# Patient Record
Sex: MALE | NOT HISPANIC OR LATINO | Employment: FULL TIME | URBAN - METROPOLITAN AREA
[De-identification: names, ages, dates, MRNs, and addresses within clinical notes are randomized per-mention and may not be internally consistent; named-entity substitution may affect disease eponyms.]

---

## 2020-08-07 ENCOUNTER — APPOINTMENT (OUTPATIENT)
Dept: RADIOLOGY | Facility: MEDICAL CENTER | Age: 25
End: 2020-08-07
Attending: EMERGENCY MEDICINE
Payer: COMMERCIAL

## 2020-08-07 ENCOUNTER — HOSPITAL ENCOUNTER (EMERGENCY)
Facility: MEDICAL CENTER | Age: 25
End: 2020-08-07
Attending: EMERGENCY MEDICINE
Payer: COMMERCIAL

## 2020-08-07 VITALS
HEART RATE: 67 BPM | WEIGHT: 148.37 LBS | RESPIRATION RATE: 18 BRPM | OXYGEN SATURATION: 98 % | HEIGHT: 66 IN | BODY MASS INDEX: 23.84 KG/M2 | SYSTOLIC BLOOD PRESSURE: 121 MMHG | DIASTOLIC BLOOD PRESSURE: 59 MMHG | TEMPERATURE: 98.2 F

## 2020-08-07 DIAGNOSIS — J02.9 PHARYNGITIS, UNSPECIFIED ETIOLOGY: ICD-10-CM

## 2020-08-07 DIAGNOSIS — Z20.822 SUSPECTED COVID-19 VIRUS INFECTION: ICD-10-CM

## 2020-08-07 LAB
COVID ORDER STATUS COVID19: NORMAL
EKG IMPRESSION: NORMAL
S PYO DNA SPEC NAA+PROBE: NOT DETECTED
SARS-COV-2 RNA RESP QL NAA+PROBE: NOTDETECTED
SPECIMEN SOURCE: NORMAL

## 2020-08-07 PROCEDURE — U0003 INFECTIOUS AGENT DETECTION BY NUCLEIC ACID (DNA OR RNA); SEVERE ACUTE RESPIRATORY SYNDROME CORONAVIRUS 2 (SARS-COV-2) (CORONAVIRUS DISEASE [COVID-19]), AMPLIFIED PROBE TECHNIQUE, MAKING USE OF HIGH THROUGHPUT TECHNOLOGIES AS DESCRIBED BY CMS-2020-01-R: HCPCS

## 2020-08-07 PROCEDURE — 93005 ELECTROCARDIOGRAM TRACING: CPT

## 2020-08-07 PROCEDURE — 93005 ELECTROCARDIOGRAM TRACING: CPT | Performed by: EMERGENCY MEDICINE

## 2020-08-07 PROCEDURE — C9803 HOPD COVID-19 SPEC COLLECT: HCPCS | Performed by: EMERGENCY MEDICINE

## 2020-08-07 PROCEDURE — 87651 STREP A DNA AMP PROBE: CPT

## 2020-08-07 PROCEDURE — 99283 EMERGENCY DEPT VISIT LOW MDM: CPT

## 2020-08-07 PROCEDURE — 71045 X-RAY EXAM CHEST 1 VIEW: CPT

## 2020-08-07 RX ORDER — PALIPERIDONE 6 MG/1
6 TABLET, EXTENDED RELEASE ORAL EVERY MORNING
COMMUNITY

## 2020-08-07 NOTE — ED NOTES
Discharge orders received from ERP. Provided education on discharge instructions and diagnosis. Pt demonstrated understanding of education. Pt verbalized understanding of need for follow up appointment.  Pt ambulatory off unit with all personal belongings.

## 2020-08-07 NOTE — DISCHARGE INSTRUCTIONS
You likely have a viral illness and may have COVID-19.     If you develop significant shortness of breath, meaning that it is difficult for you to walk even short distances without having to stop and catch your breath, or you become severely dizzy and this is persistent then please return to the emergency department.

## 2020-08-07 NOTE — ED TRIAGE NOTES
"Enrique Blackmon   24 y.o. male   Chief Complaint   Patient presents with   • Shortness of Breath     x 1 day, worse when lying on his back. pt also with chest wall pain worsening wiht inspiration    • Sore Throat        Symptoms got worse while he was working as a .   Pt amb to triage with steady gait for above complaint.  Pt is alert and oriented, speaking in full sentences, follows commands and responds appropriately to questions. NAD. Resp are even and unlabored.   Pt placed in lobby. Pt educated on triage process. Pt encouraged to alert staff for any changes.    /76   Pulse 90   Temp 36.7 °C (98.1 °F) (Temporal)   Resp 17   Ht 1.676 m (5' 6\")   Wt 67.3 kg (148 lb 5.9 oz)   SpO2 98%   BMI 23.95 kg/m²     "

## 2020-08-07 NOTE — LETTER
8/8/2020               Enrique Blackmon  [address]  [place] [address]        Dear Enrique (MR#[id number]),    This letter is to inform you that your COVID-19 test result is NEGATIVE.  This means that the virus that causes COVID-19 was not found in your sample.      A review of your test during your recent visit requires that we notify you of the following:    Your nasal swab was negative for the novel coronavirus (COVID-19).     You are encouraged to stay at home until you have had no fever for 72 hours without the use of fever reducing medications and your symptoms are improving. There is no need for further self-quarantine for 14 days for COVID-19 unless otherwise directed by the Health Department.     For any further questions regarding COVID-19, please contact the [facility] at [phone].  Thank you for your cooperation in the matter.      Sincerely,      [facility]

## 2020-08-07 NOTE — ED PROVIDER NOTES
"ER Provider Note     Scribed for Watson Arias M.D. by Omar Morocho. 8/7/2020, 2:26 AM.    Primary Care Provider: No primary care provider noted.  Means of Arrival: Walk-in   History obtained from: Patient  History limited by: None     CHIEF COMPLAINT  Chief Complaint   Patient presents with   • Shortness of Breath     x 1 day, worse when lying on his back. pt also with chest wall pain worsening wiht inspiration    • Sore Throat       HPI  Enrique Blackmon is a 24 y.o. male who presents to the Emergency Department for acute, intermittent chest pain and sore throat onset 1 day ago. Patient states that he recently came back from [place] and afterwards noticed some pain to his chest and sore throat. He notes that he feels pain to the center of his chest with breathing, but denies any difficulty breathing. There are no known alleviating or exacerbating factors. denies any associated fever or cough.     REVIEW OF SYSTEMS  See HPI for further details. All other systems are negative.     PAST MEDICAL HISTORY   has a past medical history of bipolar.    SURGICAL HISTORY  patient denies any surgical history    SOCIAL HISTORY  Social History     Tobacco Use   • Smoking status: Never Smoker   • Smokeless tobacco: Never Used   Substance Use Topics   • Alcohol use: Not Currently   • Drug use: Never      Social History     Substance and Sexual Activity   Drug Use Never       FAMILY HISTORY  History reviewed. No pertinent family history.    CURRENT MEDICATIONS  Home Medications     Reviewed by Hawa Shahid R.N. (Registered Nurse) on 08/07/20 at 0159  Med List Status: Complete   Medication Last Dose Status   paliperidone (INVEGA) 6 MG ER tablet 7/31/2020 Active                ALLERGIES  No Known Allergies    PHYSICAL EXAM  VITAL SIGNS: /76   Pulse 90   Temp 36.7 °C (98.1 °F) (Temporal)   Resp 17   Ht 1.676 m (5' 6\")   Wt 67.3 kg (148 lb 5.9 oz)   SpO2 98%   BMI 23.95 kg/m²    Constitutional: Alert in no " apparent distress.  HENT: Redness noted to the posterior oropharynx. No signs of trauma, Bilateral external ears normal, Nose normal.   Eyes: Pupils are equal and reactive, Conjunctiva normal, Non-icteric.   Neck: Normal range of motion, No tenderness, Supple, No stridor.   Lymphatic: No lymphadenopathy noted.   Cardiovascular: Regular rate and rhythm, no palpable thrill  Thorax & Lungs: No respiratory distress,  No chest tenderness.   Abdomen: Bowel sounds normal, Soft, No tenderness, No masses, No pulsatile masses. No peritoneal signs.  Skin: Warm, Dry, No erythema, No rash.   Back: No bony tenderness, No CVA tenderness.   Extremities: Intact distal pulses, No edema, No tenderness, No cyanosis.  Musculoskeletal: Good range of motion in all major joints. No tenderness to palpation or major deformities noted.   Neurologic: Alert , Normal motor function, Normal sensory function, No focal deficits noted.   Psychiatric: Affect normal, Judgment normal, Mood normal.     DIAGNOSTIC STUDIES / PROCEDURES    EKG Interpretation:  Interpreted by me    12 Lead EKG interpreted by me to show:  Normal sinus rhythm    Axis: Normal  Intervals: Normal  Normal T waves  Normal ST segments  My impression of this EKG: Does not indicate ischemia or arrhythmia at this time.     LABS  Labs Reviewed   GROUP A STREP BY PCR    Narrative:     Droplet, Contact, and Eye Protection   COVID/SARS COV-2   SARS-COV-2, PCR (IN-HOUSE)     All labs reviewed by me.    RADIOLOGY  DX-CHEST-PORTABLE (1 VIEW)   Final Result         1.  No acute cardiopulmonary disease.         The radiologist's interpretation of all radiological studies have been reviewed by me.    COURSE & MEDICAL DECISION MAKING  Pertinent Labs & Imaging studies reviewed. (See chart for details)    PPE Note: I verified that the patient was wearing a mask and I was wearing appropriate PPE every time I entered the room. The patient's mask was on the patient at all times during my encounter  except for a brief view of the oropharynx.     Scribe remained outside the patient's room and did not have any contact with the patient for the duration of patient encounter.     This is a 24 y.o. male that presents chest pain as well as mild shortness of breath and cough.  There is some viral symptoms that are concerning for COVID.  He recently traveled up to [facility].  I will get a chest x-ray to evaluate for pneumonia pneumothorax.  EKG as well as strep swab given the sore throat and then reassess..     2:26 AM - Patient seen and examined at bedside. Ordered DX-chest, Group A strep by PCR, COVID, and EKG.      Patient had a nonischemic EKG.  His strep is negative.  The COVID PCR was sent off.  I will instruct him to isolate at home.  His chest x-ray is negative.  We will discharge him home with strict return precautions and follow-up.            FINAL IMPRESSION  1. Suspected COVID-19 virus infection    2. Pharyngitis, unspecified etiology          Omar VILLA (Estrada), am scribing for, and in the presence of, Watson Arias M.D..    Electronically signed by: Omar Morocho (Estrada), 8/7/2020    IWatson M.D. personally performed the services described in this documentation, as scribed by Omar Morocho in my presence, and it is both accurate and complete. C.    The note accurately reflects work and decisions made by me.  Watson Arias M.D.  8/7/2020  6:43 AM

## 2020-08-08 NOTE — ED NOTES
COVID-19 Test Follow Up  08/08/20      Patient tested negative for COVID-19. Attempted to inform patient of result, but there was no answer. Left a message to call for results. If they return the call, I will discuss staying at home until no fever for 72 hours without the use of fever reducing medications and symptoms improving. Informed there is no need for further self-isolatation for 14 days for COVID-19 unless otherwise directed by the Health Dept.     They are advised to return to the ER for worsening symptoms including difficulty breathing, ongoing fever, weakness or chest pain.    Corazon Soto, PharmD

## 2020-11-30 ENCOUNTER — APPOINTMENT (OUTPATIENT)
Dept: RADIOLOGY | Facility: IMAGING CENTER | Age: 25
End: 2020-11-30
Attending: FAMILY MEDICINE
Payer: COMMERCIAL

## 2020-11-30 ENCOUNTER — OFFICE VISIT (OUTPATIENT)
Dept: URGENT CARE | Facility: CLINIC | Age: 25
End: 2020-11-30
Payer: COMMERCIAL

## 2020-11-30 VITALS
BODY MASS INDEX: 22.18 KG/M2 | TEMPERATURE: 97.1 F | OXYGEN SATURATION: 99 % | HEIGHT: 66 IN | HEART RATE: 50 BPM | SYSTOLIC BLOOD PRESSURE: 112 MMHG | DIASTOLIC BLOOD PRESSURE: 62 MMHG | RESPIRATION RATE: 16 BRPM | WEIGHT: 138 LBS

## 2020-11-30 DIAGNOSIS — S20.219A CONTUSION OF CHEST WALL, UNSPECIFIED LATERALITY, INITIAL ENCOUNTER: ICD-10-CM

## 2020-11-30 DIAGNOSIS — R07.9 CHEST PAIN, UNSPECIFIED TYPE: ICD-10-CM

## 2020-11-30 PROCEDURE — 99204 OFFICE O/P NEW MOD 45 MIN: CPT | Performed by: FAMILY MEDICINE

## 2020-11-30 PROCEDURE — 71111 X-RAY EXAM RIBS/CHEST4/> VWS: CPT | Mod: TC | Performed by: FAMILY MEDICINE

## 2020-11-30 NOTE — LETTER
November 30, 2020         Patient: Enrique Blackmon   YOB: 1995   Date of Visit: 11/30/2020           To Whom it May Concern:    Enrique Blackmon was seen in my clinic on 11/30/2020.       I recommend no lifting > 10 pounds for the next week.      If you have any questions or concerns, please don't hesitate to call.        Sincerely,           Dandy Vazquez M.D.  Electronically Signed

## 2020-12-01 NOTE — PROGRESS NOTES
"Subjective:      Chief Complaint   Patient presents with   • Injury     skiing injury x 1 day hurt his chest and having (R) lung pain                      Chest Pain   This is a new problem.   Pt fell while snowboarding yesterday.   Now c/o dull, constant chest pressure and soreness over upper sternum area and rt pectoral muscle.   Pain is worse with deep breathing.          Pertinent negatives include no sob, claudication, cough, exertional chest pressure, fever, nausea, near-syncope, numbness, syncope or vomiting. He has not taken any medication for this.           Past Medical History:   Diagnosis Date   • bipolar          Social History     Tobacco Use   • Smoking status: Never Smoker   • Smokeless tobacco: Never Used   Substance Use Topics   • Alcohol use: Not Currently   • Drug use: Never         Current Outpatient Medications on File Prior to Visit   Medication Sig Dispense Refill   • paliperidone (INVEGA) 6 MG ER tablet Take 6 mg by mouth every morning.       No current facility-administered medications on file prior to visit.      Family history was reviewed and not pertinent         Review of Systems   Constitutional: Negative for fever, chills and malaise/fatigue.   Eyes: Negative for vision changes, d/c.    Respiratory: Negative for cough and sputum production.    Cardiovascular: Negative for  palpitations.   Gastrointestinal: Negative for nausea, vomiting, abdominal pain, diarrhea and constipation.   Genitourinary: Negative for dysuria, urgency and frequency.   Skin: Negative for rash or  itching.   Neurological: Negative for dizziness and tingling.   Psychiatric/Behavioral: Negative for depression.   Hematologic/lymphatic - denies bruising or excessive bleeding  All other systems reviewed and are negative.         Objective:     /62 (BP Location: Left arm, Patient Position: Sitting, BP Cuff Size: Adult)   Pulse (!) 50   Temp 36.2 °C (97.1 °F) (Temporal)   Resp 16   Ht 1.676 m (5' 6\")   Wt " 62.6 kg (138 lb)   SpO2 99%       Physical Exam   Constitutional: pt is oriented to person, place, and time. Pt appears well-developed and well-nourished.   HENT:   Head: Normocephalic and atraumatic.   Mouth/Throat: Oropharynx is clear and moist.   Eyes: Conjunctivae and EOM are normal. Pupils are equal, round, and reactive to light. No scleral icterus.   Neck: Normal range of motion. Neck supple. No JVD present. No thyromegaly present.   Cardiovascular: Normal rate, regular rhythm, normal heart sounds and intact distal pulses.  Exam reveals no gallop and no friction rub.    No murmur heard.  Pulmonary/Chest: Effort normal and breath sounds normal. No respiratory distress. Pt has no wheezes. Pt has no rales. Pt exhibits  Tenderness over rt ribs,  rt pectoralis muscle and superior aspect of sternum.   No obvious bruising or swelling. .   Abdominal: Soft.   Musculoskeletal: Normal range of motion. Pt exhibits no edema.   Lymphadenopathy:     Pt has no cervical adenopathy.   Neurological: pt is alert and oriented to person, place, and time. No cranial nerve deficit.   Skin: Skin is warm and dry. No erythema.   Psychiatric: pt has a normal mood and affect. patient's behavior is normal.     Details    Reading Physician Reading Date Result Priority   Mercedes Boateng M.D.  [phone] 11/30/2020 Urgent Care      Narrative & Impression        11/30/2020 10:40 PM     HISTORY/REASON FOR EXAM:  Pain Following Trauma.     TECHNIQUE/EXAM DESCRIPTION AND NUMBER OF VIEWS:  9 images of the bilateral ribs and chest.     COMPARISON: NONE     FINDINGS:     No fractures or acute bony changes are noted.  There is no evidence of a hemo or pneumothorax.     IMPRESSION:        1.  Normal rib series.          Assessment/Plan:        1. Chest wall contusion  Chest x-ray was personally interpreted and reviewed. No acute cardiopulmonary findings. No pulmonary infiltrates or densities. Cardiac silhouette is normal. No hemidiaphragm  elevation. No bony abnormalities.       - Diclofenac Sodium (VOLTAREN) 1 % Gel; Place 4 g on the skin 3 times a day as needed.  Dispense: 100 g; Refill: 0    - [id number]-[id number] (WITH 1-VIEW CXR); Future

## 2021-03-28 ENCOUNTER — APPOINTMENT (OUTPATIENT)
Dept: RADIOLOGY | Facility: MEDICAL CENTER | Age: 26
DRG: 028 | End: 2021-03-28
Attending: SURGERY
Payer: COMMERCIAL

## 2021-03-28 ENCOUNTER — APPOINTMENT (OUTPATIENT)
Dept: RADIOLOGY | Facility: MEDICAL CENTER | Age: 26
DRG: 028 | End: 2021-03-28
Attending: EMERGENCY MEDICINE
Payer: COMMERCIAL

## 2021-03-28 ENCOUNTER — HOSPITAL ENCOUNTER (INPATIENT)
Facility: MEDICAL CENTER | Age: 26
LOS: 17 days | DRG: 028 | End: 2021-04-14
Attending: EMERGENCY MEDICINE | Admitting: SURGERY
Payer: COMMERCIAL

## 2021-03-28 DIAGNOSIS — S14.109A SPINAL CORD INJURY, C1-C7, INITIAL ENCOUNTER (HCC): ICD-10-CM

## 2021-03-28 PROBLEM — S12.9XXA: Status: ACTIVE | Noted: 2021-03-28

## 2021-03-28 PROBLEM — Z53.09 CONTRAINDICATION TO DEEP VEIN THROMBOSIS (DVT) PROPHYLAXIS: Status: ACTIVE | Noted: 2021-03-28

## 2021-03-28 PROBLEM — S12.500A C6 CERVICAL FRACTURE (HCC): Status: ACTIVE | Noted: 2021-03-28

## 2021-03-28 PROBLEM — T14.90XA TRAUMA: Status: ACTIVE | Noted: 2021-03-28

## 2021-03-28 PROBLEM — T68.XXXA HYPOTHERMIA: Status: ACTIVE | Noted: 2021-03-28

## 2021-03-28 PROBLEM — S01.01XA SCALP LACERATION, INITIAL ENCOUNTER: Status: ACTIVE | Noted: 2021-03-28

## 2021-03-28 PROBLEM — Z11.9 SCREENING EXAMINATION FOR INFECTIOUS DISEASE: Status: ACTIVE | Noted: 2021-03-28

## 2021-03-28 LAB
ABO + RH BLD: NORMAL
ABO GROUP BLD: NORMAL
ALBUMIN SERPL BCP-MCNC: 4.5 G/DL (ref 3.2–4.9)
ALBUMIN/GLOB SERPL: 1.8 G/DL
ALP SERPL-CCNC: 84 U/L (ref 30–99)
ALT SERPL-CCNC: 25 U/L (ref 2–50)
ANION GAP SERPL CALC-SCNC: 16 MMOL/L (ref 7–16)
APTT PPP: 24.8 SEC (ref 24.7–36)
AST SERPL-CCNC: 29 U/L (ref 12–45)
BILIRUB SERPL-MCNC: 0.7 MG/DL (ref 0.1–1.5)
BLD GP AB SCN SERPL QL: NORMAL
BUN SERPL-MCNC: 17 MG/DL (ref 8–22)
CALCIUM SERPL-MCNC: 9.4 MG/DL (ref 8.5–10.5)
CFT BLD TEG: 3.8 MIN (ref 5–10)
CHLORIDE SERPL-SCNC: 101 MMOL/L (ref 96–112)
CLOT ANGLE BLD TEG: 62.6 DEGREES (ref 53–72)
CLOT LYSIS 30M P MA LENFR BLD TEG: 0.2 % (ref 0–8)
CO2 SERPL-SCNC: 20 MMOL/L (ref 20–33)
CREAT SERPL-MCNC: 0.77 MG/DL (ref 0.5–1.4)
CT.EXTRINSIC BLD ROTEM: 2.2 MIN (ref 1–3)
ERYTHROCYTE [DISTWIDTH] IN BLOOD BY AUTOMATED COUNT: 42 FL (ref 35.9–50)
ETHANOL BLD-MCNC: 53.7 MG/DL (ref 0–10)
GLOBULIN SER CALC-MCNC: 2.5 G/DL (ref 1.9–3.5)
GLUCOSE BLD-MCNC: 110 MG/DL (ref 65–99)
GLUCOSE SERPL-MCNC: 94 MG/DL (ref 65–99)
HCT VFR BLD AUTO: 38.2 % (ref 42–52)
HGB BLD-MCNC: 13.3 G/DL (ref 14–18)
INR PPP: 1.06 (ref 0.87–1.13)
LACTATE BLD-SCNC: 2.3 MMOL/L (ref 0.5–2)
LACTATE BLD-SCNC: 2.7 MMOL/L (ref 0.5–2)
MCF BLD TEG: 60 MM (ref 50–70)
MCH RBC QN AUTO: 30.7 PG (ref 27–33)
MCHC RBC AUTO-ENTMCNC: 34.8 G/DL (ref 33.7–35.3)
MCV RBC AUTO: 88.2 FL (ref 81.4–97.8)
PA AA BLD-ACNC: 0 %
PA ADP BLD-ACNC: 0 %
PLATELET # BLD AUTO: 248 K/UL (ref 164–446)
PMV BLD AUTO: 9.5 FL (ref 9–12.9)
POTASSIUM SERPL-SCNC: 3.3 MMOL/L (ref 3.6–5.5)
PROT SERPL-MCNC: 7 G/DL (ref 6–8.2)
PROTHROMBIN TIME: 14.2 SEC (ref 12–14.6)
RBC # BLD AUTO: 4.33 M/UL (ref 4.7–6.1)
RH BLD: NORMAL
SARS-COV+SARS-COV-2 AG RESP QL IA.RAPID: NOTDETECTED
SODIUM SERPL-SCNC: 137 MMOL/L (ref 135–145)
SPECIMEN SOURCE: NORMAL
TEG ALGORITHM TGALG: ABNORMAL
WBC # BLD AUTO: 7.9 K/UL (ref 4.8–10.8)

## 2021-03-28 PROCEDURE — 36415 COLL VENOUS BLD VENIPUNCTURE: CPT

## 2021-03-28 PROCEDURE — 85347 COAGULATION TIME ACTIVATED: CPT

## 2021-03-28 PROCEDURE — 85576 BLOOD PLATELET AGGREGATION: CPT

## 2021-03-28 PROCEDURE — 71260 CT THORAX DX C+: CPT

## 2021-03-28 PROCEDURE — 85384 FIBRINOGEN ACTIVITY: CPT

## 2021-03-28 PROCEDURE — 85610 PROTHROMBIN TIME: CPT

## 2021-03-28 PROCEDURE — 72128 CT CHEST SPINE W/O DYE: CPT

## 2021-03-28 PROCEDURE — 83605 ASSAY OF LACTIC ACID: CPT

## 2021-03-28 PROCEDURE — 700111 HCHG RX REV CODE 636 W/ 250 OVERRIDE (IP): Performed by: SURGERY

## 2021-03-28 PROCEDURE — 82077 ASSAY SPEC XCP UR&BREATH IA: CPT

## 2021-03-28 PROCEDURE — 80053 COMPREHEN METABOLIC PANEL: CPT

## 2021-03-28 PROCEDURE — 770022 HCHG ROOM/CARE - ICU (200)

## 2021-03-28 PROCEDURE — C9803 HOPD COVID-19 SPEC COLLECT: HCPCS | Performed by: EMERGENCY MEDICINE

## 2021-03-28 PROCEDURE — 86901 BLOOD TYPING SEROLOGIC RH(D): CPT

## 2021-03-28 PROCEDURE — 83735 ASSAY OF MAGNESIUM: CPT

## 2021-03-28 PROCEDURE — 84100 ASSAY OF PHOSPHORUS: CPT

## 2021-03-28 PROCEDURE — 86850 RBC ANTIBODY SCREEN: CPT

## 2021-03-28 PROCEDURE — U0003 INFECTIOUS AGENT DETECTION BY NUCLEIC ACID (DNA OR RNA); SEVERE ACUTE RESPIRATORY SYNDROME CORONAVIRUS 2 (SARS-COV-2) (CORONAVIRUS DISEASE [COVID-19]), AMPLIFIED PROBE TECHNIQUE, MAKING USE OF HIGH THROUGHPUT TECHNOLOGIES AS DESCRIBED BY CMS-2020-01-R: HCPCS

## 2021-03-28 PROCEDURE — 700101 HCHG RX REV CODE 250: Performed by: SURGERY

## 2021-03-28 PROCEDURE — 70450 CT HEAD/BRAIN W/O DYE: CPT

## 2021-03-28 PROCEDURE — 85730 THROMBOPLASTIN TIME PARTIAL: CPT

## 2021-03-28 PROCEDURE — 82962 GLUCOSE BLOOD TEST: CPT

## 2021-03-28 PROCEDURE — U0005 INFEC AGEN DETEC AMPLI PROBE: HCPCS

## 2021-03-28 PROCEDURE — 99291 CRITICAL CARE FIRST HOUR: CPT

## 2021-03-28 PROCEDURE — 70498 CT ANGIOGRAPHY NECK: CPT

## 2021-03-28 PROCEDURE — 86900 BLOOD TYPING SEROLOGIC ABO: CPT

## 2021-03-28 PROCEDURE — 72131 CT LUMBAR SPINE W/O DYE: CPT

## 2021-03-28 PROCEDURE — 700105 HCHG RX REV CODE 258: Performed by: SURGERY

## 2021-03-28 PROCEDURE — 85027 COMPLETE CBC AUTOMATED: CPT

## 2021-03-28 PROCEDURE — 87426 SARSCOV CORONAVIRUS AG IA: CPT

## 2021-03-28 PROCEDURE — 700117 HCHG RX CONTRAST REV CODE 255: Performed by: EMERGENCY MEDICINE

## 2021-03-28 PROCEDURE — G0390 TRAUMA RESPONS W/HOSP CRITI: HCPCS

## 2021-03-28 PROCEDURE — 71045 X-RAY EXAM CHEST 1 VIEW: CPT

## 2021-03-28 RX ORDER — FAMOTIDINE 20 MG/1
20 TABLET, FILM COATED ORAL 2 TIMES DAILY
Status: DISCONTINUED | OUTPATIENT
Start: 2021-03-28 | End: 2021-04-01

## 2021-03-28 RX ORDER — AMOXICILLIN 250 MG
1 CAPSULE ORAL
Status: DISCONTINUED | OUTPATIENT
Start: 2021-03-28 | End: 2021-03-29

## 2021-03-28 RX ORDER — ONDANSETRON 2 MG/ML
4 INJECTION INTRAMUSCULAR; INTRAVENOUS EVERY 4 HOURS PRN
Status: DISCONTINUED | OUTPATIENT
Start: 2021-03-28 | End: 2021-03-29

## 2021-03-28 RX ORDER — DOCUSATE SODIUM 100 MG/1
100 CAPSULE, LIQUID FILLED ORAL 2 TIMES DAILY
Status: DISCONTINUED | OUTPATIENT
Start: 2021-03-28 | End: 2021-03-29

## 2021-03-28 RX ORDER — HYDROMORPHONE HYDROCHLORIDE 1 MG/ML
0.5 INJECTION, SOLUTION INTRAMUSCULAR; INTRAVENOUS; SUBCUTANEOUS
Status: DISCONTINUED | OUTPATIENT
Start: 2021-03-28 | End: 2021-03-29

## 2021-03-28 RX ORDER — OXYCODONE HYDROCHLORIDE 5 MG/1
10 TABLET ORAL
Status: DISCONTINUED | OUTPATIENT
Start: 2021-03-28 | End: 2021-03-28

## 2021-03-28 RX ORDER — NOREPINEPHRINE BITARTRATE 0.03 MG/ML
0-30 INJECTION, SOLUTION INTRAVENOUS CONTINUOUS
Status: DISCONTINUED | OUTPATIENT
Start: 2021-03-28 | End: 2021-04-05

## 2021-03-28 RX ORDER — SODIUM CHLORIDE 9 MG/ML
INJECTION, SOLUTION INTRAVENOUS CONTINUOUS
Status: DISCONTINUED | OUTPATIENT
Start: 2021-03-28 | End: 2021-03-29

## 2021-03-28 RX ORDER — DEXTROSE MONOHYDRATE 25 G/50ML
50 INJECTION, SOLUTION INTRAVENOUS
Status: DISCONTINUED | OUTPATIENT
Start: 2021-03-28 | End: 2021-03-30

## 2021-03-28 RX ORDER — POLYETHYLENE GLYCOL 3350 17 G/17G
1 POWDER, FOR SOLUTION ORAL 2 TIMES DAILY
Status: DISCONTINUED | OUTPATIENT
Start: 2021-03-28 | End: 2021-04-14 | Stop reason: HOSPADM

## 2021-03-28 RX ORDER — ENEMA 19; 7 G/133ML; G/133ML
1 ENEMA RECTAL
Status: DISCONTINUED | OUTPATIENT
Start: 2021-03-28 | End: 2021-03-29

## 2021-03-28 RX ORDER — AMOXICILLIN 250 MG
1 CAPSULE ORAL NIGHTLY
Status: DISCONTINUED | OUTPATIENT
Start: 2021-03-28 | End: 2021-03-29

## 2021-03-28 RX ORDER — BISACODYL 10 MG
10 SUPPOSITORY, RECTAL RECTAL
Status: DISCONTINUED | OUTPATIENT
Start: 2021-03-28 | End: 2021-03-29

## 2021-03-28 RX ORDER — OXYCODONE HYDROCHLORIDE 5 MG/1
5 TABLET ORAL
Status: DISCONTINUED | OUTPATIENT
Start: 2021-03-28 | End: 2021-03-28

## 2021-03-28 RX ADMIN — FAMOTIDINE 20 MG: 10 INJECTION INTRAVENOUS at 22:54

## 2021-03-28 RX ADMIN — SODIUM CHLORIDE: 9 INJECTION, SOLUTION INTRAVENOUS at 22:47

## 2021-03-28 RX ADMIN — NOREPINEPHRINE BITARTRATE 5 MCG/MIN: 1 INJECTION INTRAVENOUS at 22:47

## 2021-03-28 RX ADMIN — IOHEXOL 100 ML: 350 INJECTION, SOLUTION INTRAVENOUS at 22:46

## 2021-03-28 ASSESSMENT — FIBROSIS 4 INDEX: FIB4 SCORE: 0.54

## 2021-03-29 ENCOUNTER — APPOINTMENT (OUTPATIENT)
Dept: RADIOLOGY | Facility: MEDICAL CENTER | Age: 26
DRG: 028 | End: 2021-03-29
Attending: SURGERY
Payer: COMMERCIAL

## 2021-03-29 ENCOUNTER — ANESTHESIA EVENT (OUTPATIENT)
Dept: SURGERY | Facility: MEDICAL CENTER | Age: 26
DRG: 028 | End: 2021-03-29
Payer: COMMERCIAL

## 2021-03-29 ENCOUNTER — APPOINTMENT (OUTPATIENT)
Dept: RADIOLOGY | Facility: MEDICAL CENTER | Age: 26
DRG: 028 | End: 2021-03-29
Attending: NURSE PRACTITIONER
Payer: COMMERCIAL

## 2021-03-29 ENCOUNTER — APPOINTMENT (OUTPATIENT)
Dept: RADIOLOGY | Facility: MEDICAL CENTER | Age: 26
DRG: 028 | End: 2021-03-29
Attending: NEUROLOGICAL SURGERY
Payer: COMMERCIAL

## 2021-03-29 ENCOUNTER — ANESTHESIA (OUTPATIENT)
Dept: SURGERY | Facility: MEDICAL CENTER | Age: 26
DRG: 028 | End: 2021-03-29
Payer: COMMERCIAL

## 2021-03-29 PROBLEM — F31.9 BIPOLAR AFFECTIVE DISORDER (HCC): Status: ACTIVE | Noted: 2021-03-29

## 2021-03-29 LAB
ALBUMIN SERPL BCP-MCNC: 4.2 G/DL (ref 3.2–4.9)
ALBUMIN/GLOB SERPL: 1.6 G/DL
ALP SERPL-CCNC: 86 U/L (ref 30–99)
ALT SERPL-CCNC: 22 U/L (ref 2–50)
ANION GAP SERPL CALC-SCNC: 13 MMOL/L (ref 7–16)
AST SERPL-CCNC: 30 U/L (ref 12–45)
BASOPHILS # BLD AUTO: 0.3 % (ref 0–1.8)
BASOPHILS # BLD: 0.06 K/UL (ref 0–0.12)
BILIRUB SERPL-MCNC: 1.3 MG/DL (ref 0.1–1.5)
BUN SERPL-MCNC: 15 MG/DL (ref 8–22)
CALCIUM SERPL-MCNC: 9 MG/DL (ref 8.5–10.5)
CHLORIDE SERPL-SCNC: 104 MMOL/L (ref 96–112)
CO2 SERPL-SCNC: 20 MMOL/L (ref 20–33)
CREAT SERPL-MCNC: 0.66 MG/DL (ref 0.5–1.4)
EOSINOPHIL # BLD AUTO: 0 K/UL (ref 0–0.51)
EOSINOPHIL NFR BLD: 0 % (ref 0–6.9)
ERYTHROCYTE [DISTWIDTH] IN BLOOD BY AUTOMATED COUNT: 41.4 FL (ref 35.9–50)
GLOBULIN SER CALC-MCNC: 2.6 G/DL (ref 1.9–3.5)
GLUCOSE BLD-MCNC: 109 MG/DL (ref 65–99)
GLUCOSE BLD-MCNC: 119 MG/DL (ref 65–99)
GLUCOSE BLD-MCNC: 99 MG/DL (ref 65–99)
GLUCOSE SERPL-MCNC: 107 MG/DL (ref 65–99)
HCT VFR BLD AUTO: 39.8 % (ref 42–52)
HGB BLD-MCNC: 13.4 G/DL (ref 14–18)
HGB BLD-MCNC: 13.8 G/DL (ref 14–18)
IMM GRANULOCYTES # BLD AUTO: 0.14 K/UL (ref 0–0.11)
IMM GRANULOCYTES NFR BLD AUTO: 0.6 % (ref 0–0.9)
LYMPHOCYTES # BLD AUTO: 1.33 K/UL (ref 1–4.8)
LYMPHOCYTES NFR BLD: 5.6 % (ref 22–41)
MAGNESIUM SERPL-MCNC: 1.7 MG/DL (ref 1.5–2.5)
MAGNESIUM SERPL-MCNC: 1.7 MG/DL (ref 1.5–2.5)
MCH RBC QN AUTO: 30.3 PG (ref 27–33)
MCHC RBC AUTO-ENTMCNC: 34.7 G/DL (ref 33.7–35.3)
MCV RBC AUTO: 87.5 FL (ref 81.4–97.8)
MONOCYTES # BLD AUTO: 1.52 K/UL (ref 0–0.85)
MONOCYTES NFR BLD AUTO: 6.3 % (ref 0–13.4)
NEUTROPHILS # BLD AUTO: 20.91 K/UL (ref 1.82–7.42)
NEUTROPHILS NFR BLD: 87.2 % (ref 44–72)
NRBC # BLD AUTO: 0 K/UL
NRBC BLD-RTO: 0 /100 WBC
PHOSPHATE SERPL-MCNC: 3.2 MG/DL (ref 2.5–4.5)
PHOSPHATE SERPL-MCNC: 3.7 MG/DL (ref 2.5–4.5)
PLATELET # BLD AUTO: 323 K/UL (ref 164–446)
PMV BLD AUTO: 9.9 FL (ref 9–12.9)
POTASSIUM SERPL-SCNC: 3.9 MMOL/L (ref 3.6–5.5)
PROT SERPL-MCNC: 6.8 G/DL (ref 6–8.2)
RBC # BLD AUTO: 4.55 M/UL (ref 4.7–6.1)
SARS-COV-2 RNA RESP QL NAA+PROBE: NOTDETECTED
SODIUM SERPL-SCNC: 137 MMOL/L (ref 135–145)
SPECIMEN SOURCE: NORMAL
WBC # BLD AUTO: 24 K/UL (ref 4.8–10.8)

## 2021-03-29 PROCEDURE — A9270 NON-COVERED ITEM OR SERVICE: HCPCS | Performed by: PHYSICIAN ASSISTANT

## 2021-03-29 PROCEDURE — 501838 HCHG SUTURE GENERAL: Performed by: NEUROLOGICAL SURGERY

## 2021-03-29 PROCEDURE — 110454 HCHG SHELL REV 250: Performed by: NEUROLOGICAL SURGERY

## 2021-03-29 PROCEDURE — C1713 ANCHOR/SCREW BN/BN,TIS/BN: HCPCS | Performed by: NEUROLOGICAL SURGERY

## 2021-03-29 PROCEDURE — 99291 CRITICAL CARE FIRST HOUR: CPT | Performed by: SURGERY

## 2021-03-29 PROCEDURE — 93970 EXTREMITY STUDY: CPT | Mod: 26 | Performed by: INTERNAL MEDICINE

## 2021-03-29 PROCEDURE — 700102 HCHG RX REV CODE 250 W/ 637 OVERRIDE(OP): Performed by: SURGERY

## 2021-03-29 PROCEDURE — 501411 HCHG SPONGE, BABY LAP W/O RINGS: Performed by: NEUROLOGICAL SURGERY

## 2021-03-29 PROCEDURE — 700101 HCHG RX REV CODE 250: Performed by: SURGERY

## 2021-03-29 PROCEDURE — 80053 COMPREHEN METABOLIC PANEL: CPT

## 2021-03-29 PROCEDURE — 160041 HCHG SURGERY MINUTES - EA ADDL 1 MIN LEVEL 4: Performed by: NEUROLOGICAL SURGERY

## 2021-03-29 PROCEDURE — 160035 HCHG PACU - 1ST 60 MINS PHASE I: Performed by: NEUROLOGICAL SURGERY

## 2021-03-29 PROCEDURE — 03HY32Z INSERTION OF MONITORING DEVICE INTO UPPER ARTERY, PERCUTANEOUS APPROACH: ICD-10-PCS | Performed by: ANESTHESIOLOGY

## 2021-03-29 PROCEDURE — 160029 HCHG SURGERY MINUTES - 1ST 30 MINS LEVEL 4: Performed by: NEUROLOGICAL SURGERY

## 2021-03-29 PROCEDURE — 500331 HCHG COTTONOID, SURG PATTIE: Performed by: NEUROLOGICAL SURGERY

## 2021-03-29 PROCEDURE — A9270 NON-COVERED ITEM OR SERVICE: HCPCS | Performed by: NEUROLOGICAL SURGERY

## 2021-03-29 PROCEDURE — 83735 ASSAY OF MAGNESIUM: CPT

## 2021-03-29 PROCEDURE — 94669 MECHANICAL CHEST WALL OSCILL: CPT

## 2021-03-29 PROCEDURE — 160002 HCHG RECOVERY MINUTES (STAT): Performed by: NEUROLOGICAL SURGERY

## 2021-03-29 PROCEDURE — 700111 HCHG RX REV CODE 636 W/ 250 OVERRIDE (IP): Performed by: SURGERY

## 2021-03-29 PROCEDURE — 160009 HCHG ANES TIME/MIN: Performed by: NEUROLOGICAL SURGERY

## 2021-03-29 PROCEDURE — 502000 HCHG MISC OR IMPLANTS RC 0278: Performed by: NEUROLOGICAL SURGERY

## 2021-03-29 PROCEDURE — C1751 CATH, INF, PER/CENT/MIDLINE: HCPCS

## 2021-03-29 PROCEDURE — 85025 COMPLETE CBC W/AUTO DIFF WBC: CPT

## 2021-03-29 PROCEDURE — 700102 HCHG RX REV CODE 250 W/ 637 OVERRIDE(OP): Performed by: PHYSICIAN ASSISTANT

## 2021-03-29 PROCEDURE — 84100 ASSAY OF PHOSPHORUS: CPT

## 2021-03-29 PROCEDURE — 770022 HCHG ROOM/CARE - ICU (200)

## 2021-03-29 PROCEDURE — A9270 NON-COVERED ITEM OR SERVICE: HCPCS | Performed by: SURGERY

## 2021-03-29 PROCEDURE — 02HV33Z INSERTION OF INFUSION DEVICE INTO SUPERIOR VENA CAVA, PERCUTANEOUS APPROACH: ICD-10-PCS | Performed by: SURGERY

## 2021-03-29 PROCEDURE — 700111 HCHG RX REV CODE 636 W/ 250 OVERRIDE (IP): Performed by: NEUROLOGICAL SURGERY

## 2021-03-29 PROCEDURE — 700101 HCHG RX REV CODE 250: Performed by: NEUROLOGICAL SURGERY

## 2021-03-29 PROCEDURE — 700102 HCHG RX REV CODE 250 W/ 637 OVERRIDE(OP): Performed by: NEUROLOGICAL SURGERY

## 2021-03-29 PROCEDURE — 00CU0ZZ EXTIRPATION OF MATTER FROM SPINAL CANAL, OPEN APPROACH: ICD-10-PCS | Performed by: NEUROLOGICAL SURGERY

## 2021-03-29 PROCEDURE — 700101 HCHG RX REV CODE 250: Performed by: ANESTHESIOLOGY

## 2021-03-29 PROCEDURE — 93970 EXTREMITY STUDY: CPT

## 2021-03-29 PROCEDURE — 36556 INSERT NON-TUNNEL CV CATH: CPT

## 2021-03-29 PROCEDURE — 700105 HCHG RX REV CODE 258: Performed by: ANESTHESIOLOGY

## 2021-03-29 PROCEDURE — 700111 HCHG RX REV CODE 636 W/ 250 OVERRIDE (IP): Performed by: ANESTHESIOLOGY

## 2021-03-29 PROCEDURE — 160048 HCHG OR STATISTICAL LEVEL 1-5: Performed by: NEUROLOGICAL SURGERY

## 2021-03-29 PROCEDURE — 72141 MRI NECK SPINE W/O DYE: CPT

## 2021-03-29 PROCEDURE — 82962 GLUCOSE BLOOD TEST: CPT

## 2021-03-29 PROCEDURE — 85018 HEMOGLOBIN: CPT

## 2021-03-29 PROCEDURE — 72040 X-RAY EXAM NECK SPINE 2-3 VW: CPT

## 2021-03-29 PROCEDURE — 700111 HCHG RX REV CODE 636 W/ 250 OVERRIDE (IP): Performed by: PHYSICIAN ASSISTANT

## 2021-03-29 PROCEDURE — 700105 HCHG RX REV CODE 258: Performed by: SURGERY

## 2021-03-29 PROCEDURE — 160036 HCHG PACU - EA ADDL 30 MINS PHASE I: Performed by: NEUROLOGICAL SURGERY

## 2021-03-29 PROCEDURE — 0RG2071 FUSION OF 2 OR MORE CERVICAL VERTEBRAL JOINTS WITH AUTOLOGOUS TISSUE SUBSTITUTE, POSTERIOR APPROACH, POSTERIOR COLUMN, OPEN APPROACH: ICD-10-PCS | Performed by: NEUROLOGICAL SURGERY

## 2021-03-29 DEVICE — IMPLANTABLE DEVICE: Type: IMPLANTABLE DEVICE | Site: SPINE CERVICAL | Status: FUNCTIONAL

## 2021-03-29 DEVICE — SCREW SET SPINE STREAMLINE: Type: IMPLANTABLE DEVICE | Site: SPINE CERVICAL | Status: FUNCTIONAL

## 2021-03-29 RX ORDER — ONDANSETRON 4 MG/1
4 TABLET, ORALLY DISINTEGRATING ORAL EVERY 4 HOURS PRN
Status: DISCONTINUED | OUTPATIENT
Start: 2021-03-29 | End: 2021-04-14 | Stop reason: HOSPADM

## 2021-03-29 RX ORDER — OXYCODONE HYDROCHLORIDE 5 MG/1
5 TABLET ORAL
Status: DISCONTINUED | OUTPATIENT
Start: 2021-03-29 | End: 2021-03-29

## 2021-03-29 RX ORDER — HYDROMORPHONE HYDROCHLORIDE 1 MG/ML
0.2 INJECTION, SOLUTION INTRAMUSCULAR; INTRAVENOUS; SUBCUTANEOUS
Status: DISCONTINUED | OUTPATIENT
Start: 2021-03-29 | End: 2021-03-29 | Stop reason: HOSPADM

## 2021-03-29 RX ORDER — DIPHENHYDRAMINE HYDROCHLORIDE 50 MG/ML
25 INJECTION INTRAMUSCULAR; INTRAVENOUS EVERY 6 HOURS PRN
Status: DISCONTINUED | OUTPATIENT
Start: 2021-03-29 | End: 2021-03-29

## 2021-03-29 RX ORDER — ONDANSETRON 2 MG/ML
4 INJECTION INTRAMUSCULAR; INTRAVENOUS
Status: DISCONTINUED | OUTPATIENT
Start: 2021-03-29 | End: 2021-03-29 | Stop reason: HOSPADM

## 2021-03-29 RX ORDER — HYDROMORPHONE HYDROCHLORIDE 1 MG/ML
0.5 INJECTION, SOLUTION INTRAMUSCULAR; INTRAVENOUS; SUBCUTANEOUS
Status: DISCONTINUED | OUTPATIENT
Start: 2021-03-29 | End: 2021-03-29

## 2021-03-29 RX ORDER — DIPHENHYDRAMINE HYDROCHLORIDE 50 MG/ML
12.5 INJECTION INTRAMUSCULAR; INTRAVENOUS
Status: DISCONTINUED | OUTPATIENT
Start: 2021-03-29 | End: 2021-03-29 | Stop reason: HOSPADM

## 2021-03-29 RX ORDER — POLYETHYLENE GLYCOL 3350 17 G/17G
1 POWDER, FOR SOLUTION ORAL 2 TIMES DAILY PRN
Status: DISCONTINUED | OUTPATIENT
Start: 2021-03-29 | End: 2021-04-14 | Stop reason: HOSPADM

## 2021-03-29 RX ORDER — DOCUSATE SODIUM 100 MG/1
100 CAPSULE, LIQUID FILLED ORAL 2 TIMES DAILY
Status: DISCONTINUED | OUTPATIENT
Start: 2021-03-29 | End: 2021-04-14 | Stop reason: HOSPADM

## 2021-03-29 RX ORDER — LORAZEPAM 1 MG/1
0.5 TABLET ORAL EVERY 6 HOURS PRN
Status: DISCONTINUED | OUTPATIENT
Start: 2021-03-29 | End: 2021-03-29

## 2021-03-29 RX ORDER — HYDROMORPHONE HYDROCHLORIDE 1 MG/ML
0.1 INJECTION, SOLUTION INTRAMUSCULAR; INTRAVENOUS; SUBCUTANEOUS
Status: DISCONTINUED | OUTPATIENT
Start: 2021-03-29 | End: 2021-03-29 | Stop reason: HOSPADM

## 2021-03-29 RX ORDER — SODIUM CHLORIDE 9 MG/ML
1000 INJECTION, SOLUTION INTRAVENOUS ONCE
Status: COMPLETED | OUTPATIENT
Start: 2021-03-29 | End: 2021-03-29

## 2021-03-29 RX ORDER — SODIUM CHLORIDE 9 MG/ML
INJECTION, SOLUTION INTRAVENOUS CONTINUOUS
Status: DISCONTINUED | OUTPATIENT
Start: 2021-03-29 | End: 2021-04-05

## 2021-03-29 RX ORDER — BISACODYL 10 MG
10 SUPPOSITORY, RECTAL RECTAL
Status: DISCONTINUED | OUTPATIENT
Start: 2021-03-29 | End: 2021-04-03

## 2021-03-29 RX ORDER — LORAZEPAM 2 MG/ML
0.5 INJECTION INTRAMUSCULAR EVERY 6 HOURS PRN
Status: DISCONTINUED | OUTPATIENT
Start: 2021-03-29 | End: 2021-03-29

## 2021-03-29 RX ORDER — AMOXICILLIN 250 MG
1 CAPSULE ORAL NIGHTLY
Status: DISCONTINUED | OUTPATIENT
Start: 2021-03-29 | End: 2021-04-14 | Stop reason: HOSPADM

## 2021-03-29 RX ORDER — HYDROMORPHONE HYDROCHLORIDE 2 MG/ML
INJECTION, SOLUTION INTRAMUSCULAR; INTRAVENOUS; SUBCUTANEOUS PRN
Status: DISCONTINUED | OUTPATIENT
Start: 2021-03-29 | End: 2021-03-29

## 2021-03-29 RX ORDER — OXYCODONE HYDROCHLORIDE 10 MG/1
10 TABLET ORAL
Status: DISCONTINUED | OUTPATIENT
Start: 2021-03-29 | End: 2021-04-14 | Stop reason: HOSPADM

## 2021-03-29 RX ORDER — VANCOMYCIN HYDROCHLORIDE 1 G/20ML
INJECTION, POWDER, LYOPHILIZED, FOR SOLUTION INTRAVENOUS
Status: COMPLETED | OUTPATIENT
Start: 2021-03-29 | End: 2021-03-29

## 2021-03-29 RX ORDER — LABETALOL HYDROCHLORIDE 5 MG/ML
10 INJECTION, SOLUTION INTRAVENOUS
Status: DISCONTINUED | OUTPATIENT
Start: 2021-03-29 | End: 2021-04-05

## 2021-03-29 RX ORDER — MIDAZOLAM HYDROCHLORIDE 1 MG/ML
1 INJECTION INTRAMUSCULAR; INTRAVENOUS
Status: DISCONTINUED | OUTPATIENT
Start: 2021-03-29 | End: 2021-03-29 | Stop reason: HOSPADM

## 2021-03-29 RX ORDER — DEXAMETHASONE SODIUM PHOSPHATE 4 MG/ML
INJECTION, SOLUTION INTRA-ARTICULAR; INTRALESIONAL; INTRAMUSCULAR; INTRAVENOUS; SOFT TISSUE PRN
Status: DISCONTINUED | OUTPATIENT
Start: 2021-03-29 | End: 2021-03-30 | Stop reason: SURG

## 2021-03-29 RX ORDER — MEPERIDINE HYDROCHLORIDE 25 MG/ML
12.5 INJECTION INTRAMUSCULAR; INTRAVENOUS; SUBCUTANEOUS
Status: DISCONTINUED | OUTPATIENT
Start: 2021-03-29 | End: 2021-03-29 | Stop reason: HOSPADM

## 2021-03-29 RX ORDER — HYDROMORPHONE HYDROCHLORIDE 1 MG/ML
.5-1 INJECTION, SOLUTION INTRAMUSCULAR; INTRAVENOUS; SUBCUTANEOUS
Status: DISCONTINUED | OUTPATIENT
Start: 2021-03-29 | End: 2021-04-05

## 2021-03-29 RX ORDER — CEFAZOLIN SODIUM 1 G/3ML
INJECTION, POWDER, FOR SOLUTION INTRAMUSCULAR; INTRAVENOUS PRN
Status: DISCONTINUED | OUTPATIENT
Start: 2021-03-29 | End: 2021-03-30 | Stop reason: SURG

## 2021-03-29 RX ORDER — BACITRACIN ZINC 500 [USP'U]/G
OINTMENT TOPICAL
Status: DISCONTINUED | OUTPATIENT
Start: 2021-03-29 | End: 2021-03-29 | Stop reason: HOSPADM

## 2021-03-29 RX ORDER — SODIUM CHLORIDE, SODIUM LACTATE, POTASSIUM CHLORIDE, CALCIUM CHLORIDE 600; 310; 30; 20 MG/100ML; MG/100ML; MG/100ML; MG/100ML
INJECTION, SOLUTION INTRAVENOUS CONTINUOUS
Status: DISCONTINUED | OUTPATIENT
Start: 2021-03-29 | End: 2021-03-29 | Stop reason: HOSPADM

## 2021-03-29 RX ORDER — HYDRALAZINE HYDROCHLORIDE 20 MG/ML
10 INJECTION INTRAMUSCULAR; INTRAVENOUS
Status: DISCONTINUED | OUTPATIENT
Start: 2021-03-29 | End: 2021-04-05

## 2021-03-29 RX ORDER — HALOPERIDOL 5 MG/ML
1 INJECTION INTRAMUSCULAR
Status: DISCONTINUED | OUTPATIENT
Start: 2021-03-29 | End: 2021-03-29 | Stop reason: HOSPADM

## 2021-03-29 RX ORDER — OXYCODONE HYDROCHLORIDE 5 MG/1
5 TABLET ORAL
Status: DISCONTINUED | OUTPATIENT
Start: 2021-03-29 | End: 2021-04-14 | Stop reason: HOSPADM

## 2021-03-29 RX ORDER — ACETAMINOPHEN 500 MG
1000 TABLET ORAL EVERY 6 HOURS
Status: COMPLETED | OUTPATIENT
Start: 2021-03-29 | End: 2021-04-03

## 2021-03-29 RX ORDER — CALCIUM CARBONATE 500 MG/1
500 TABLET, CHEWABLE ORAL 2 TIMES DAILY
Status: DISCONTINUED | OUTPATIENT
Start: 2021-03-29 | End: 2021-04-14 | Stop reason: HOSPADM

## 2021-03-29 RX ORDER — MIDAZOLAM HYDROCHLORIDE 1 MG/ML
2 INJECTION INTRAMUSCULAR; INTRAVENOUS ONCE
Status: COMPLETED | OUTPATIENT
Start: 2021-03-29 | End: 2021-03-29

## 2021-03-29 RX ORDER — LIDOCAINE HYDROCHLORIDE 20 MG/ML
INJECTION, SOLUTION EPIDURAL; INFILTRATION; INTRACAUDAL; PERINEURAL PRN
Status: DISCONTINUED | OUTPATIENT
Start: 2021-03-29 | End: 2021-03-30 | Stop reason: SURG

## 2021-03-29 RX ORDER — CEFAZOLIN SODIUM 1 G/3ML
INJECTION, POWDER, FOR SOLUTION INTRAMUSCULAR; INTRAVENOUS
Status: DISCONTINUED | OUTPATIENT
Start: 2021-03-29 | End: 2021-03-29 | Stop reason: HOSPADM

## 2021-03-29 RX ORDER — MAGNESIUM SULFATE HEPTAHYDRATE 500 MG/ML
INJECTION, SOLUTION INTRAMUSCULAR; INTRAVENOUS PRN
Status: DISCONTINUED | OUTPATIENT
Start: 2021-03-29 | End: 2021-03-30 | Stop reason: SURG

## 2021-03-29 RX ORDER — OXYCODONE HCL 5 MG/5 ML
5 SOLUTION, ORAL ORAL
Status: DISCONTINUED | OUTPATIENT
Start: 2021-03-29 | End: 2021-03-29 | Stop reason: HOSPADM

## 2021-03-29 RX ORDER — LABETALOL HYDROCHLORIDE 5 MG/ML
5 INJECTION, SOLUTION INTRAVENOUS
Status: DISCONTINUED | OUTPATIENT
Start: 2021-03-29 | End: 2021-03-29 | Stop reason: HOSPADM

## 2021-03-29 RX ORDER — METHOCARBAMOL 750 MG/1
750 TABLET, FILM COATED ORAL EVERY 8 HOURS PRN
Status: DISCONTINUED | OUTPATIENT
Start: 2021-03-29 | End: 2021-04-14 | Stop reason: HOSPADM

## 2021-03-29 RX ORDER — OXYCODONE HYDROCHLORIDE 10 MG/1
10 TABLET ORAL
Status: DISCONTINUED | OUTPATIENT
Start: 2021-03-29 | End: 2021-03-29

## 2021-03-29 RX ORDER — HYDROMORPHONE HYDROCHLORIDE 1 MG/ML
0.4 INJECTION, SOLUTION INTRAMUSCULAR; INTRAVENOUS; SUBCUTANEOUS
Status: DISCONTINUED | OUTPATIENT
Start: 2021-03-29 | End: 2021-03-29 | Stop reason: HOSPADM

## 2021-03-29 RX ORDER — SODIUM CHLORIDE, SODIUM GLUCONATE, SODIUM ACETATE, POTASSIUM CHLORIDE AND MAGNESIUM CHLORIDE 526; 502; 368; 37; 30 MG/100ML; MG/100ML; MG/100ML; MG/100ML; MG/100ML
500 INJECTION, SOLUTION INTRAVENOUS CONTINUOUS
Status: DISCONTINUED | OUTPATIENT
Start: 2021-03-29 | End: 2021-03-29 | Stop reason: HOSPADM

## 2021-03-29 RX ORDER — ACETAMINOPHEN 325 MG/1
650 TABLET ORAL EVERY 6 HOURS
Status: DISCONTINUED | OUTPATIENT
Start: 2021-03-29 | End: 2021-03-29

## 2021-03-29 RX ORDER — CEFAZOLIN SODIUM 2 G/100ML
2 INJECTION, SOLUTION INTRAVENOUS EVERY 8 HOURS
Status: COMPLETED | OUTPATIENT
Start: 2021-03-29 | End: 2021-03-30

## 2021-03-29 RX ORDER — SODIUM CHLORIDE, SODIUM LACTATE, POTASSIUM CHLORIDE, CALCIUM CHLORIDE 600; 310; 30; 20 MG/100ML; MG/100ML; MG/100ML; MG/100ML
INJECTION, SOLUTION INTRAVENOUS
Status: DISCONTINUED | OUTPATIENT
Start: 2021-03-29 | End: 2021-03-30 | Stop reason: SURG

## 2021-03-29 RX ORDER — PROMETHAZINE HYDROCHLORIDE 25 MG/1
12.5-25 TABLET ORAL EVERY 4 HOURS PRN
Status: DISCONTINUED | OUTPATIENT
Start: 2021-03-29 | End: 2021-04-05

## 2021-03-29 RX ORDER — DIPHENHYDRAMINE HCL 25 MG
25 TABLET ORAL EVERY 6 HOURS PRN
Status: DISCONTINUED | OUTPATIENT
Start: 2021-03-29 | End: 2021-03-29

## 2021-03-29 RX ORDER — ENEMA 19; 7 G/133ML; G/133ML
1 ENEMA RECTAL
Status: DISCONTINUED | OUTPATIENT
Start: 2021-03-29 | End: 2021-04-14 | Stop reason: HOSPADM

## 2021-03-29 RX ORDER — GABAPENTIN 100 MG/1
200 CAPSULE ORAL 3 TIMES DAILY
Status: DISCONTINUED | OUTPATIENT
Start: 2021-03-29 | End: 2021-03-30

## 2021-03-29 RX ORDER — MIDAZOLAM HYDROCHLORIDE 1 MG/ML
INJECTION INTRAMUSCULAR; INTRAVENOUS PRN
Status: DISCONTINUED | OUTPATIENT
Start: 2021-03-29 | End: 2021-03-30 | Stop reason: SURG

## 2021-03-29 RX ORDER — ONDANSETRON 2 MG/ML
4 INJECTION INTRAMUSCULAR; INTRAVENOUS EVERY 4 HOURS PRN
Status: DISCONTINUED | OUTPATIENT
Start: 2021-03-29 | End: 2021-04-05

## 2021-03-29 RX ORDER — ROCURONIUM BROMIDE 10 MG/ML
INJECTION, SOLUTION INTRAVENOUS PRN
Status: DISCONTINUED | OUTPATIENT
Start: 2021-03-29 | End: 2021-03-30 | Stop reason: SURG

## 2021-03-29 RX ORDER — VITAMIN B COMPLEX
5000 TABLET ORAL DAILY
Status: DISCONTINUED | OUTPATIENT
Start: 2021-03-30 | End: 2021-04-14 | Stop reason: HOSPADM

## 2021-03-29 RX ORDER — HYDROMORPHONE HYDROCHLORIDE 1 MG/ML
0.25 INJECTION, SOLUTION INTRAMUSCULAR; INTRAVENOUS; SUBCUTANEOUS
Status: DISCONTINUED | OUTPATIENT
Start: 2021-03-29 | End: 2021-03-29

## 2021-03-29 RX ORDER — PROMETHAZINE HYDROCHLORIDE 25 MG/1
12.5-25 SUPPOSITORY RECTAL EVERY 4 HOURS PRN
Status: DISCONTINUED | OUTPATIENT
Start: 2021-03-29 | End: 2021-04-05

## 2021-03-29 RX ORDER — OXYCODONE HCL 5 MG/5 ML
10 SOLUTION, ORAL ORAL
Status: DISCONTINUED | OUTPATIENT
Start: 2021-03-29 | End: 2021-03-29 | Stop reason: HOSPADM

## 2021-03-29 RX ORDER — ACETAMINOPHEN 500 MG
1000 TABLET ORAL EVERY 6 HOURS PRN
Status: DISCONTINUED | OUTPATIENT
Start: 2021-04-03 | End: 2021-04-14 | Stop reason: HOSPADM

## 2021-03-29 RX ORDER — OXYCODONE HYDROCHLORIDE 5 MG/1
2.5 TABLET ORAL
Status: DISCONTINUED | OUTPATIENT
Start: 2021-03-29 | End: 2021-03-29

## 2021-03-29 RX ORDER — MIDODRINE HYDROCHLORIDE 5 MG/1
10 TABLET ORAL
Status: DISCONTINUED | OUTPATIENT
Start: 2021-03-29 | End: 2021-03-30

## 2021-03-29 RX ORDER — ONDANSETRON 2 MG/ML
INJECTION INTRAMUSCULAR; INTRAVENOUS PRN
Status: DISCONTINUED | OUTPATIENT
Start: 2021-03-29 | End: 2021-03-30 | Stop reason: SURG

## 2021-03-29 RX ORDER — AMOXICILLIN 250 MG
1 CAPSULE ORAL
Status: DISCONTINUED | OUTPATIENT
Start: 2021-03-29 | End: 2021-04-14 | Stop reason: HOSPADM

## 2021-03-29 RX ORDER — PROCHLORPERAZINE EDISYLATE 5 MG/ML
5-10 INJECTION INTRAMUSCULAR; INTRAVENOUS EVERY 4 HOURS PRN
Status: DISCONTINUED | OUTPATIENT
Start: 2021-03-29 | End: 2021-04-05

## 2021-03-29 RX ADMIN — OXYCODONE 5 MG: 5 TABLET ORAL at 23:25

## 2021-03-29 RX ADMIN — GABAPENTIN 200 MG: 100 CAPSULE ORAL at 11:41

## 2021-03-29 RX ADMIN — MAGNESIUM SULFATE HEPTAHYDRATE 2 G: 500 INJECTION, SOLUTION INTRAMUSCULAR; INTRAVENOUS at 13:46

## 2021-03-29 RX ADMIN — DOCUSATE SODIUM 50 MG AND SENNOSIDES 8.6 MG 1 TABLET: 8.6; 5 TABLET, FILM COATED ORAL at 20:16

## 2021-03-29 RX ADMIN — SODIUM CHLORIDE: 9 INJECTION, SOLUTION INTRAVENOUS at 11:41

## 2021-03-29 RX ADMIN — ROCURONIUM BROMIDE 10 MG: 10 INJECTION, SOLUTION INTRAVENOUS at 16:04

## 2021-03-29 RX ADMIN — LIDOCAINE HYDROCHLORIDE 100 MG: 20 INJECTION, SOLUTION EPIDURAL; INFILTRATION; INTRACAUDAL at 13:46

## 2021-03-29 RX ADMIN — PROPOFOL 150 MG: 10 INJECTION, EMULSION INTRAVENOUS at 13:46

## 2021-03-29 RX ADMIN — HYDROMORPHONE HYDROCHLORIDE 0.4 MG: 2 INJECTION, SOLUTION INTRAMUSCULAR; INTRAVENOUS; SUBCUTANEOUS at 13:59

## 2021-03-29 RX ADMIN — FAMOTIDINE 20 MG: 20 TABLET ORAL at 18:40

## 2021-03-29 RX ADMIN — DEXAMETHASONE SODIUM PHOSPHATE 4 MG: 4 INJECTION, SOLUTION INTRA-ARTICULAR; INTRALESIONAL; INTRAMUSCULAR; INTRAVENOUS; SOFT TISSUE at 13:46

## 2021-03-29 RX ADMIN — ONDANSETRON 4 MG: 2 INJECTION INTRAMUSCULAR; INTRAVENOUS at 13:46

## 2021-03-29 RX ADMIN — MIDODRINE HYDROCHLORIDE 10 MG: 5 TABLET ORAL at 11:41

## 2021-03-29 RX ADMIN — DOCUSATE SODIUM 100 MG: 100 CAPSULE, LIQUID FILLED ORAL at 20:16

## 2021-03-29 RX ADMIN — ACETAMINOPHEN 1000 MG: 500 TABLET, FILM COATED ORAL at 23:24

## 2021-03-29 RX ADMIN — CALCIUM CARBONATE 500 MG: 500 TABLET, CHEWABLE ORAL at 20:22

## 2021-03-29 RX ADMIN — GABAPENTIN 200 MG: 100 CAPSULE ORAL at 18:40

## 2021-03-29 RX ADMIN — MIDAZOLAM HYDROCHLORIDE 2 MG: 1 INJECTION, SOLUTION INTRAMUSCULAR; INTRAVENOUS at 13:43

## 2021-03-29 RX ADMIN — SODIUM CHLORIDE, POTASSIUM CHLORIDE, SODIUM LACTATE AND CALCIUM CHLORIDE: 600; 310; 30; 20 INJECTION, SOLUTION INTRAVENOUS at 13:43

## 2021-03-29 RX ADMIN — NOREPINEPHRINE BITARTRATE 17 MCG/MIN: 1 INJECTION INTRAVENOUS at 05:01

## 2021-03-29 RX ADMIN — NOREPINEPHRINE BITARTRATE 20 MCG/MIN: 1 INJECTION INTRAVENOUS at 18:39

## 2021-03-29 RX ADMIN — SODIUM CHLORIDE 1000 ML: 9 INJECTION, SOLUTION INTRAVENOUS at 22:01

## 2021-03-29 RX ADMIN — MIDAZOLAM HYDROCHLORIDE 2 MG: 1 INJECTION, SOLUTION INTRAMUSCULAR; INTRAVENOUS at 01:59

## 2021-03-29 RX ADMIN — ACETAMINOPHEN 650 MG: 325 TABLET ORAL at 11:42

## 2021-03-29 RX ADMIN — NOREPINEPHRINE BITARTRATE 15 MCG/MIN: 1 INJECTION, SOLUTION, CONCENTRATE INTRAVENOUS at 13:43

## 2021-03-29 RX ADMIN — CEFAZOLIN 2 G: 330 INJECTION, POWDER, FOR SOLUTION INTRAMUSCULAR; INTRAVENOUS at 13:55

## 2021-03-29 RX ADMIN — METHOCARBAMOL 750 MG: 750 TABLET ORAL at 22:02

## 2021-03-29 RX ADMIN — HYDROMORPHONE HYDROCHLORIDE 0.5 MG: 1 INJECTION, SOLUTION INTRAMUSCULAR; INTRAVENOUS; SUBCUTANEOUS at 18:40

## 2021-03-29 RX ADMIN — MIDODRINE HYDROCHLORIDE 10 MG: 5 TABLET ORAL at 18:40

## 2021-03-29 RX ADMIN — CEFAZOLIN SODIUM 2 G: 2 INJECTION, SOLUTION INTRAVENOUS at 22:00

## 2021-03-29 RX ADMIN — HYDROMORPHONE HYDROCHLORIDE 0.5 MG: 1 INJECTION, SOLUTION INTRAMUSCULAR; INTRAVENOUS; SUBCUTANEOUS at 01:20

## 2021-03-29 RX ADMIN — SODIUM CHLORIDE: 9 INJECTION, SOLUTION INTRAVENOUS at 23:53

## 2021-03-29 RX ADMIN — SODIUM CHLORIDE: 9 INJECTION, SOLUTION INTRAVENOUS at 05:17

## 2021-03-29 RX ADMIN — ACETAMINOPHEN 1000 MG: 500 TABLET, FILM COATED ORAL at 20:16

## 2021-03-29 RX ADMIN — FAMOTIDINE 20 MG: 10 INJECTION INTRAVENOUS at 05:16

## 2021-03-29 RX ADMIN — NOREPINEPHRINE BITARTRATE 15 MCG/MIN: 1 INJECTION INTRAVENOUS at 11:41

## 2021-03-29 RX ADMIN — ROCURONIUM BROMIDE 50 MG: 10 INJECTION, SOLUTION INTRAVENOUS at 13:46

## 2021-03-29 ASSESSMENT — ENCOUNTER SYMPTOMS
SHORTNESS OF BREATH: 0
SENSORY CHANGE: 1
CHEST TIGHTNESS: 0
HEADACHES: 1
ARTHRALGIAS: 0
NUMBNESS: 1
NECK PAIN: 1
FOCAL WEAKNESS: 1
CONSTITUTIONAL NEGATIVE: 1
WEAKNESS: 1
MYALGIAS: 1
RESPIRATORY NEGATIVE: 1
WHEEZING: 0
VOMITING: 0
CARDIOVASCULAR NEGATIVE: 1
DIZZINESS: 1
BACK PAIN: 0
ABDOMINAL PAIN: 0
GASTROINTESTINAL NEGATIVE: 1
NAUSEA: 0
STRIDOR: 0
EYES NEGATIVE: 1
PSYCHIATRIC NEGATIVE: 1
ABDOMINAL DISTENTION: 0

## 2021-03-29 ASSESSMENT — COPD QUESTIONNAIRES
HAVE YOU SMOKED AT LEAST 100 CIGARETTES IN YOUR ENTIRE LIFE: NO/DON'T KNOW
DURING THE PAST 4 WEEKS HOW MUCH DID YOU FEEL SHORT OF BREATH: NONE/LITTLE OF THE TIME
DO YOU EVER COUGH UP ANY MUCUS OR PHLEGM?: NO/ONLY WITH OCCASIONAL COLDS OR INFECTIONS
COPD SCREENING SCORE: 0

## 2021-03-29 ASSESSMENT — PATIENT HEALTH QUESTIONNAIRE - PHQ9
2. FEELING DOWN, DEPRESSED, IRRITABLE, OR HOPELESS: NOT AT ALL
1. LITTLE INTEREST OR PLEASURE IN DOING THINGS: NOT AT ALL
SUM OF ALL RESPONSES TO PHQ9 QUESTIONS 1 AND 2: 0

## 2021-03-29 ASSESSMENT — FIBROSIS 4 INDEX: FIB4 SCORE: 0.46

## 2021-03-29 ASSESSMENT — COGNITIVE AND FUNCTIONAL STATUS - GENERAL
MOVING FROM LYING ON BACK TO SITTING ON SIDE OF FLAT BED: A LOT
DRESSING REGULAR LOWER BODY CLOTHING: A LOT
WALKING IN HOSPITAL ROOM: A LOT
TOILETING: A LOT
STANDING UP FROM CHAIR USING ARMS: A LOT
EATING MEALS: A LOT
SUGGESTED CMS G CODE MODIFIER MOBILITY: CL
CLIMB 3 TO 5 STEPS WITH RAILING: A LOT
HELP NEEDED FOR BATHING: A LOT
DAILY ACTIVITIY SCORE: 12
SUGGESTED CMS G CODE MODIFIER DAILY ACTIVITY: CL
DRESSING REGULAR UPPER BODY CLOTHING: A LOT
TURNING FROM BACK TO SIDE WHILE IN FLAT BAD: A LOT
MOVING TO AND FROM BED TO CHAIR: A LOT
MOBILITY SCORE: 12
PERSONAL GROOMING: A LOT

## 2021-03-29 ASSESSMENT — PAIN DESCRIPTION - PAIN TYPE
TYPE: ACUTE PAIN
TYPE: ACUTE PAIN;NEUROPATHIC PAIN
TYPE: ACUTE PAIN
TYPE: SURGICAL PAIN
TYPE: SURGICAL PAIN
TYPE: ACUTE PAIN
TYPE: ACUTE PAIN
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
## 2021-03-29 NOTE — PROGRESS NOTES
Pt arrival to S115      Vitals:   · BP: 97/59  · Temp 88.3 f   · HR: 66  · RR: 19  · SaO2: 99  · Wt: 60.6 kg     _____________________________________________________________     2 RN skin check completed with MARCOS Hammer     Areas of concern/Skin observations:  · Laceration to head 2cm.   · Priapism present     Devices in use, assessed under and interventions (as appropriate) for skin protection:  · SCDs; [place] hugger; Hard C collar; temp lewis    Interventions in place such as:   · q2 hour turns  · Keeping skin clean and dry  · Waffle cushion while OOB: N/A  · Bed Type: Redistribution  · Mobility: Bedrest  · Rotating ET tube q2h (in coordination with RTs): N/A     ______________________________________________________________     Personal belongings:   · No personal belongings with sent home with family

## 2021-03-29 NOTE — PROCEDURES
"DATE OF OPERATION: 3/29/2021    PREOPERATIVE DIAGNOSIS: Need for central venous access, vasopressors    POSTOPERATIVE DIAGNOSIS: Same    PROCEDURE PERFORMED: Right subclavian central venous catheter placement 7.0 fr.     SURGEON: Jaylen Allan MD     ESTIMATED BLOOD LOSS: < 10 mL    PROCEDURE: Consent was obtained from family, the patient was properly identified and optimally positioned in bed. Maximal barrier precautions were employed. A\" time out\" was initiated. The correct patient, procedure, and operative site was verified. The patient's allergy status was assessed. Procedural modifications were made as required.   The right chest was prepped with chlorhexidine. The patient was placed in a shallow Trendelenburg position. The subclavian vein was accessed percutaneously and a wire advanced without resistance. A  triple lumen catheter was passed using sterile Selldinger technique and secured to the skin with silk sutures. All of the ports flushed and aspirated freely. The site was cleaned. An antibacterial disk was placed about the catheter exit site and dressed with a transparent sterile occlusive dressing.     The patient tolerated the procedure well. There were no apparent immediate complications. A stat portable chest radiograph was ordered.       __________________________   Jaylen Allan MD        DT: 3/29/2021  2:23 AM     "

## 2021-03-29 NOTE — PROGRESS NOTES
Page placed and returned by [facility]. Verified patient's logroll precautions. Per [place] Herring, keep patient logroll at this time. OK to get HOB to 30 degrees for sips with medications. Keep MAP >80 mmHg. Telephone orders implemented

## 2021-03-29 NOTE — ANESTHESIA PROCEDURE NOTES
Arterial Line  Performed by: Calixto Doherty M.D.  Authorized by: Calixto Doherty M.D.     Start Time:  3/29/2021 1:54 PM  End Time:  3/29/2021 1:52 PM  Localization: ultrasound guidance  Image captured, interpreted and electronically stored.  Patient Location:  OR  Indication: continuous blood pressure monitoring        Catheter Size:  20 G  Seldinger Technique?: Yes    Laterality:  Left  Site:  Radial artery  Line Secured:  Tape, transparent dressing and antimicrobial disc  Events: patient tolerated procedure well with no complications

## 2021-03-29 NOTE — OP REPORT
Immediate Post OP Note    PreOp Diagnosis: LINA B Spinal cord injury, Cervical vertebral fracture 5th and 6th vertebrae, cervical myelopathy    PostOp Diagnosis: LINA B Spinal cord injury, Cervical vertebral fracture 5th and 6th vertebrae, cervical myelopathy    Procedure(s):  FUSION SPINE CERVICAL C4-C7 POSTERIOR APPROACH WITH EXTENSION TO THORACIC SPINE - Wound Class: Clean  LAMINECTOMY SPINE CERVICAL POSTERIOR APPROACH C4-C7 WITH EXTENSION TO THORACIC SPINE - Wound Class: Clean    Surgeon(s):  Maxx Aguilar M.D.    Anesthesiologist/Type of Anesthesia:  Anesthesiologist: Calixto Doherty M.D./General    Surgical Staff:  Assistant: Osmel Lyons P.A.-C.  Circulator: Rashawn Chang R.N.  Relief Circulator: Barbara Mejia R.N.  Relief Scrub: Bonita Cheek  Scrub Person: Lindsay Easley  Radiology Technologist: Sariah Kearney    Specimens removed if any:  * No specimens in log *    Estimated Blood Loss: 50ml    Findings: Severe stenosis of central canal, multiple fractures    Complications: None, surgery went well         3/29/2021 4:50 PM Osmel Lyons P.A.-C.

## 2021-03-29 NOTE — PROGRESS NOTES
Pt was sized and fit w/ [place] collar, we're currently out of [place] J Collars. C-Spine Was supported and brace was fit snuggly and comfortably to Pt.

## 2021-03-29 NOTE — ASSESSMENT & PLAN NOTE
Prophylactic anticoagulation for thrombotic prevention initially contraindicated secondary to elevated bleeding risk.  3/29 Trauma surveillance venous duplex scanning - no superficial or deep venous thrombosis.   3/29 Lovenox approved by spine surgeon and initiated

## 2021-03-29 NOTE — PROGRESS NOTES
0030: Pt transported to MRI with CCT and ACLS RN on transport monitor and with MRI pump, see MAR. O2 tank verified to be full prior to transport. VSS    0110: Pt arrived back to the ICU and placed back on ICU monitors. VSS, no significant events.

## 2021-03-29 NOTE — CARE PLAN
Problem: Knowledge Deficit  Goal: Knowledge of disease process/condition, treatment plan, diagnostic tests, and medications will improve  Outcome: PROGRESSING AS EXPECTED  Note: Updated on plan of care and goals for the shift     Problem: Pain Management  Goal: Pain level will decrease to patient's comfort goal  Outcome: PROGRESSING AS EXPECTED  Note: Pharmacologic and non pharmacologic interventions in place.

## 2021-03-29 NOTE — ASSESSMENT & PLAN NOTE
Flexion of upper extremities. No   No movement of lower extremities.   No rectal tone on arrival to ED/ Priapism.   Spinous process fracture at C4  Vertebral body fractures at C5 and C6, and bilateral inferior facet fractures at C5.  Right C5 laminar fracture.  Cervical collar at all times  Spinal perfusion with MAP 85 mmHg  Definitive plan pending.  Maxx Caceres MD. Neurosurgeon. [facility].
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
## 2021-03-29 NOTE — ANESTHESIA PROCEDURE NOTES
Airway    Date/Time: 3/29/2021 1:47 PM  Performed by: Calixto Doherty M.D.  Authorized by: Calixto Doherty M.D.     Location:  OR  Urgency:  Elective  Indications for Airway Management:  Anesthesia      Spontaneous Ventilation: absent    Sedation Level:  Deep  Preoxygenated: Yes    MILS Maintained Throughout: Yes    Mask Difficulty Assessment:  1 - vent by mask  Final Airway Type:  Endotracheal airway  Final Endotracheal Airway:  ETT  Cuffed: Yes    Technique Used for Successful ETT Placement:  Video laryngoscopy  Devices/Methods Used in Placement:  Intubating stylet    Insertion Site:  Oral  Blade Type:  Glide  Laryngoscope Blade/Videolaryngoscope Blade Size:  3  ETT Size (mm):  7.5  Measured from:  Lips  ETT to Lips (cm):  21  Placement Verified by: capnometry    Cormack-Lehane Classification:  Grade I - full view of glottis  Number of Attempts at Approach:  1   Elective glidescope intubation with C-Collar in place- Gr I ETT 7.5

## 2021-03-29 NOTE — PROGRESS NOTES
"TRAUMA TERTIARY SURVEY     Mental status adequate for full examination?: Yes    Spine cleared (radiologically and/or clinically): No    PHYSICAL EXAMINATION:  Vitals: /63   Pulse 66   Temp (!) 30.4 °C (86.7 °F) (Bladder)   Resp 17   Ht 1.676 m (5' 5.98\")   Wt 60.6 kg (133 lb 9.6 oz)   SpO2 96%   BMI 21.57 kg/m²   Constitutional:     General Appearance: appears stated age, is in no apparent distress.  HEENT:    avulsuion on scalp- dermabond . The pupils are equal, round, and reactive to light bilaterally. The extraocular muscles are intact bilaterally.. The nares and oropharynx are clear. The midface and jaw are stable. No malocclusion is evident.  Neck:    The cervical spine is immobilized with a hard collar.  Respiratory:   Inspection: Unlabored respirations, no intercostal retractions, paradoxical motion, or accessory muscle use.   Palpation:  The chest is nontender. The clavicles are non deformed bilaterally..   Auscultation: normal, clear to auscultation.  Cardiovascular:   Auscultation: normal and regular rate and rhythm.   Peripheral Pulses: Normal.   Abdomen:   Abdomen is soft, nontender, without organomegaly or masses.  Genitourinary:   (MALE): normal male external genitalia.  Musculoskeletal:   The pelvis is stable. minimal sensation below C6 No lower leg movement  Back:   The the spine was examined. Examination is remarkable for tenderness in the cervical region.   Skin:   The skin is warm and dry.  Neurologic:    [place] Coma Scale (GCS) 15 [id number]. Neurologic examination revealed oriented for age x3.  Psychiatric:   The patient does not appear depressed or anxious.    IMAGING:  DX-CHEST-FOR LINE PLACEMENT Perform procedure in: Patient's Room   Final Result         1.  Pulmonary edema and/or infiltrates are identified, which are stable since the prior exam.      MR-CERVICAL SPINE-W/O   Final Result      1.  C5 and C6 diffuse marrow edema signal associated with burst fractures best seen on CT. "   2.  Prominent acute cord contusion with cord edema signal and cord swelling at C4-C6.   3.  No underlying degenerative changes.   4.  The case was discussed (preliminary call report) by Dr. Long with GISEL GRIER at 1:40 AM 3/29/2021.      CT-TSPINE W/O PLUS RECONS   Final Result         1.  No acute traumatic bony injury of the thoracic spine.   2.  C6 fracture      CT-LSPINE W/O PLUS RECONS   Final Result         1.  No acute traumatic bony injury of the lumbar spine.      CT-CHEST,ABDOMEN,PELVIS WITH   Final Result         1.  No acute abnormality.   2.  Hepatomegaly and mild hepatic steatosis      CT-HEAD W/O   Final Result         1.  No acute intracranial abnormality. Streak artifacts somewhat limits evaluation for subtle subarachnoid hemorrhages. Repeat CT of the head would be required for definitive exclusion of subtle intracranial injury.      CT-CTA NECK WITH & W/O-POST PROCESSING   Final Result         1.  CT angiogram of the neck within normal limits.   2.  Motion artifacts limit evaluation of the cervical spine, spinous process fracture at C4, vertebral body fractures at C5 and C6, and possible bilateral inferior facet fractures at C5 are seen. Right C5 laminar fracture. Repeat CT of the cervical spine    when stable for more definitive characterization recommended.         DX-CHEST-LIMITED (1 VIEW)   Final Result         1.  No acute cardiopulmonary disease.      US-ABORTED US PROCEDURE    (Results Pending)   US-TRAUMA VEIN SCREEN LOWER BILAT EXTREMITY    (Results Pending)     All current laboratory studies/radiology exams reviewed: Yes    Completed Consultations:  Neurosurgical     Pending Consultations:  NA    Newly Identified Diagnoses and Injuries:  NA    TOTAL RAP SCORE:  RAP Score Total: 6      ETOH Screening     Assessment complete date: 3/29/2021  Intervention: yes. Patient response to intervention: social drinker.    Patient does not agree to follow-up.   has not been  contacted.
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
## 2021-03-29 NOTE — CARE PLAN
Problem: Infection  Goal: Will remain free from infection  Intervention: Assess signs and symptoms of infection  Note: Standard precautions in place with proper hand hygiene.   Infection control discussed with patient  Monitoring trends in MEWS, labs, VS.   Monitoring for s/s infection       Problem: Respiratory:  Goal: Respiratory status will improve  Intervention: Assess and monitor pulmonary status  Note: Monitoring SpO2 continuously via pulse ox probe.   Encouraging and assisting patient to turn, perform strong cough and deep breathe.  Educating and encouraging on incentive spirometry usage.

## 2021-03-29 NOTE — ASSESSMENT & PLAN NOTE
Flexion of upper extremities. No   No movement of lower extremities.   No rectal tone on arrival to ED/ Priapism.   Vertebral body fractures at C5 and C6, and bilateral inferior facet fractures at C5.  Right C5 laminar fracture.  Cervical collar at all times  Spinal perfusion with MAP 85 mmHg x 7 days  C5 LINA B quadriplegia  3/29 OR for posterior laminectomy and fusion  Mobilize as tolerated in c-collar  Spinal cord rehab referrals in process - likely [facility]  Maxx Caceres MD. Neurosurgeon. [facility].
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
## 2021-03-29 NOTE — RESPIRATORY CARE
Respiratory Trauma Red Note      Pt arrived on NRB 15L/min, Spo2-94-97%, no other respiratory interventions at this time.

## 2021-03-29 NOTE — PROGRESS NOTES
Patient transported to preop accompanied by ACLS RN and CNA. Patient placed on transport monitor, portable oxygen on 1L nc and ambu bag available.     Attempted to call patient's parents, no answer at this time.     Patient placed on preop monitors.     Report given to Anesthesiologist at 1310.

## 2021-03-29 NOTE — PROGRESS NOTES
Central line placed by Dr. Allan. Verbal consent given by pt prior to procedure. Confirmed no known allergies. Time out called at 0200. Medications given, see MAR. Chest x ray ordered to verify placement.
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
## 2021-03-30 ENCOUNTER — APPOINTMENT (OUTPATIENT)
Dept: RADIOLOGY | Facility: MEDICAL CENTER | Age: 26
DRG: 028 | End: 2021-03-30
Attending: PHYSICIAN ASSISTANT
Payer: COMMERCIAL

## 2021-03-30 ENCOUNTER — APPOINTMENT (OUTPATIENT)
Dept: RADIOLOGY | Facility: MEDICAL CENTER | Age: 26
DRG: 028 | End: 2021-03-30
Attending: SURGERY
Payer: COMMERCIAL

## 2021-03-30 ENCOUNTER — HOSPITAL ENCOUNTER (INPATIENT)
Facility: REHABILITATION | Age: 26
End: 2021-03-30
Attending: PHYSICAL MEDICINE & REHABILITATION | Admitting: PHYSICAL MEDICINE & REHABILITATION
Payer: COMMERCIAL

## 2021-03-30 LAB
ALBUMIN SERPL BCP-MCNC: 3.5 G/DL (ref 3.2–4.9)
ALBUMIN/GLOB SERPL: 1.5 G/DL
ALP SERPL-CCNC: 67 U/L (ref 30–99)
ALT SERPL-CCNC: 16 U/L (ref 2–50)
ANION GAP SERPL CALC-SCNC: 9 MMOL/L (ref 7–16)
AST SERPL-CCNC: 32 U/L (ref 12–45)
BASOPHILS # BLD AUTO: 0.3 % (ref 0–1.8)
BASOPHILS # BLD: 0.04 K/UL (ref 0–0.12)
BILIRUB SERPL-MCNC: 0.9 MG/DL (ref 0.1–1.5)
BUN SERPL-MCNC: 10 MG/DL (ref 8–22)
CALCIUM SERPL-MCNC: 8 MG/DL (ref 8.5–10.5)
CHLORIDE SERPL-SCNC: 106 MMOL/L (ref 96–112)
CO2 SERPL-SCNC: 23 MMOL/L (ref 20–33)
CREAT SERPL-MCNC: 0.52 MG/DL (ref 0.5–1.4)
EOSINOPHIL # BLD AUTO: 0.01 K/UL (ref 0–0.51)
EOSINOPHIL NFR BLD: 0.1 % (ref 0–6.9)
ERYTHROCYTE [DISTWIDTH] IN BLOOD BY AUTOMATED COUNT: 41.1 FL (ref 35.9–50)
GLOBULIN SER CALC-MCNC: 2.3 G/DL (ref 1.9–3.5)
GLUCOSE BLD-MCNC: 102 MG/DL (ref 65–99)
GLUCOSE BLD-MCNC: 104 MG/DL (ref 65–99)
GLUCOSE SERPL-MCNC: 107 MG/DL (ref 65–99)
HCT VFR BLD AUTO: 33.8 % (ref 42–52)
HGB BLD-MCNC: 11.8 G/DL (ref 14–18)
IMM GRANULOCYTES # BLD AUTO: 0.07 K/UL (ref 0–0.11)
IMM GRANULOCYTES NFR BLD AUTO: 0.5 % (ref 0–0.9)
LYMPHOCYTES # BLD AUTO: 2.58 K/UL (ref 1–4.8)
LYMPHOCYTES NFR BLD: 17.2 % (ref 22–41)
MAGNESIUM SERPL-MCNC: 1.7 MG/DL (ref 1.5–2.5)
MCH RBC QN AUTO: 30.3 PG (ref 27–33)
MCHC RBC AUTO-ENTMCNC: 34.9 G/DL (ref 33.7–35.3)
MCV RBC AUTO: 86.7 FL (ref 81.4–97.8)
MONOCYTES # BLD AUTO: 1.09 K/UL (ref 0–0.85)
MONOCYTES NFR BLD AUTO: 7.3 % (ref 0–13.4)
NEUTROPHILS # BLD AUTO: 11.22 K/UL (ref 1.82–7.42)
NEUTROPHILS NFR BLD: 74.6 % (ref 44–72)
NRBC # BLD AUTO: 0 K/UL
NRBC BLD-RTO: 0 /100 WBC
PHOSPHATE SERPL-MCNC: 2.2 MG/DL (ref 2.5–4.5)
PLATELET # BLD AUTO: 245 K/UL (ref 164–446)
PMV BLD AUTO: 9.3 FL (ref 9–12.9)
POTASSIUM SERPL-SCNC: 4.1 MMOL/L (ref 3.6–5.5)
PROT SERPL-MCNC: 5.8 G/DL (ref 6–8.2)
RBC # BLD AUTO: 3.9 M/UL (ref 4.7–6.1)
SODIUM SERPL-SCNC: 138 MMOL/L (ref 135–145)
WBC # BLD AUTO: 15 K/UL (ref 4.8–10.8)

## 2021-03-30 PROCEDURE — A9270 NON-COVERED ITEM OR SERVICE: HCPCS | Performed by: PHYSICAL MEDICINE & REHABILITATION

## 2021-03-30 PROCEDURE — A9270 NON-COVERED ITEM OR SERVICE: HCPCS | Performed by: PHYSICIAN ASSISTANT

## 2021-03-30 PROCEDURE — 700101 HCHG RX REV CODE 250: Performed by: SURGERY

## 2021-03-30 PROCEDURE — 99291 CRITICAL CARE FIRST HOUR: CPT | Performed by: SURGERY

## 2021-03-30 PROCEDURE — 700102 HCHG RX REV CODE 250 W/ 637 OVERRIDE(OP): Performed by: PHYSICIAN ASSISTANT

## 2021-03-30 PROCEDURE — 700111 HCHG RX REV CODE 636 W/ 250 OVERRIDE (IP): Performed by: PHYSICIAN ASSISTANT

## 2021-03-30 PROCEDURE — 700101 HCHG RX REV CODE 250: Performed by: NEUROLOGICAL SURGERY

## 2021-03-30 PROCEDURE — 700105 HCHG RX REV CODE 258: Performed by: NEUROLOGICAL SURGERY

## 2021-03-30 PROCEDURE — A9270 NON-COVERED ITEM OR SERVICE: HCPCS | Performed by: SURGERY

## 2021-03-30 PROCEDURE — 700111 HCHG RX REV CODE 636 W/ 250 OVERRIDE (IP): Performed by: SURGERY

## 2021-03-30 PROCEDURE — 85025 COMPLETE CBC W/AUTO DIFF WBC: CPT

## 2021-03-30 PROCEDURE — 83735 ASSAY OF MAGNESIUM: CPT

## 2021-03-30 PROCEDURE — 99223 1ST HOSP IP/OBS HIGH 75: CPT | Performed by: PHYSICAL MEDICINE & REHABILITATION

## 2021-03-30 PROCEDURE — 82962 GLUCOSE BLOOD TEST: CPT

## 2021-03-30 PROCEDURE — 97167 OT EVAL HIGH COMPLEX 60 MIN: CPT

## 2021-03-30 PROCEDURE — 700102 HCHG RX REV CODE 250 W/ 637 OVERRIDE(OP): Performed by: SURGERY

## 2021-03-30 PROCEDURE — 84100 ASSAY OF PHOSPHORUS: CPT

## 2021-03-30 PROCEDURE — 80053 COMPREHEN METABOLIC PANEL: CPT

## 2021-03-30 PROCEDURE — 71045 X-RAY EXAM CHEST 1 VIEW: CPT

## 2021-03-30 PROCEDURE — 700105 HCHG RX REV CODE 258: Performed by: SURGERY

## 2021-03-30 PROCEDURE — 97162 PT EVAL MOD COMPLEX 30 MIN: CPT

## 2021-03-30 PROCEDURE — 770022 HCHG ROOM/CARE - ICU (200)

## 2021-03-30 PROCEDURE — 700102 HCHG RX REV CODE 250 W/ 637 OVERRIDE(OP): Performed by: PHYSICAL MEDICINE & REHABILITATION

## 2021-03-30 PROCEDURE — 72125 CT NECK SPINE W/O DYE: CPT

## 2021-03-30 PROCEDURE — 94669 MECHANICAL CHEST WALL OSCILL: CPT

## 2021-03-30 RX ORDER — BACLOFEN 10 MG/1
10 TABLET ORAL 3 TIMES DAILY
Status: DISCONTINUED | OUTPATIENT
Start: 2021-03-30 | End: 2021-04-14 | Stop reason: HOSPADM

## 2021-03-30 RX ORDER — GUAIFENESIN 600 MG/1
600 TABLET, EXTENDED RELEASE ORAL EVERY 12 HOURS
Status: DISCONTINUED | OUTPATIENT
Start: 2021-03-30 | End: 2021-04-14 | Stop reason: HOSPADM

## 2021-03-30 RX ORDER — MIDODRINE HYDROCHLORIDE 5 MG/1
10 TABLET ORAL EVERY 8 HOURS
Status: DISCONTINUED | OUTPATIENT
Start: 2021-03-30 | End: 2021-04-14 | Stop reason: HOSPADM

## 2021-03-30 RX ORDER — GABAPENTIN 400 MG/1
400 CAPSULE ORAL 3 TIMES DAILY
Status: DISCONTINUED | OUTPATIENT
Start: 2021-03-30 | End: 2021-03-31

## 2021-03-30 RX ADMIN — GABAPENTIN 400 MG: 400 CAPSULE ORAL at 17:20

## 2021-03-30 RX ADMIN — ACETAMINOPHEN 1000 MG: 500 TABLET, FILM COATED ORAL at 13:48

## 2021-03-30 RX ADMIN — HYDROMORPHONE HYDROCHLORIDE 0.5 MG: 1 INJECTION, SOLUTION INTRAMUSCULAR; INTRAVENOUS; SUBCUTANEOUS at 05:44

## 2021-03-30 RX ADMIN — GABAPENTIN 200 MG: 100 CAPSULE ORAL at 13:48

## 2021-03-30 RX ADMIN — SODIUM PHOSPHATE, MONOBASIC, MONOHYDRATE AND SODIUM PHOSPHATE, DIBASIC, ANHYDROUS 30 MMOL: 276; 142 INJECTION, SOLUTION INTRAVENOUS at 09:27

## 2021-03-30 RX ADMIN — FAMOTIDINE 20 MG: 20 TABLET ORAL at 17:20

## 2021-03-30 RX ADMIN — MIDODRINE HYDROCHLORIDE 10 MG: 5 TABLET ORAL at 22:05

## 2021-03-30 RX ADMIN — BACLOFEN 10 MG: 10 TABLET ORAL at 17:19

## 2021-03-30 RX ADMIN — MIDODRINE HYDROCHLORIDE 10 MG: 5 TABLET ORAL at 08:16

## 2021-03-30 RX ADMIN — METHOCARBAMOL 750 MG: 750 TABLET ORAL at 22:05

## 2021-03-30 RX ADMIN — DOCUSATE SODIUM 100 MG: 100 CAPSULE, LIQUID FILLED ORAL at 17:20

## 2021-03-30 RX ADMIN — POLYETHYLENE GLYCOL 3350 1 PACKET: 17 POWDER, FOR SOLUTION ORAL at 17:20

## 2021-03-30 RX ADMIN — MIDODRINE HYDROCHLORIDE 10 MG: 5 TABLET ORAL at 13:48

## 2021-03-30 RX ADMIN — GUAIFENESIN 600 MG: 600 TABLET, EXTENDED RELEASE ORAL at 17:20

## 2021-03-30 RX ADMIN — MAGNESIUM HYDROXIDE 30 ML: 400 SUSPENSION ORAL at 05:11

## 2021-03-30 RX ADMIN — OXYCODONE 5 MG: 5 TABLET ORAL at 20:28

## 2021-03-30 RX ADMIN — CALCIUM CARBONATE 500 MG: 500 TABLET, CHEWABLE ORAL at 17:20

## 2021-03-30 RX ADMIN — OXYCODONE HYDROCHLORIDE 10 MG: 10 TABLET ORAL at 13:48

## 2021-03-30 RX ADMIN — NOREPINEPHRINE BITARTRATE 10 MCG/MIN: 1 INJECTION INTRAVENOUS at 17:08

## 2021-03-30 RX ADMIN — ACETAMINOPHEN 1000 MG: 500 TABLET, FILM COATED ORAL at 17:19

## 2021-03-30 RX ADMIN — NOREPINEPHRINE BITARTRATE 13 MCG/MIN: 1 INJECTION INTRAVENOUS at 03:30

## 2021-03-30 RX ADMIN — OXYCODONE HYDROCHLORIDE 10 MG: 10 TABLET ORAL at 23:42

## 2021-03-30 RX ADMIN — POLYETHYLENE GLYCOL 3350 1 PACKET: 17 POWDER, FOR SOLUTION ORAL at 05:11

## 2021-03-30 RX ADMIN — DOCUSATE SODIUM 50 MG AND SENNOSIDES 8.6 MG 1 TABLET: 8.6; 5 TABLET, FILM COATED ORAL at 20:27

## 2021-03-30 RX ADMIN — SODIUM CHLORIDE: 9 INJECTION, SOLUTION INTRAVENOUS at 22:00

## 2021-03-30 RX ADMIN — FAMOTIDINE 20 MG: 20 TABLET ORAL at 05:10

## 2021-03-30 RX ADMIN — OXYCODONE 5 MG: 5 TABLET ORAL at 08:16

## 2021-03-30 RX ADMIN — ACETAMINOPHEN 1000 MG: 500 TABLET, FILM COATED ORAL at 23:38

## 2021-03-30 RX ADMIN — ACETAMINOPHEN 1000 MG: 500 TABLET, FILM COATED ORAL at 05:10

## 2021-03-30 RX ADMIN — Medication 5000 UNITS: at 05:11

## 2021-03-30 RX ADMIN — CEFAZOLIN SODIUM 2 G: 2 INJECTION, SOLUTION INTRAVENOUS at 05:11

## 2021-03-30 RX ADMIN — DOCUSATE SODIUM 100 MG: 100 CAPSULE, LIQUID FILLED ORAL at 05:11

## 2021-03-30 RX ADMIN — GABAPENTIN 200 MG: 100 CAPSULE ORAL at 05:10

## 2021-03-30 RX ADMIN — ENOXAPARIN SODIUM 40 MG: 40 INJECTION SUBCUTANEOUS at 17:20

## 2021-03-30 RX ADMIN — CALCIUM CARBONATE 500 MG: 500 TABLET, CHEWABLE ORAL at 05:10

## 2021-03-30 ASSESSMENT — COGNITIVE AND FUNCTIONAL STATUS - GENERAL
DRESSING REGULAR UPPER BODY CLOTHING: TOTAL
CLIMB 3 TO 5 STEPS WITH RAILING: TOTAL
SUGGESTED CMS G CODE MODIFIER DAILY ACTIVITY: CN
HELP NEEDED FOR BATHING: TOTAL
DAILY ACTIVITIY SCORE: 6
DRESSING REGULAR LOWER BODY CLOTHING: TOTAL
MOVING FROM LYING ON BACK TO SITTING ON SIDE OF FLAT BED: UNABLE
WALKING IN HOSPITAL ROOM: TOTAL
TURNING FROM BACK TO SIDE WHILE IN FLAT BAD: UNABLE
MOBILITY SCORE: 6
EATING MEALS: TOTAL
STANDING UP FROM CHAIR USING ARMS: TOTAL
MOVING TO AND FROM BED TO CHAIR: UNABLE
PERSONAL GROOMING: TOTAL
SUGGESTED CMS G CODE MODIFIER MOBILITY: CN
TOILETING: TOTAL

## 2021-03-30 ASSESSMENT — PAIN DESCRIPTION - PAIN TYPE
TYPE: ACUTE PAIN

## 2021-03-30 ASSESSMENT — FIBROSIS 4 INDEX: FIB4 SCORE: 0.75

## 2021-03-30 ASSESSMENT — LIFESTYLE VARIABLES
ON A TYPICAL DAY WHEN YOU DRINK ALCOHOL HOW MANY DRINKS DO YOU HAVE: 0
TOTAL SCORE: 0
TOTAL SCORE: 0
HOW MANY TIMES IN THE PAST YEAR HAVE YOU HAD 5 OR MORE DRINKS IN A DAY: 0
HAVE PEOPLE ANNOYED YOU BY CRITICIZING YOUR DRINKING: NO
ALCOHOL_USE: NO
CONSUMPTION TOTAL: NEGATIVE
DOES PATIENT WANT TO STOP DRINKING: NO
EVER FELT BAD OR GUILTY ABOUT YOUR DRINKING: NO
AVERAGE NUMBER OF DAYS PER WEEK YOU HAVE A DRINK CONTAINING ALCOHOL: 0
TOTAL SCORE: 0
HAVE YOU EVER FELT YOU SHOULD CUT DOWN ON YOUR DRINKING: NO
EVER HAD A DRINK FIRST THING IN THE MORNING TO STEADY YOUR NERVES TO GET RID OF A HANGOVER: NO

## 2021-03-30 ASSESSMENT — PAIN SCALES - GENERAL: PAIN_LEVEL: 2

## 2021-03-30 ASSESSMENT — GAIT ASSESSMENTS: GAIT LEVEL OF ASSIST: UNABLE TO PARTICIPATE

## 2021-03-30 ASSESSMENT — ACTIVITIES OF DAILY LIVING (ADL): TOILETING: INDEPENDENT

## 2021-03-30 NOTE — PROGRESS NOTES
Patient arrived to S115 at 1800.   Placed on ICU monitor     Patient A&Ox4, c/o burning/pins/needles sensation from posterior neck down. Medications given, see MAR

## 2021-03-30 NOTE — THERAPY
Occupational Therapy   Initial Evaluation     Patient Name: Enrique Blackmon  Age:  25 y.o., Sex:  male  Medical Record #: [id number]  Today's Date: 3/30/2021     Precautions  Precautions: (P) Fall Risk, Spinal / Back Precautions , Cervical Collar    Comments: (P) C collar on at all times    Assessment  Patient is 25 y.o. male with a diagnosis of SCI.  Pt currently limited by decreased functional mobility, activity tolerance, sensation, strength, AROM, coordination, balance, and pain which are affecting pt's ability to complete ADLs/IADLs at baseline. Pt would benefit from OT services in the acute care setting to maximize functional recovery.       Plan    Recommend Occupational Therapy 4 times per week until therapy goals are met for the following treatments:  Adaptive Equipment, Self Care/Activities of Daily Living and Therapeutic Activities.       Discharge Recommendations: (P) Recommend post-acute placement for additional occupational therapy services prior to discharge home        03/30/21 5832   Prior Living Situation   Prior Services None   Housing / Facility 1 Story House   Equipment Owned None   Lives with - Patient's Self Care Capacity Unrelated Adult  (plan is to rehab in [facility] near parents)   Prior Level of ADL Function   Self Feeding Independent   Grooming / Hygiene Independent   Bathing Independent   Dressing Independent   Toileting Independent   ADL Assessment   Grooming Total Assist   Upper Body Dressing Total Assist   Lower Body Dressing Total Assist   Toileting Total Assist   Functional Mobility   Sit to Stand Unable to Participate   Bed, Chair, Wheelchair Transfer Unable to Participate   Short Term Goals   Short Term Goal # 1 min A to self feed with Ucuff    Short Term Goal # 2 min A with UB dressing
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
## 2021-03-30 NOTE — PROGRESS NOTES
Spoke with INDIANA Johnson regarding patient's PRN pain medication orders. Orders received and implemented. Per PA okay to resume patient's opioid pain medication from prior to surgery. Per PA also okay to discontinue log roll order and mobility restriction.

## 2021-03-30 NOTE — OR NURSING
1718 Dr. Aguilar at bedside checking and testing Pt. Pt was only able to hold his Left forearm up for 2 secs. Left upper arm resting on bed. Pt unable to move his wrists. Will continue to monitor Pt closely.

## 2021-03-30 NOTE — DISCHARGE PLANNING
[facility] Transitional Care Coordination    Referral from:  KEDAR Johnson    Insurance Provider on Facesheet:ANT    Potential Rehab Diagnosis: TSCI    Chart review indicates patient may have on going medical management and may have therapy needs to possibly meet inpatient rehab facility criteria with the goal of returning to community.    D/C support: TBD     Physiatry consultation forwarded per protocol.     TSCI.  Would appreciate [place] dunia once appropriate.  Physiatry to consult.  Waiting on additional information to determine appropriateness for acute inpatient rehabilitation. Will continue to follow.      Thank you for the referral.

## 2021-03-30 NOTE — PROGRESS NOTES
Neurosurgery Progress Note    Subjective:  POD#1 C4 - C7 laminectomy with fusion for C5 LINA B SCI.   C/O pain at op site.  Feels as though he has a little better motion of the upper ext. Sensation is unchanged.    Exam:  2/5 bilateral deltoid  2+3/5 bilateral biceps and triceps  Minimal   0/5 to lowers  Light sensation intact to lowers. Cannot localize well.    BP  Min: 125/58  Max: 153/70  Pulse  Av.2  Min: 51  Max: 79  Resp  Av.3  Min: 11  Max: 21  Temp  Av.5 °C (97.7 °F)  Min: 36.2 °C (97.1 °F)  Max: 36.8 °C (98.2 °F)  Monitored Temp 2  Av.2 °C (98.9 °F)  Min: 36.7 °C (98 °F)  Max: 37.8 °C (100 °F)  SpO2  Av.3 %  Min: 96 %  Max: 100 %    No data recorded    Recent Labs     21  0530   WBC 7.9  --  24.0*  --  15.0*   RBC 4.33*  --  4.55*  --  3.90*   HEMOGLOBIN 13.3*   < > 13.8* 13.4* 11.8*   HEMATOCRIT 38.2*  --  39.8*  --  33.8*   MCV 88.2  --  87.5  --  86.7   MCH 30.7  --  30.3  --  30.3   MCHC 34.8  --  34.7  --  34.9   RDW 42.0  --  41.4  --  41.1   PLATELETCT 248  --  323  --  245   MPV 9.5  --  9.9  --  9.3    < > = values in this interval not displayed.     Recent Labs     21  0530   SODIUM 137 137 138   POTASSIUM 3.3* 3.9 4.1   CHLORIDE 101 104 106   CO2 20 20 23   GLUCOSE 94 107* 107*   BUN 17 15 10   CREATININE 0.77 0.66 0.52   CALCIUM 9.4 9.0 8.0*     Recent Labs     21   APTT 24.8   INR 1.06     Recent Labs     21   REACTMIN 3.8*   CLOTKINET 2.2   CLOTANGL 62.6   MAXCLOTS 60.0   GKO00SEQ 0.2   PRCINADP 0.0   PRCINAA 0.0       Intake/Output       21 0700 - 21 0659 21 - 2159       Total [id number] Total       Intake    P.O.  60  500 560  --  -- --    P.O. 60 500 560 -- -- --    I.V.  2275.1  2294 4569.1  --  -- --    Norepinephrine Volume 475.1 347.3 822.4 -- -- --    Volume (mL) (NS  infusion) 1800 412.5 2212.5 -- -- --    Volume (mL) (NS infusion) -- 1534.2 1534.2 -- -- --    IV Piggyback  --  1200 1200  --  -- --    Volume (mL) (NS (BOLUS) infusion 1,000 mL) -- 1000 1000 -- -- --    Volume (mL) (ceFAZolin in dextrose (ANCEF) IVPB premix 2 g) -- 200 200 -- -- --    Total Intake 2335.1 3994 6329.1 -- -- --       Output    Urine  2100  3125 5225  --  -- --    Output (mL) (Urethral Catheter Temperature probe) 2100 3125 5225 -- -- --    Drains  0  65 65  --  -- --    Output (mL) (Closed/Suction Drain 1 Posterior Neck Hemovac ) 0 65 65 -- -- --    Stool  --  -- --  --  -- --    Number of Times Stooled -- 0 x 0 x -- -- --    Total Output 2100 3190 5290 -- -- --       Net I/O     235.1 804 1039.1 -- -- --            Intake/Output Summary (Last 24 hours) at 3/30/2021 0828  Last data filed at 3/30/2021 0600  Gross per 24 hour   Intake 5965.27 ml   Output 4915 ml   Net 1050.27 ml            • gabapentin  200 mg TID   • midodrine  10 mg TID WITH MEALS   • Pharmacy Consult Request  1 Each PHARMACY TO DOSE   • MD ALERT...DO NOT ADMINISTER NSAIDS or ASPIRIN unless ORDERED By Neurosurgery  1 Each PRN   • docusate sodium  100 mg BID   • senna-docusate  1 tablet Nightly   • senna-docusate  1 tablet Q24HRS PRN   • polyethylene glycol/lytes  1 Packet BID PRN   • magnesium hydroxide  30 mL QDAY PRN   • bisacodyl  10 mg Q24HRS PRN   • fleet  1 Each Once PRN   • NS   Continuous   • enoxaparin  40 mg DAILY AT 1800   • acetaminophen  1,000 mg Q6HRS    Followed by   • [START ON 4/3/2021] acetaminophen  1,000 mg Q6HRS PRN   • ondansetron  4 mg Q4HRS PRN   • ondansetron  4 mg Q4HRS PRN   • promethazine  12.5-25 mg Q4HRS PRN   • promethazine  12.5-25 mg Q4HRS PRN   • prochlorperazine  5-10 mg Q4HRS PRN   • methocarbamol  750 mg Q8HRS PRN   • labetalol  10 mg Q HOUR PRN   • hydrALAZINE  10 mg Q HOUR PRN   • benzocaine-menthol  1 Lozenge Q2HRS PRN   • calcium carbonate  500 mg BID   • vitamin D  5,000 Units DAILY   •  HYDROmorphone  0.5-1 mg Q HOUR PRN    Or   • oxyCODONE immediate-release  5 mg Q3HRS PRN    Or   • oxyCODONE immediate-release  10 mg Q3HRS PRN   • Respiratory Therapy Consult   Continuous RT   • Pharmacy Consult Request  1 Each PHARMACY TO DOSE   • polyethylene glycol/lytes  1 Packet BID   • magnesium hydroxide  30 mL DAILY   • famotidine  20 mg BID    Or   • famotidine  20 mg BID   • insulin regular  1-6 Units Q6HRS    And   • glucose  16 g Q15 MIN PRN    And   • dextrose 50%  50 mL Q15 MIN PRN   • norepinephrine (Levophed) infusion  0-30 mcg/min Continuous       Assessment and Plan:  Hospital day #2  POD #1 C4 - C7 laminectomy and fusion  OK to mobilize as able with collar  MAPs >80 for 6 days more  Will need rehab.   Prophylactic anticoagulation: yes         Start date/time: now

## 2021-03-30 NOTE — ANESTHESIA POSTPROCEDURE EVALUATION
Patient: Enrique Blackmon    Procedure Summary     Date: 03/29/21 Room / Location: [facility] 04 / SURGERY [facility]    Anesthesia Start: 1343 Anesthesia Stop: 1700    Procedures:       FUSION SPINE CERVICAL C4-C7 POSTERIOR APPROACH WITH EXTENSION TO THORACIC SPINE (N/A Spine Cervical)      LAMINECTOMY SPINE CERVICAL POSTERIOR APPROACH C4-C7 WITH EXTENSION TO THORACIC SPINE (N/A Spine Cervical) Diagnosis: (CERVICAL STENOSIS, C6 VERTEBRAL FRACTURE WITH MYELOPATHY, LINA B SPINAL CORD INJURY)    Surgeons: Maxx Aguilar M.D. Responsible Provider: Calixto Doherty M.D.    Anesthesia Type: general ASA Status: 3          Final Anesthesia Type: general  Last vitals  BP   Blood Pressure: 135/60, Arterial BP: 119/69    Temp   36.8 °C (98.2 °F)    Pulse   75   Resp   20    SpO2   94 %      Anesthesia Post Evaluation    Patient location during evaluation: PACU  Patient participation: complete - patient participated  Level of consciousness: awake and alert  Pain score: 2    Airway patency: patent  Anesthetic complications: no  Cardiovascular status: hemodynamically stable  Respiratory status: acceptable  Hydration status: acceptable    PONV: none          No complications documented.     Nurse Pain Score: 2 (NPRS)

## 2021-03-30 NOTE — PROGRESS NOTES
Spoke with Dr. Ross regarding patient updates. Orders received and implemented. Placed order for 1L bolus of NS and increased continuous fluids to  mL/hr per telephone with read back.

## 2021-03-30 NOTE — DISCHARGE PLANNING
Received Choice form at 1400  Agency/Facility Name: [facility]  Referral sent per Choice form @ 7049      RN CM informed
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
## 2021-03-30 NOTE — CARE PLAN
Problem: Communication  Goal: The ability to communicate needs accurately and effectively will improve  Outcome: PROGRESSING AS EXPECTED     Problem: Safety  Goal: Will remain free from injury  Outcome: PROGRESSING AS EXPECTED  Goal: Will remain free from falls  Outcome: PROGRESSING AS EXPECTED     Problem: Infection  Goal: Will remain free from infection  Outcome: PROGRESSING AS EXPECTED     Problem: Venous Thromboembolism (VTW)/Deep Vein Thrombosis (DVT) Prevention:  Goal: Patient will participate in Venous Thrombosis (VTE)/Deep Vein Thrombosis (DVT)Prevention Measures  Outcome: PROGRESSING AS EXPECTED     Problem: Bowel/Gastric:  Goal: Normal bowel function is maintained or improved  Outcome: PROGRESSING AS EXPECTED  Goal: Will not experience complications related to bowel motility  Outcome: PROGRESSING AS EXPECTED     Problem: Knowledge Deficit  Goal: Knowledge of disease process/condition, treatment plan, diagnostic tests, and medications will improve  Outcome: PROGRESSING AS EXPECTED  Goal: Knowledge of the prescribed therapeutic regimen will improve  Outcome: PROGRESSING AS EXPECTED     Problem: Discharge Barriers/Planning  Goal: Patient's continuum of care needs will be met  Outcome: PROGRESSING AS EXPECTED     Problem: Respiratory:  Goal: Respiratory status will improve  Outcome: PROGRESSING AS EXPECTED     Problem: Pain Management  Goal: Pain level will decrease to patient's comfort goal  Outcome: PROGRESSING AS EXPECTED     Problem: Skin Integrity  Goal: Risk for impaired skin integrity will decrease  Outcome: PROGRESSING AS EXPECTED     Problem: Fluid Volume:  Goal: Will maintain balanced intake and output  Outcome: PROGRESSING AS EXPECTED     Problem: Mobility  Goal: Risk for activity intolerance will decrease  Outcome: PROGRESSING AS EXPECTED

## 2021-03-30 NOTE — ANESTHESIA TIME REPORT
Anesthesia Start and Stop Event Times     Date Time Event    3/29/2021 1311 Ready for Procedure     1343 Anesthesia Start     1700 Anesthesia Stop        Responsible Staff  03/29/21    Name Role Begin End    Calixto Doherty M.D. Anesth 1343 1700        Preop Diagnosis (Free Text):  Pre-op Diagnosis     CERVICAL STENOSIS, C6 VERTEBRAL FRACTURE WITH MYELOPATHY, LINA B SPINAL CORD INJURY        Preop Diagnosis (Codes):    Post op Diagnosis  Cervical stenosis of spine      Premium Reason  A. 3PM - 7AM    Comments:

## 2021-03-30 NOTE — OR NURSING
Pt AA/Ox4. VSS. Dressing to posterior neck, CDI. Pt denies pain at this time. Pt was only able to move his left and right arm. No movements with both lower extremities. Pt reports tingling to his bilateral arms. Pt reports numbness to his bilateral lower extremities.     Intact aspen collar to be worn at all times. Intact hemovac to posterior neck with no drainage in collection disc. Intact temp lewis catheter. SCDs in place.    Report given to Wanda Hernandez RN at SICU.    Pt's mother, Sariah, updated regarding plan of care.    Pt via bed, accompanied by ACLS RN and CNA, was transferred to [facility] at 1756.    Pt transferred with cardiac monitor. Oxygen tank was more than 3/4 full when Pt left PACU.

## 2021-03-30 NOTE — RESPIRATORY CARE
Respiratory Update     Treatment modality: PEP  Frequency: Q4     IS:[id number]     Pt on 2L NC will continue to titrate as tolerated.     Pt tolerating current treatments well with no adverse reactions.

## 2021-03-30 NOTE — DISCHARGE PLANNING
Care Transition Team Assessment    Spoke with patient's mothers Sariah and Josephine. They reported that the patient was completely independent with all ADLs and IADLs prior to this admission. Sariah and Josephine would like for the patient to go to Rehab in [facility] and then come live with them in [place]. Sariah is a teacher and Josephine does not work and will be able to provide care for the patient.    Choice for [facility] obtained and faxed to Siomara DEL RIO at [phone].    Information Source  Orientation : Oriented x 4         Elopement Risk  Legal Hold: No  Ambulatory or Self Mobile in Wheelchair: No-Not an Elopement Risk  Elopement Risk: Not at Risk for Elopement    Interdisciplinary Discharge Planning  Patient or legal guardian wants to designate a caregiver: No  Support Systems: Parent  Prior Services: None    Discharge Preparedness  What is your plan after discharge?: Other (comment)(Acute rehab)  What are your discharge supports?: Parent         Finances  Financial Barriers to Discharge: No  Prescription Coverage: Yes    Vision / Hearing Impairment  Vision Impairment : Yes  Hearing Impairment : No         Advance Directive  Advance Directive?: None    Domestic Abuse  Have you ever been the victim of abuse or violence?: No  Physical Abuse or Sexual Abuse: No  Verbal Abuse or Emotional Abuse: No  Possible Abuse/Neglect Reported to:: Not Applicable    Psychological Assessment  History of Substance Abuse: None         Anticipated Discharge Information  Discharge Disposition: Still a Patient (30)
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
## 2021-03-31 ENCOUNTER — APPOINTMENT (OUTPATIENT)
Dept: RADIOLOGY | Facility: MEDICAL CENTER | Age: 26
DRG: 028 | End: 2021-03-31
Attending: SURGERY
Payer: COMMERCIAL

## 2021-03-31 LAB
ALBUMIN SERPL BCP-MCNC: 3.5 G/DL (ref 3.2–4.9)
ALBUMIN/GLOB SERPL: 1.3 G/DL
ALP SERPL-CCNC: 62 U/L (ref 30–99)
ALT SERPL-CCNC: 13 U/L (ref 2–50)
ANION GAP SERPL CALC-SCNC: 9 MMOL/L (ref 7–16)
AST SERPL-CCNC: 20 U/L (ref 12–45)
BASOPHILS # BLD AUTO: 0.3 % (ref 0–1.8)
BASOPHILS # BLD: 0.04 K/UL (ref 0–0.12)
BILIRUB SERPL-MCNC: 0.6 MG/DL (ref 0.1–1.5)
BUN SERPL-MCNC: 8 MG/DL (ref 8–22)
CALCIUM SERPL-MCNC: 8.3 MG/DL (ref 8.5–10.5)
CHLORIDE SERPL-SCNC: 104 MMOL/L (ref 96–112)
CO2 SERPL-SCNC: 26 MMOL/L (ref 20–33)
CREAT SERPL-MCNC: 0.51 MG/DL (ref 0.5–1.4)
EOSINOPHIL # BLD AUTO: 0.03 K/UL (ref 0–0.51)
EOSINOPHIL NFR BLD: 0.2 % (ref 0–6.9)
ERYTHROCYTE [DISTWIDTH] IN BLOOD BY AUTOMATED COUNT: 42.5 FL (ref 35.9–50)
GLOBULIN SER CALC-MCNC: 2.6 G/DL (ref 1.9–3.5)
GLUCOSE SERPL-MCNC: 110 MG/DL (ref 65–99)
HCT VFR BLD AUTO: 33.1 % (ref 42–52)
HGB BLD-MCNC: 11.3 G/DL (ref 14–18)
IMM GRANULOCYTES # BLD AUTO: 0.04 K/UL (ref 0–0.11)
IMM GRANULOCYTES NFR BLD AUTO: 0.3 % (ref 0–0.9)
LYMPHOCYTES # BLD AUTO: 2.18 K/UL (ref 1–4.8)
LYMPHOCYTES NFR BLD: 16.5 % (ref 22–41)
MAGNESIUM SERPL-MCNC: 1.8 MG/DL (ref 1.5–2.5)
MCH RBC QN AUTO: 30.6 PG (ref 27–33)
MCHC RBC AUTO-ENTMCNC: 34.1 G/DL (ref 33.7–35.3)
MCV RBC AUTO: 89.7 FL (ref 81.4–97.8)
MONOCYTES # BLD AUTO: 1.38 K/UL (ref 0–0.85)
MONOCYTES NFR BLD AUTO: 10.5 % (ref 0–13.4)
NEUTROPHILS # BLD AUTO: 9.53 K/UL (ref 1.82–7.42)
NEUTROPHILS NFR BLD: 72.2 % (ref 44–72)
NRBC # BLD AUTO: 0 K/UL
NRBC BLD-RTO: 0 /100 WBC
PHOSPHATE SERPL-MCNC: 1.8 MG/DL (ref 2.5–4.5)
PLATELET # BLD AUTO: 219 K/UL (ref 164–446)
PMV BLD AUTO: 9.6 FL (ref 9–12.9)
POTASSIUM SERPL-SCNC: 4.1 MMOL/L (ref 3.6–5.5)
PROT SERPL-MCNC: 6.1 G/DL (ref 6–8.2)
RBC # BLD AUTO: 3.69 M/UL (ref 4.7–6.1)
SODIUM SERPL-SCNC: 139 MMOL/L (ref 135–145)
WBC # BLD AUTO: 13.2 K/UL (ref 4.8–10.8)

## 2021-03-31 PROCEDURE — 302132 K THERMIA MOTOR: Performed by: SURGERY

## 2021-03-31 PROCEDURE — 770022 HCHG ROOM/CARE - ICU (200)

## 2021-03-31 PROCEDURE — 80053 COMPREHEN METABOLIC PANEL: CPT

## 2021-03-31 PROCEDURE — 700102 HCHG RX REV CODE 250 W/ 637 OVERRIDE(OP): Performed by: PHYSICAL MEDICINE & REHABILITATION

## 2021-03-31 PROCEDURE — 700105 HCHG RX REV CODE 258: Performed by: SURGERY

## 2021-03-31 PROCEDURE — 700102 HCHG RX REV CODE 250 W/ 637 OVERRIDE(OP): Performed by: SURGERY

## 2021-03-31 PROCEDURE — 83735 ASSAY OF MAGNESIUM: CPT

## 2021-03-31 PROCEDURE — A9270 NON-COVERED ITEM OR SERVICE: HCPCS | Performed by: SURGERY

## 2021-03-31 PROCEDURE — 99291 CRITICAL CARE FIRST HOUR: CPT | Performed by: SURGERY

## 2021-03-31 PROCEDURE — A9270 NON-COVERED ITEM OR SERVICE: HCPCS | Performed by: PHYSICIAN ASSISTANT

## 2021-03-31 PROCEDURE — 700111 HCHG RX REV CODE 636 W/ 250 OVERRIDE (IP): Performed by: SURGERY

## 2021-03-31 PROCEDURE — 700102 HCHG RX REV CODE 250 W/ 637 OVERRIDE(OP): Performed by: PHYSICIAN ASSISTANT

## 2021-03-31 PROCEDURE — 85025 COMPLETE CBC W/AUTO DIFF WBC: CPT

## 2021-03-31 PROCEDURE — 99233 SBSQ HOSP IP/OBS HIGH 50: CPT | Performed by: PHYSICAL MEDICINE & REHABILITATION

## 2021-03-31 PROCEDURE — 700111 HCHG RX REV CODE 636 W/ 250 OVERRIDE (IP): Performed by: PHYSICIAN ASSISTANT

## 2021-03-31 PROCEDURE — 71045 X-RAY EXAM CHEST 1 VIEW: CPT

## 2021-03-31 PROCEDURE — 94669 MECHANICAL CHEST WALL OSCILL: CPT

## 2021-03-31 PROCEDURE — 700101 HCHG RX REV CODE 250: Performed by: NEUROLOGICAL SURGERY

## 2021-03-31 PROCEDURE — A9270 NON-COVERED ITEM OR SERVICE: HCPCS | Performed by: PHYSICAL MEDICINE & REHABILITATION

## 2021-03-31 PROCEDURE — 84100 ASSAY OF PHOSPHORUS: CPT

## 2021-03-31 PROCEDURE — 700101 HCHG RX REV CODE 250: Performed by: SURGERY

## 2021-03-31 RX ORDER — GABAPENTIN 300 MG/1
600 CAPSULE ORAL 3 TIMES DAILY
Status: DISCONTINUED | OUTPATIENT
Start: 2021-03-31 | End: 2021-04-01

## 2021-03-31 RX ORDER — PHENYLEPHRINE HCL 10 MG/1
10 TABLET, FILM COATED ORAL 3 TIMES DAILY
Status: DISCONTINUED | OUTPATIENT
Start: 2021-03-31 | End: 2021-04-07

## 2021-03-31 RX ADMIN — OXYCODONE 5 MG: 5 TABLET ORAL at 10:01

## 2021-03-31 RX ADMIN — BACLOFEN 10 MG: 10 TABLET ORAL at 12:55

## 2021-03-31 RX ADMIN — GUAIFENESIN 600 MG: 600 TABLET, EXTENDED RELEASE ORAL at 05:14

## 2021-03-31 RX ADMIN — BACLOFEN 10 MG: 10 TABLET ORAL at 17:07

## 2021-03-31 RX ADMIN — FAMOTIDINE 20 MG: 20 TABLET ORAL at 05:14

## 2021-03-31 RX ADMIN — PHENYLEPHRINE HCL 10 MG: 10 TABLET, FILM COATED ORAL at 12:55

## 2021-03-31 RX ADMIN — SODIUM CHLORIDE: 9 INJECTION, SOLUTION INTRAVENOUS at 19:41

## 2021-03-31 RX ADMIN — FAMOTIDINE 20 MG: 20 TABLET ORAL at 17:07

## 2021-03-31 RX ADMIN — DOCUSATE SODIUM 50 MG AND SENNOSIDES 8.6 MG 1 TABLET: 8.6; 5 TABLET, FILM COATED ORAL at 20:08

## 2021-03-31 RX ADMIN — CALCIUM CARBONATE 500 MG: 500 TABLET, CHEWABLE ORAL at 05:14

## 2021-03-31 RX ADMIN — DOCUSATE SODIUM 100 MG: 100 CAPSULE, LIQUID FILLED ORAL at 17:11

## 2021-03-31 RX ADMIN — NOREPINEPHRINE BITARTRATE 6 MCG/MIN: 1 INJECTION INTRAVENOUS at 10:00

## 2021-03-31 RX ADMIN — SODIUM PHOSPHATE, MONOBASIC, MONOHYDRATE AND SODIUM PHOSPHATE, DIBASIC, ANHYDROUS 30 MMOL: 276; 142 INJECTION, SOLUTION INTRAVENOUS at 10:00

## 2021-03-31 RX ADMIN — SODIUM CHLORIDE: 9 INJECTION, SOLUTION INTRAVENOUS at 03:30

## 2021-03-31 RX ADMIN — GABAPENTIN 600 MG: 300 CAPSULE ORAL at 17:07

## 2021-03-31 RX ADMIN — DOCUSATE SODIUM 100 MG: 100 CAPSULE, LIQUID FILLED ORAL at 05:14

## 2021-03-31 RX ADMIN — OXYCODONE HYDROCHLORIDE 10 MG: 10 TABLET ORAL at 20:08

## 2021-03-31 RX ADMIN — MAGNESIUM HYDROXIDE 30 ML: 400 SUSPENSION ORAL at 05:14

## 2021-03-31 RX ADMIN — MIDODRINE HYDROCHLORIDE 10 MG: 5 TABLET ORAL at 13:50

## 2021-03-31 RX ADMIN — POLYETHYLENE GLYCOL 3350 1 PACKET: 17 POWDER, FOR SOLUTION ORAL at 05:15

## 2021-03-31 RX ADMIN — ACETAMINOPHEN 1000 MG: 500 TABLET, FILM COATED ORAL at 17:07

## 2021-03-31 RX ADMIN — ACETAMINOPHEN 1000 MG: 500 TABLET, FILM COATED ORAL at 23:35

## 2021-03-31 RX ADMIN — MIDODRINE HYDROCHLORIDE 10 MG: 5 TABLET ORAL at 05:14

## 2021-03-31 RX ADMIN — POLYETHYLENE GLYCOL 3350 1 PACKET: 17 POWDER, FOR SOLUTION ORAL at 17:07

## 2021-03-31 RX ADMIN — ACETAMINOPHEN 1000 MG: 500 TABLET, FILM COATED ORAL at 05:14

## 2021-03-31 RX ADMIN — GABAPENTIN 600 MG: 300 CAPSULE ORAL at 12:54

## 2021-03-31 RX ADMIN — Medication 5000 UNITS: at 05:14

## 2021-03-31 RX ADMIN — BACLOFEN 10 MG: 10 TABLET ORAL at 05:14

## 2021-03-31 RX ADMIN — CALCIUM CARBONATE 500 MG: 500 TABLET, CHEWABLE ORAL at 17:07

## 2021-03-31 RX ADMIN — MIDODRINE HYDROCHLORIDE 10 MG: 5 TABLET ORAL at 22:38

## 2021-03-31 RX ADMIN — GABAPENTIN 400 MG: 400 CAPSULE ORAL at 05:14

## 2021-03-31 RX ADMIN — OXYCODONE HYDROCHLORIDE 10 MG: 10 TABLET ORAL at 13:50

## 2021-03-31 RX ADMIN — OXYCODONE HYDROCHLORIDE 10 MG: 10 TABLET ORAL at 03:35

## 2021-03-31 RX ADMIN — ENOXAPARIN SODIUM 40 MG: 40 INJECTION SUBCUTANEOUS at 17:07

## 2021-03-31 RX ADMIN — GUAIFENESIN 600 MG: 600 TABLET, EXTENDED RELEASE ORAL at 17:11

## 2021-03-31 RX ADMIN — HYDROMORPHONE HYDROCHLORIDE 0.5 MG: 1 INJECTION, SOLUTION INTRAMUSCULAR; INTRAVENOUS; SUBCUTANEOUS at 21:04

## 2021-03-31 RX ADMIN — METHOCARBAMOL 750 MG: 750 TABLET ORAL at 23:48

## 2021-03-31 RX ADMIN — OXYCODONE 5 MG: 5 TABLET ORAL at 17:08

## 2021-03-31 RX ADMIN — ACETAMINOPHEN 1000 MG: 500 TABLET, FILM COATED ORAL at 12:54

## 2021-03-31 RX ADMIN — PHENYLEPHRINE HCL 10 MG: 10 TABLET, FILM COATED ORAL at 17:07

## 2021-03-31 ASSESSMENT — PAIN DESCRIPTION - PAIN TYPE
TYPE: ACUTE PAIN

## 2021-03-31 ASSESSMENT — FIBROSIS 4 INDEX: FIB4 SCORE: 0.58

## 2021-03-31 NOTE — CARE PLAN
Problem: Safety  Goal: Will remain free from injury  Outcome: PROGRESSING AS EXPECTED     Problem: Infection  Goal: Will remain free from infection  Outcome: PROGRESSING AS EXPECTED     Problem: Respiratory:  Goal: Respiratory status will improve  Outcome: PROGRESSING AS EXPECTED     Problem: Pain Management  Goal: Pain level will decrease to patient's comfort goal  Outcome: PROGRESSING AS EXPECTED     Problem: Mobility  Goal: Risk for activity intolerance will decrease  Outcome: PROGRESSING AS EXPECTED

## 2021-03-31 NOTE — RESPIRATORY CARE
Respiratory Update     Treatment modality: PEP  Frequency: Q4     IS:[id number]     Pt on Room Air    Pt tolerating current treatments well with no adverse reactions.
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
## 2021-03-31 NOTE — PROGRESS NOTES
Neurosurgery Progress Note    Subjective:  POD#2 C4 - C7 laminectomy with fusion for C5 LINA B SCI.   C/O pain at op site.  Feels as though he has a little better motion of the upper ext. Sensation is unchanged.  Drain was discontinued yesterday.   Looking in to getting rehab acceptance.     Exam:  2/5 bilateral deltoid  2+3/5 bilateral biceps and triceps  Minimal , right greater than left.   0/5 to lowers  Light sensation intact to lowers. Cannot localize well.    BP  Min: 111/56  Max: 168/70  Pulse  Av.3  Min: 54  Max: 99  Resp  Av.5  Min: 6  Max: 67  Monitored Temp 2  Av.7 °C (99.8 °F)  Min: 37.1 °C (98.8 °F)  Max: 38.7 °C (101.7 °F)  SpO2  Av.6 %  Min: 91 %  Max: 99 %    No data recorded    Recent Labs     21  0400 21  0400 21  1852 21  0521  0502   WBC 24.0*  --   --  15.0* 13.2*   RBC 4.55*  --   --  3.90* 3.69*   HEMOGLOBIN 13.8*   < > 13.4* 11.8* 11.3*   HEMATOCRIT 39.8*  --   --  33.8* 33.1*   MCV 87.5  --   --  86.7 89.7   MCH 30.3  --   --  30.3 30.6   MCHC 34.7  --   --  34.9 34.1   RDW 41.4  --   --  41.1 42.5   PLATELETCT 323  --   --  245 219   MPV 9.9  --   --  9.3 9.6    < > = values in this interval not displayed.     Recent Labs     21  0400 21  0521  0502   SODIUM 137 138 139   POTASSIUM 3.9 4.1 4.1   CHLORIDE 104 106 104   CO2 20 23 26   GLUCOSE 107* 107* 110*   BUN 15 10 8   CREATININE 0.66 0.52 0.51   CALCIUM 9.0 8.0* 8.3*     Recent Labs     21   APTT 24.8   INR 1.06     Recent Labs     21  2158   REACTMIN 3.8*   CLOTKINET 2.2   CLOTANGL 62.6   MAXCLOTS 60.0   OTB73VMR 0.2   PRCINADP 0.0   PRCINAA 0.0       Intake/Output       21 0700 - 21 0659 21 0700 - 21 0659      4903-1616 5993-4218 Total 8549-1854 4934-7742 Total       Intake    P.O.  --  950 950  --  -- --    P.O. -- 950 950 -- -- --    I.V.  --  2026.6 2026.6  --  -- --    Norepinephrine Volume -- 226.6 226.6 -- -- --     Volume (mL) (NS infusion) -- 1800 1800 -- -- --    Total Intake -- 2976.6 2976.6 -- -- --       Output    Urine  2525  3250 5775  300  -- 300    Output (mL) (Urethral Catheter Temperature probe) 2525 3250 5775 300 -- 300    Drains  --  55 55  --  -- --    Output (mL) ([REMOVED] Closed/Suction Drain 1 Posterior Neck Hemovac ) -- 55 55 -- -- --    Stool  --  -- --  --  -- --    Number of Times Stooled -- 0 x 0 x -- -- --    Total Output 2525 3305 5830 300 -- 300       Net I/O     -2525 -328.5 -2853.5 -300 -- -300            Intake/Output Summary (Last 24 hours) at 3/31/2021 0805  Last data filed at 3/31/2021 0700  Gross per 24 hour   Intake 2976.55 ml   Output 6130 ml   Net -3153.45 ml            • midodrine  10 mg Q8HRS   • gabapentin  400 mg TID   • guaiFENesin ER  600 mg Q12HRS   • baclofen  10 mg TID   • Pharmacy Consult Request  1 Each PHARMACY TO DOSE   • MD ALERT...DO NOT ADMINISTER NSAIDS or ASPIRIN unless ORDERED By Neurosurgery  1 Each PRN   • docusate sodium  100 mg BID   • senna-docusate  1 tablet Nightly   • senna-docusate  1 tablet Q24HRS PRN   • polyethylene glycol/lytes  1 Packet BID PRN   • magnesium hydroxide  30 mL QDAY PRN   • bisacodyl  10 mg Q24HRS PRN   • fleet  1 Each Once PRN   • NS   Continuous   • enoxaparin  40 mg DAILY AT 1800   • acetaminophen  1,000 mg Q6HRS    Followed by   • [START ON 4/3/2021] acetaminophen  1,000 mg Q6HRS PRN   • ondansetron  4 mg Q4HRS PRN   • ondansetron  4 mg Q4HRS PRN   • promethazine  12.5-25 mg Q4HRS PRN   • promethazine  12.5-25 mg Q4HRS PRN   • prochlorperazine  5-10 mg Q4HRS PRN   • methocarbamol  750 mg Q8HRS PRN   • labetalol  10 mg Q HOUR PRN   • hydrALAZINE  10 mg Q HOUR PRN   • benzocaine-menthol  1 Lozenge Q2HRS PRN   • calcium carbonate  500 mg BID   • vitamin D  5,000 Units DAILY   • HYDROmorphone  0.5-1 mg Q HOUR PRN    Or   • oxyCODONE immediate-release  5 mg Q3HRS PRN    Or   • oxyCODONE immediate-release  10 mg Q3HRS PRN   • Respiratory  Therapy Consult   Continuous RT   • polyethylene glycol/lytes  1 Packet BID   • magnesium hydroxide  30 mL DAILY   • famotidine  20 mg BID    Or   • famotidine  20 mg BID   • norepinephrine (Levophed) infusion  0-30 mcg/min Continuous       Assessment and Plan:  Hospital day #3  POD #2 C4 - C7 laminectomy and fusion  OK to mobilize as able with collar  MAPs >80 for 5 days more.  Ok for q2 neuro checks.   Will need rehab.   Prophylactic anticoagulation: yes         Start date/time: now

## 2021-03-31 NOTE — DISCHARGE PLANNING
Spoke with Sariah, mother regarding [facility] and D/C resources/support.  We discussed that there is a possibility of insurance authorizing transport to an acute rehab in [place] closer to home as well as having the wealth from continuity by having acute rehab @ [facility].  She is aware that I have submitted to insurance.  Enrique's goal is to return to his home prior situation with his roommate after rehab.  Sariah states that he will be staying with her and her spouse and twins that 21yo in [place].  Sariah works full time as a Teacher.  Twins work full time as well.  Josephine, spouse does not work and will be his primary caregiver.  Plan is for Josephine to stay in [place] and be @ [facility] daily for caregiver training-pending finances hold. Their home is a 2LV home with a ramp and a step to enter.  Josephine is looking into removing the old ramp and having a larger ramp installed.  Home is w/c accessible otherwise.      1429-Per JUDIE Alcaraz Enrique does not have any coverage outside of [place] with the exception of emergency services.  Therefore, Enrique will need post acute services in [place].  Sariah, Mother is aware and is disappointed as she was hoping he would stay with [facility].  She understands.  Msg left for Cee BAILEY.  TCC will no longer follow.  Please reach out to myself @ [phone] with any questions.
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
## 2021-03-31 NOTE — CARE PLAN
Problem: Safety  Goal: Will remain free from injury  Outcome: PROGRESSING AS EXPECTED   Patient continues to remain free from injury.  Problem: Fluid Volume:  Goal: Will maintain balanced intake and output  Outcome: PROGRESSING AS EXPECTED   Patient continues to maintain adequate intake and output.

## 2021-03-31 NOTE — PROGRESS NOTES
2 RN skin check completed with Leticia.   Devices in place nasal cannula, C-collar, ECG leads, lewis, statlock, SCD sleeves, heel float boots, pulse ox, R radial art line, R subclavian central line, 1 PIV BP cuff, hemovac cervical posterior  Skin assessed under devices yes.  Confirmed pressure ulcers found on none.  New potential pressure ulcers noted on none. Wound consult placed no.  The following interventions in place q2h turns, repositioning extremities q2h, heel float boots, mepilex to sacrum.    Surgical incision to posterior cervical has dressing in place that is clean, dry, and intact.  Avulsion wound to top of head has dermabond. Scabbed and open to air  Sacrum is pink and blanching

## 2021-03-31 NOTE — PROGRESS NOTES
2 RN skin check completed with Leticia.   Devices in place nasal cannula, C-collar, ECG leads, lewis, statlock, SCD sleeves, heel float boots, pulse ox, R radial art line, R subclavian central line, 1 PIV BP cuff, hemovac cervical posterior  Skin assessed under devices yes.  Confirmed pressure ulcers found on none.  New potential pressure ulcers noted on none. Wound consult placed no.  The following interventions in place q2h turns, repositioning extremities q2h, heel float boots, mepilex to sacrum, TAPS in place.     Surgical incision to posterior cervical has dressing in place that is clean, dry, and intact.  Avulsion wound to top of head has dermabond. Scabbed and open to air  Sacrum is pink and slow to teresa.

## 2021-03-31 NOTE — CARE PLAN
Problem: Hyperinflation:  Goal: Prevent or improve atelectasis  Outcome: PROGRESSING SLOWER THAN EXPECTED   Pep q4, IS [id number], good effort, fair cough, quad cough education given, flutter given as well.  Clear cxr at this time

## 2021-03-31 NOTE — DISCHARGE PLANNING
Patient's insurance only covers services in [place] with the exception of emergency services. Mark Anthony with [facility] informed Sariah. LSW met with Sariah and discussed this. Sariah is sadden by the news but is eager to look into other options. LSW provided Sariah with [facility]'s brochure packet plus additional [place] rehab programs. All questions answered.     Plan:   - Family to research [place] rehabs and inform LSW of choice.

## 2021-03-31 NOTE — DISCHARGE PLANNING
"LSW met with patient and mom, Sariah Blackmon [phone], at bedside with Peg on speaker phone. We discussed dc options, goals, and current & future planning.     Sariah mentioned that they sent a referral to [facility] after \"hearing good things about their program\" however she later stated, \"someone from PT or maybe it was from physiatry mentioned [facility].\" LSW educated her and pt on [facility] and even provided their brochure packet for further education on their program. Sariah reports that they felt obligated to choose [facility] and that no one talked to them about additional SCI rehab options or informed them that they had a choice. Sincere apologizes were made. LSW then spent time discussing the transferring/dc process and encouraged them to research SCI rehabs that overall would work for pt and their family. Encouraged them to contact their insurance company to determine in network facilities and to also determine possible transportation coverage/benefits if they are wanting to consider a further SCI rehab. Brief education on transportation given and answered all questions.     Discussed advance directives/guardianship. Sariah is aware that the AD IP is only for medical decisions. LSW educated them on temp guardianship to help with pt's finances: car, rent, taxes, etc. Resources for temp guardianship given. Patient is agreeable with completing an AD for medical decisions right now while his mom's pursue temp guardianship. AD packet given with detailed education. RUPALI signed by Dr. Mccabe. Pending AD completion to schedule notary. Patient is aware he will need to sign AD using his mouth. Sariah states she will practice with him.     Sariah and Peg report they want to apply patient for social security disablity. LSW educated them on that process and that they'll need a financial POA or guardianship to work on pt's behalf for those benefits. Sariah reports they're looking into \"at " "home care\" and a \"disabled van\" as well. They're aware that for most programs/services patient will have to be deemed disabled by SS first. They're aware to start that process is obtaining guardianship or financial POA.     Sariah and Peg are working with pt's apartment complex to terminate his lease agreement within the next 10 days. They do not want to seek legal action on the complex, and they shared that the  has been working with them on terminating that lease with no charge. No concerns for LSW to assist with at this time. Plan to move pt's belongings out of apt into a cousins home, who lives in [place], until they're able to transfer belongings back home.     After rehab pt's plan is to return home with family in [place]. Sariah reports their home is not \"fully\" ADA compliant for a disabled individual \"who are mostly independent.\" Sariah explained that if patient needed to be pushed in a WC, their home is appropriate but it lacks the space needed if patient was independent and needed to have room to move around in his WC. Family is actively thinking of additional options for patient.     [facility] mentor arrived to bedside and spent time talking with patient and Sariah.     Sariah then asked LSW about mental health resources. LSW provided resources and education on the 5 stages of grief. Patient is currently in denial about his injury. Emotional support given to patient and family.     LSW also provided Sariah with a LEONELA per her request. LSW explained [facility]'s medical records policy and process to releasing information. Patient to sign LEONELA using his mouth. He is agreeable with obtaining his records and letting his mom's have access.     Sariah is the breadwinner of the household as a teacher. She's currently on spring break and is planning on returning. Discussed FMLA options. Sariah to contact her HR department.     Overall, patient has great family support. Family is very involved " with care & are actively working on current & future planning. Patient and family are asking appropriate questions regarding his dc plan and have been provided resources to assist them during this process.     Plan:   - Pt to complete AD. RUPALI signed   - Confirm rehab placement. Referral sent to [facility] however pt/family are thinking of other options as they were not given proper choice. Once choice has been obtain; coordinate that transfer.    - Assist with temp guardianship if needs arise  - Continue to provide SCI resources & support
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
## 2021-04-01 ENCOUNTER — APPOINTMENT (OUTPATIENT)
Dept: RADIOLOGY | Facility: MEDICAL CENTER | Age: 26
DRG: 028 | End: 2021-04-01
Attending: SURGERY
Payer: COMMERCIAL

## 2021-04-01 LAB
ALBUMIN SERPL BCP-MCNC: 3.5 G/DL (ref 3.2–4.9)
ALBUMIN/GLOB SERPL: 1.3 G/DL
ALP SERPL-CCNC: 63 U/L (ref 30–99)
ALT SERPL-CCNC: 13 U/L (ref 2–50)
ANION GAP SERPL CALC-SCNC: 8 MMOL/L (ref 7–16)
AST SERPL-CCNC: 17 U/L (ref 12–45)
BASOPHILS # BLD AUTO: 0.3 % (ref 0–1.8)
BASOPHILS # BLD: 0.03 K/UL (ref 0–0.12)
BILIRUB SERPL-MCNC: 0.3 MG/DL (ref 0.1–1.5)
BUN SERPL-MCNC: 17 MG/DL (ref 8–22)
CALCIUM SERPL-MCNC: 8.8 MG/DL (ref 8.5–10.5)
CHLORIDE SERPL-SCNC: 101 MMOL/L (ref 96–112)
CO2 SERPL-SCNC: 27 MMOL/L (ref 20–33)
CREAT SERPL-MCNC: 0.63 MG/DL (ref 0.5–1.4)
EOSINOPHIL # BLD AUTO: 0.08 K/UL (ref 0–0.51)
EOSINOPHIL NFR BLD: 0.8 % (ref 0–6.9)
ERYTHROCYTE [DISTWIDTH] IN BLOOD BY AUTOMATED COUNT: 42.1 FL (ref 35.9–50)
GLOBULIN SER CALC-MCNC: 2.8 G/DL (ref 1.9–3.5)
GLUCOSE SERPL-MCNC: 112 MG/DL (ref 65–99)
HCT VFR BLD AUTO: 34.3 % (ref 42–52)
HGB BLD-MCNC: 11.4 G/DL (ref 14–18)
IMM GRANULOCYTES # BLD AUTO: 0.03 K/UL (ref 0–0.11)
IMM GRANULOCYTES NFR BLD AUTO: 0.3 % (ref 0–0.9)
LYMPHOCYTES # BLD AUTO: 2.61 K/UL (ref 1–4.8)
LYMPHOCYTES NFR BLD: 25.2 % (ref 22–41)
MAGNESIUM SERPL-MCNC: 1.9 MG/DL (ref 1.5–2.5)
MCH RBC QN AUTO: 30 PG (ref 27–33)
MCHC RBC AUTO-ENTMCNC: 33.2 G/DL (ref 33.7–35.3)
MCV RBC AUTO: 90.3 FL (ref 81.4–97.8)
MONOCYTES # BLD AUTO: 1.27 K/UL (ref 0–0.85)
MONOCYTES NFR BLD AUTO: 12.3 % (ref 0–13.4)
NEUTROPHILS # BLD AUTO: 6.32 K/UL (ref 1.82–7.42)
NEUTROPHILS NFR BLD: 61.1 % (ref 44–72)
NRBC # BLD AUTO: 0 K/UL
NRBC BLD-RTO: 0 /100 WBC
PHOSPHATE SERPL-MCNC: 3.9 MG/DL (ref 2.5–4.5)
PLATELET # BLD AUTO: 227 K/UL (ref 164–446)
PMV BLD AUTO: 9.5 FL (ref 9–12.9)
POTASSIUM SERPL-SCNC: 4 MMOL/L (ref 3.6–5.5)
PROT SERPL-MCNC: 6.3 G/DL (ref 6–8.2)
RBC # BLD AUTO: 3.8 M/UL (ref 4.7–6.1)
SODIUM SERPL-SCNC: 136 MMOL/L (ref 135–145)
WBC # BLD AUTO: 10.3 K/UL (ref 4.8–10.8)

## 2021-04-01 PROCEDURE — 71045 X-RAY EXAM CHEST 1 VIEW: CPT

## 2021-04-01 PROCEDURE — L4398 FOOT DROP SPLINT PRE OTS: HCPCS

## 2021-04-01 PROCEDURE — A9270 NON-COVERED ITEM OR SERVICE: HCPCS | Performed by: SURGERY

## 2021-04-01 PROCEDURE — 94669 MECHANICAL CHEST WALL OSCILL: CPT

## 2021-04-01 PROCEDURE — 700101 HCHG RX REV CODE 250: Performed by: NEUROLOGICAL SURGERY

## 2021-04-01 PROCEDURE — A9270 NON-COVERED ITEM OR SERVICE: HCPCS | Performed by: PHYSICAL MEDICINE & REHABILITATION

## 2021-04-01 PROCEDURE — 80053 COMPREHEN METABOLIC PANEL: CPT

## 2021-04-01 PROCEDURE — 700102 HCHG RX REV CODE 250 W/ 637 OVERRIDE(OP): Performed by: PHYSICAL MEDICINE & REHABILITATION

## 2021-04-01 PROCEDURE — 700102 HCHG RX REV CODE 250 W/ 637 OVERRIDE(OP): Performed by: SURGERY

## 2021-04-01 PROCEDURE — A9270 NON-COVERED ITEM OR SERVICE: HCPCS | Performed by: PHYSICIAN ASSISTANT

## 2021-04-01 PROCEDURE — 99291 CRITICAL CARE FIRST HOUR: CPT | Performed by: SURGERY

## 2021-04-01 PROCEDURE — 84100 ASSAY OF PHOSPHORUS: CPT

## 2021-04-01 PROCEDURE — 99233 SBSQ HOSP IP/OBS HIGH 50: CPT | Performed by: PHYSICAL MEDICINE & REHABILITATION

## 2021-04-01 PROCEDURE — 83735 ASSAY OF MAGNESIUM: CPT

## 2021-04-01 PROCEDURE — 700111 HCHG RX REV CODE 636 W/ 250 OVERRIDE (IP): Performed by: SURGERY

## 2021-04-01 PROCEDURE — 700102 HCHG RX REV CODE 250 W/ 637 OVERRIDE(OP): Performed by: PHYSICIAN ASSISTANT

## 2021-04-01 PROCEDURE — 770022 HCHG ROOM/CARE - ICU (200)

## 2021-04-01 PROCEDURE — 85025 COMPLETE CBC W/AUTO DIFF WBC: CPT

## 2021-04-01 RX ORDER — GABAPENTIN 300 MG/1
600 CAPSULE ORAL 4 TIMES DAILY
Status: DISCONTINUED | OUTPATIENT
Start: 2021-04-01 | End: 2021-04-13

## 2021-04-01 RX ADMIN — PHENYLEPHRINE HCL 10 MG: 10 TABLET, FILM COATED ORAL at 11:23

## 2021-04-01 RX ADMIN — ACETAMINOPHEN 1000 MG: 500 TABLET, FILM COATED ORAL at 18:10

## 2021-04-01 RX ADMIN — FAMOTIDINE 20 MG: 20 TABLET ORAL at 05:19

## 2021-04-01 RX ADMIN — MINERAL OIL, PETROLATUM 1 APPLICATION: 425; 573 OINTMENT OPHTHALMIC at 16:41

## 2021-04-01 RX ADMIN — GABAPENTIN 600 MG: 300 CAPSULE ORAL at 22:26

## 2021-04-01 RX ADMIN — MAGNESIUM HYDROXIDE 30 ML: 400 SUSPENSION ORAL at 05:19

## 2021-04-01 RX ADMIN — BACLOFEN 10 MG: 10 TABLET ORAL at 05:19

## 2021-04-01 RX ADMIN — NOREPINEPHRINE BITARTRATE 6 MCG/MIN: 1 INJECTION INTRAVENOUS at 05:00

## 2021-04-01 RX ADMIN — ACETAMINOPHEN 1000 MG: 500 TABLET, FILM COATED ORAL at 05:19

## 2021-04-01 RX ADMIN — Medication 5000 UNITS: at 05:19

## 2021-04-01 RX ADMIN — GABAPENTIN 600 MG: 300 CAPSULE ORAL at 05:19

## 2021-04-01 RX ADMIN — GUAIFENESIN 600 MG: 600 TABLET, EXTENDED RELEASE ORAL at 18:09

## 2021-04-01 RX ADMIN — CALCIUM CARBONATE 500 MG: 500 TABLET, CHEWABLE ORAL at 18:10

## 2021-04-01 RX ADMIN — MIDODRINE HYDROCHLORIDE 10 MG: 5 TABLET ORAL at 14:58

## 2021-04-01 RX ADMIN — PHENYLEPHRINE HCL 10 MG: 10 TABLET, FILM COATED ORAL at 18:10

## 2021-04-01 RX ADMIN — OXYCODONE 5 MG: 5 TABLET ORAL at 11:22

## 2021-04-01 RX ADMIN — OXYCODONE 5 MG: 5 TABLET ORAL at 00:53

## 2021-04-01 RX ADMIN — DOCUSATE SODIUM 100 MG: 100 CAPSULE, LIQUID FILLED ORAL at 05:18

## 2021-04-01 RX ADMIN — CALCIUM CARBONATE 500 MG: 500 TABLET, CHEWABLE ORAL at 05:19

## 2021-04-01 RX ADMIN — ENOXAPARIN SODIUM 30 MG: 30 INJECTION SUBCUTANEOUS at 18:10

## 2021-04-01 RX ADMIN — PHENYLEPHRINE HCL 10 MG: 10 TABLET, FILM COATED ORAL at 05:18

## 2021-04-01 RX ADMIN — ACETAMINOPHEN 1000 MG: 500 TABLET, FILM COATED ORAL at 11:23

## 2021-04-01 RX ADMIN — BACLOFEN 10 MG: 10 TABLET ORAL at 11:22

## 2021-04-01 RX ADMIN — GABAPENTIN 600 MG: 300 CAPSULE ORAL at 18:14

## 2021-04-01 RX ADMIN — OXYCODONE HYDROCHLORIDE 10 MG: 10 TABLET ORAL at 14:58

## 2021-04-01 RX ADMIN — MIDODRINE HYDROCHLORIDE 10 MG: 5 TABLET ORAL at 05:19

## 2021-04-01 RX ADMIN — BACLOFEN 10 MG: 10 TABLET ORAL at 18:10

## 2021-04-01 RX ADMIN — OXYCODONE HYDROCHLORIDE 10 MG: 10 TABLET ORAL at 22:26

## 2021-04-01 RX ADMIN — GABAPENTIN 600 MG: 300 CAPSULE ORAL at 11:23

## 2021-04-01 RX ADMIN — MIDODRINE HYDROCHLORIDE 10 MG: 5 TABLET ORAL at 22:26

## 2021-04-01 RX ADMIN — GUAIFENESIN 600 MG: 600 TABLET, EXTENDED RELEASE ORAL at 05:19

## 2021-04-01 RX ADMIN — POLYETHYLENE GLYCOL 3350 1 PACKET: 17 POWDER, FOR SOLUTION ORAL at 05:18

## 2021-04-01 ASSESSMENT — PAIN DESCRIPTION - PAIN TYPE
TYPE: ACUTE PAIN

## 2021-04-01 ASSESSMENT — FIBROSIS 4 INDEX: FIB4 SCORE: 0.48

## 2021-04-01 NOTE — PROGRESS NOTES
Neurosurgery Progress Note    Subjective:  POD#3 C4 - C7 laminectomy with fusion for C5 LINA B SCI.   C/O pain at op site.  No neuro changes  Looking in to getting rehab acceptance.     Exam:  2/5 bilateral deltoid  2+3/5 bilateral biceps. 1/5 triceps bilaterally.  Minimal , right greater than left.   0/5 to lowers  Pressure  sensation intact to lowers. Cannot localize well.    BP  Min: 103/54  Max: 156/65  Pulse  Av.9  Min: 47  Max: 177  Resp  Av.3  Min: 6  Max: 38  Monitored Temp 2  Av.6 °C (101.5 °F)  Min: 37.9 °C (100.2 °F)  Max: 39.2 °C (102.6 °F)  SpO2  Av.5 %  Min: 90 %  Max: 100 %    No data recorded    Recent Labs     21  0530 21  0502 21  0505   WBC 15.0* 13.2* 10.3   RBC 3.90* 3.69* 3.80*   HEMOGLOBIN 11.8* 11.3* 11.4*   HEMATOCRIT 33.8* 33.1* 34.3*   MCV 86.7 89.7 90.3   MCH 30.3 30.6 30.0   MCHC 34.9 34.1 33.2*   RDW 41.1 42.5 42.1   PLATELETCT 245 219 227   MPV 9.3 9.6 9.5     Recent Labs     21  0530 21  0502 21  0505   SODIUM 138 139 136   POTASSIUM 4.1 4.1 4.0   CHLORIDE 106 104 101   CO2 23 26 27   GLUCOSE 107* 110* 112*   BUN 10 8 17   CREATININE 0.52 0.51 0.63   CALCIUM 8.0* 8.3* 8.8               Intake/Output       21 - 21 - 21 Total  Total       Intake    P.O.  300  700 1000  --  -- --    P.O.  -- -- --    I.V.  --  501.3 501.3  --  -- --    Norepinephrine Volume -- 141.3 141.3 -- -- --    Volume (mL) (NS infusion) -- 360 360 -- -- --    Total Intake 300 1201.3 1501.3 -- -- --       Output    Urine  3500  1675 5175  --  -- --    Output (mL) (Urethral Catheter Temperature probe) 3500 1675 5175 -- -- --    Stool  --  -- --  --  -- --    Number of Times Stooled -- 1 x 1 x -- -- --    Total Output 3500 1675 5175 -- -- --       Net I/O     -3200 -473.8 -3673.8 -- -- --            Intake/Output Summary (Last 24 hours) at 2021  0744  Last data filed at 4/1/2021 0600  Gross per 24 hour   Intake 1501.25 ml   Output 4875 ml   Net -3373.75 ml            • gabapentin  600 mg TID   • phenylephrine  10 mg TID   • artificial tears  1 Application PRN   • midodrine  10 mg Q8HRS   • guaiFENesin ER  600 mg Q12HRS   • baclofen  10 mg TID   • Pharmacy Consult Request  1 Each PHARMACY TO DOSE   • MD ALERT...DO NOT ADMINISTER NSAIDS or ASPIRIN unless ORDERED By Neurosurgery  1 Each PRN   • docusate sodium  100 mg BID   • senna-docusate  1 tablet Nightly   • senna-docusate  1 tablet Q24HRS PRN   • polyethylene glycol/lytes  1 Packet BID PRN   • magnesium hydroxide  30 mL QDAY PRN   • bisacodyl  10 mg Q24HRS PRN   • fleet  1 Each Once PRN   • NS   Continuous   • enoxaparin  40 mg DAILY AT 1800   • acetaminophen  1,000 mg Q6HRS    Followed by   • [START ON 4/3/2021] acetaminophen  1,000 mg Q6HRS PRN   • ondansetron  4 mg Q4HRS PRN   • ondansetron  4 mg Q4HRS PRN   • promethazine  12.5-25 mg Q4HRS PRN   • promethazine  12.5-25 mg Q4HRS PRN   • prochlorperazine  5-10 mg Q4HRS PRN   • methocarbamol  750 mg Q8HRS PRN   • labetalol  10 mg Q HOUR PRN   • hydrALAZINE  10 mg Q HOUR PRN   • benzocaine-menthol  1 Lozenge Q2HRS PRN   • calcium carbonate  500 mg BID   • vitamin D  5,000 Units DAILY   • HYDROmorphone  0.5-1 mg Q HOUR PRN    Or   • oxyCODONE immediate-release  5 mg Q3HRS PRN    Or   • oxyCODONE immediate-release  10 mg Q3HRS PRN   • Respiratory Therapy Consult   Continuous RT   • polyethylene glycol/lytes  1 Packet BID   • magnesium hydroxide  30 mL DAILY   • famotidine  20 mg BID    Or   • famotidine  20 mg BID   • norepinephrine (Levophed) infusion  0-30 mcg/min Continuous       Assessment and Plan:  Hospital day #4  POD #3 C4 - C7 laminectomy and fusion  OK to mobilize as able with collar  MAPs >80 for 4 days more. (through sunday)   Ok for q4 neuro checks.   Will need rehab.   Prophylactic anticoagulation: yes         Start date/time: now

## 2021-04-01 NOTE — CARE PLAN
Problem: Infection  Goal: Will remain free from infection  Outcome: PROGRESSING AS EXPECTED     Problem: Bowel/Gastric:  Goal: Normal bowel function is maintained or improved  Outcome: PROGRESSING AS EXPECTED     Problem: Pain Management  Goal: Pain level will decrease to patient's comfort goal  Outcome: PROGRESSING AS EXPECTED     Problem: Skin Integrity  Goal: Risk for impaired skin integrity will decrease  Outcome: PROGRESSING AS EXPECTED     Problem: Respiratory:  Goal: Respiratory status will improve  Outcome: PROGRESSING AS EXPECTED     Problem: Mobility  Goal: Risk for activity intolerance will decrease  Outcome: PROGRESSING AS EXPECTED

## 2021-04-01 NOTE — DISCHARGE PLANNING
"Received phone call from Parul RODRÍGUEZ CM with [facility] [phone]. Update given per request. Parul reports that pt's IP stay will be covered however for dc pt will need to transfer back to [place] to an in-network facility. Parul reports that she would be the  when it comes to authorizing transportation. LSW inquired about in network acute SCI rehabs & Parul referred LSW to [facility] out of area provider through [facility].     LSW called [facility] [phone] option 1 and was informed that Parul with [facility] needs to \"activiate this patient's case\" with them prior to LSW discussing placement options with them.     LSW then met with mom, Sariah, in case management office. LEONELA signed which got faxed to medical records. Fax confirmation stated complete. Original given back to Sariah. Patient completed his advance directive. LSW sent notary request via e-mail.     Sariah then shared some frustrations with her insurance. LSW educated her on conversations with individuals above which relieved her stress. Answered all of Sariah questions and informed her of LSW's plan with insurance.     Plan: [facility]  to contact LSW to discuss in network SCI rehabs   "
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
## 2021-04-01 NOTE — PROGRESS NOTES
2 RN skin check completed with Leticia.   Devices in place nasal cannula, C-collar, ECG leads, lewis, statlock, SCD sleeves, heel float boots, pulse ox, R radial art line, R subclavian central line, 1 PIV, BP cuff  Skin assessed under devices yes.  Confirmed pressure ulcers found on none.  New potential pressure ulcers noted on none. Wound consult placed no.  The following interventions in place q2h turns, repositioning extremities q2h, heel float boots, mepilex to sacrum     Surgical incision to posterior cervical has dressing in place that is clean, dry, and intact.  Avulsion wound to top of head has dermabond. Scabbed and open to air  Sacrum is pink and slow to teresa.

## 2021-04-02 ENCOUNTER — APPOINTMENT (OUTPATIENT)
Dept: RADIOLOGY | Facility: MEDICAL CENTER | Age: 26
DRG: 028 | End: 2021-04-02
Attending: SURGERY
Payer: COMMERCIAL

## 2021-04-02 LAB
ALBUMIN SERPL BCP-MCNC: 3.7 G/DL (ref 3.2–4.9)
ALBUMIN/GLOB SERPL: 1.1 G/DL
ALP SERPL-CCNC: 60 U/L (ref 30–99)
ALT SERPL-CCNC: 26 U/L (ref 2–50)
ANION GAP SERPL CALC-SCNC: 9 MMOL/L (ref 7–16)
AST SERPL-CCNC: 27 U/L (ref 12–45)
BASOPHILS # BLD AUTO: 0.4 % (ref 0–1.8)
BASOPHILS # BLD: 0.05 K/UL (ref 0–0.12)
BILIRUB SERPL-MCNC: 0.4 MG/DL (ref 0.1–1.5)
BUN SERPL-MCNC: 14 MG/DL (ref 8–22)
CALCIUM SERPL-MCNC: 9.3 MG/DL (ref 8.5–10.5)
CHLORIDE SERPL-SCNC: 102 MMOL/L (ref 96–112)
CO2 SERPL-SCNC: 25 MMOL/L (ref 20–33)
CREAT SERPL-MCNC: 0.73 MG/DL (ref 0.5–1.4)
EOSINOPHIL # BLD AUTO: 0.12 K/UL (ref 0–0.51)
EOSINOPHIL NFR BLD: 1 % (ref 0–6.9)
ERYTHROCYTE [DISTWIDTH] IN BLOOD BY AUTOMATED COUNT: 39.8 FL (ref 35.9–50)
GLOBULIN SER CALC-MCNC: 3.3 G/DL (ref 1.9–3.5)
GLUCOSE SERPL-MCNC: 128 MG/DL (ref 65–99)
HCT VFR BLD AUTO: 34.4 % (ref 42–52)
HGB BLD-MCNC: 12.1 G/DL (ref 14–18)
IMM GRANULOCYTES # BLD AUTO: 0.05 K/UL (ref 0–0.11)
IMM GRANULOCYTES NFR BLD AUTO: 0.4 % (ref 0–0.9)
LYMPHOCYTES # BLD AUTO: 2.04 K/UL (ref 1–4.8)
LYMPHOCYTES NFR BLD: 17.5 % (ref 22–41)
MCH RBC QN AUTO: 30.5 PG (ref 27–33)
MCHC RBC AUTO-ENTMCNC: 35.2 G/DL (ref 33.7–35.3)
MCV RBC AUTO: 86.6 FL (ref 81.4–97.8)
MONOCYTES # BLD AUTO: 0.94 K/UL (ref 0–0.85)
MONOCYTES NFR BLD AUTO: 8.1 % (ref 0–13.4)
NEUTROPHILS # BLD AUTO: 8.43 K/UL (ref 1.82–7.42)
NEUTROPHILS NFR BLD: 72.6 % (ref 44–72)
NRBC # BLD AUTO: 0 K/UL
NRBC BLD-RTO: 0 /100 WBC
PLATELET # BLD AUTO: 272 K/UL (ref 164–446)
PMV BLD AUTO: 9.6 FL (ref 9–12.9)
POTASSIUM SERPL-SCNC: 4.7 MMOL/L (ref 3.6–5.5)
PROT SERPL-MCNC: 7 G/DL (ref 6–8.2)
RBC # BLD AUTO: 3.97 M/UL (ref 4.7–6.1)
SODIUM SERPL-SCNC: 136 MMOL/L (ref 135–145)
WBC # BLD AUTO: 11.6 K/UL (ref 4.8–10.8)

## 2021-04-02 PROCEDURE — 700111 HCHG RX REV CODE 636 W/ 250 OVERRIDE (IP): Performed by: SURGERY

## 2021-04-02 PROCEDURE — 700102 HCHG RX REV CODE 250 W/ 637 OVERRIDE(OP): Performed by: PHYSICAL MEDICINE & REHABILITATION

## 2021-04-02 PROCEDURE — 700101 HCHG RX REV CODE 250: Performed by: NEUROLOGICAL SURGERY

## 2021-04-02 PROCEDURE — 700102 HCHG RX REV CODE 250 W/ 637 OVERRIDE(OP): Performed by: SURGERY

## 2021-04-02 PROCEDURE — 99291 CRITICAL CARE FIRST HOUR: CPT | Performed by: SURGERY

## 2021-04-02 PROCEDURE — 700102 HCHG RX REV CODE 250 W/ 637 OVERRIDE(OP): Performed by: PHYSICIAN ASSISTANT

## 2021-04-02 PROCEDURE — 80053 COMPREHEN METABOLIC PANEL: CPT

## 2021-04-02 PROCEDURE — 770022 HCHG ROOM/CARE - ICU (200)

## 2021-04-02 PROCEDURE — 71045 X-RAY EXAM CHEST 1 VIEW: CPT

## 2021-04-02 PROCEDURE — 85025 COMPLETE CBC W/AUTO DIFF WBC: CPT

## 2021-04-02 PROCEDURE — 94669 MECHANICAL CHEST WALL OSCILL: CPT

## 2021-04-02 PROCEDURE — 97530 THERAPEUTIC ACTIVITIES: CPT

## 2021-04-02 PROCEDURE — A9270 NON-COVERED ITEM OR SERVICE: HCPCS | Performed by: SURGERY

## 2021-04-02 PROCEDURE — A9270 NON-COVERED ITEM OR SERVICE: HCPCS | Performed by: PHYSICIAN ASSISTANT

## 2021-04-02 PROCEDURE — 99233 SBSQ HOSP IP/OBS HIGH 50: CPT | Performed by: PHYSICAL MEDICINE & REHABILITATION

## 2021-04-02 PROCEDURE — A9270 NON-COVERED ITEM OR SERVICE: HCPCS | Performed by: PHYSICAL MEDICINE & REHABILITATION

## 2021-04-02 RX ADMIN — DOCUSATE SODIUM 50 MG AND SENNOSIDES 8.6 MG 1 TABLET: 8.6; 5 TABLET, FILM COATED ORAL at 20:40

## 2021-04-02 RX ADMIN — CALCIUM CARBONATE 500 MG: 500 TABLET, CHEWABLE ORAL at 17:32

## 2021-04-02 RX ADMIN — ACETAMINOPHEN 1000 MG: 500 TABLET, FILM COATED ORAL at 05:54

## 2021-04-02 RX ADMIN — PHENYLEPHRINE HCL 10 MG: 10 TABLET, FILM COATED ORAL at 06:21

## 2021-04-02 RX ADMIN — BACLOFEN 10 MG: 10 TABLET ORAL at 05:54

## 2021-04-02 RX ADMIN — GABAPENTIN 600 MG: 300 CAPSULE ORAL at 13:11

## 2021-04-02 RX ADMIN — DOCUSATE SODIUM 100 MG: 100 CAPSULE, LIQUID FILLED ORAL at 17:32

## 2021-04-02 RX ADMIN — ACETAMINOPHEN 1000 MG: 500 TABLET, FILM COATED ORAL at 11:39

## 2021-04-02 RX ADMIN — OXYCODONE HYDROCHLORIDE 10 MG: 10 TABLET ORAL at 11:26

## 2021-04-02 RX ADMIN — PHENYLEPHRINE HCL 10 MG: 10 TABLET, FILM COATED ORAL at 17:32

## 2021-04-02 RX ADMIN — GUAIFENESIN 600 MG: 600 TABLET, EXTENDED RELEASE ORAL at 05:54

## 2021-04-02 RX ADMIN — CALCIUM CARBONATE 500 MG: 500 TABLET, CHEWABLE ORAL at 05:54

## 2021-04-02 RX ADMIN — MIDODRINE HYDROCHLORIDE 10 MG: 5 TABLET ORAL at 05:54

## 2021-04-02 RX ADMIN — PHENYLEPHRINE HCL 10 MG: 10 TABLET, FILM COATED ORAL at 11:39

## 2021-04-02 RX ADMIN — GABAPENTIN 600 MG: 300 CAPSULE ORAL at 09:06

## 2021-04-02 RX ADMIN — GABAPENTIN 600 MG: 300 CAPSULE ORAL at 17:32

## 2021-04-02 RX ADMIN — MIDODRINE HYDROCHLORIDE 10 MG: 5 TABLET ORAL at 13:11

## 2021-04-02 RX ADMIN — GABAPENTIN 600 MG: 300 CAPSULE ORAL at 20:40

## 2021-04-02 RX ADMIN — BACLOFEN 10 MG: 10 TABLET ORAL at 17:33

## 2021-04-02 RX ADMIN — ACETAMINOPHEN 1000 MG: 500 TABLET, FILM COATED ORAL at 17:32

## 2021-04-02 RX ADMIN — ACETAMINOPHEN 1000 MG: 500 TABLET, FILM COATED ORAL at 01:20

## 2021-04-02 RX ADMIN — ENOXAPARIN SODIUM 30 MG: 30 INJECTION SUBCUTANEOUS at 05:54

## 2021-04-02 RX ADMIN — BACLOFEN 10 MG: 10 TABLET ORAL at 11:38

## 2021-04-02 RX ADMIN — MIDODRINE HYDROCHLORIDE 10 MG: 5 TABLET ORAL at 21:37

## 2021-04-02 RX ADMIN — NOREPINEPHRINE BITARTRATE 6 MCG/MIN: 1 INJECTION INTRAVENOUS at 05:28

## 2021-04-02 RX ADMIN — GUAIFENESIN 600 MG: 600 TABLET, EXTENDED RELEASE ORAL at 17:32

## 2021-04-02 RX ADMIN — ENOXAPARIN SODIUM 30 MG: 30 INJECTION SUBCUTANEOUS at 17:33

## 2021-04-02 RX ADMIN — OXYCODONE HYDROCHLORIDE 10 MG: 10 TABLET ORAL at 20:40

## 2021-04-02 RX ADMIN — Medication 5000 UNITS: at 05:53

## 2021-04-02 ASSESSMENT — COGNITIVE AND FUNCTIONAL STATUS - GENERAL
WALKING IN HOSPITAL ROOM: TOTAL
MOVING TO AND FROM BED TO CHAIR: UNABLE
EATING MEALS: TOTAL
PERSONAL GROOMING: TOTAL
MOBILITY SCORE: 6
DRESSING REGULAR UPPER BODY CLOTHING: TOTAL
SUGGESTED CMS G CODE MODIFIER DAILY ACTIVITY: CN
DAILY ACTIVITIY SCORE: 6
TOILETING: TOTAL
DRESSING REGULAR LOWER BODY CLOTHING: TOTAL
MOVING FROM LYING ON BACK TO SITTING ON SIDE OF FLAT BED: UNABLE
STANDING UP FROM CHAIR USING ARMS: TOTAL
TURNING FROM BACK TO SIDE WHILE IN FLAT BAD: UNABLE
HELP NEEDED FOR BATHING: TOTAL
CLIMB 3 TO 5 STEPS WITH RAILING: TOTAL
SUGGESTED CMS G CODE MODIFIER MOBILITY: CN

## 2021-04-02 ASSESSMENT — PAIN DESCRIPTION - PAIN TYPE
TYPE: ACUTE PAIN

## 2021-04-02 ASSESSMENT — FIBROSIS 4 INDEX: FIB4 SCORE: 0.48

## 2021-04-02 ASSESSMENT — GAIT ASSESSMENTS: GAIT LEVEL OF ASSIST: UNABLE TO PARTICIPATE

## 2021-04-02 NOTE — THERAPY
Physical Therapy   Daily Treatment     Patient Name: Enrique Blackmon  Age:  25 y.o., Sex:  male  Medical Record #: [id number]  Today's Date: 4/2/2021     Precautions: Fall Risk, Full Weight Bearing Left Lower Extremity, Spinal / Back Precautions , Cervical Collar      Assessment    The pt's LE's were wrapped prior to mobility training per RN and Physician's request.  The pt sat EOB requiring total assist from supine, and requiring total assist to maintain balance.  His MAP remained >80 throughout sitting EOB, however neck pain limited his tolerance in this position.  PROM LE exercises were completed in sitting EOB prior to returning to supine. He was able to feel pressure in B ankles during passive DF.  Recommend PMR consult for inpatient rehab given dx and motivation to participate.  Will follow.      Plan    Continue current treatment plan.    DC Equipment Recommendations: Unable to determine at this time  Discharge Recommendations: Recommend post-acute placement for additional physical therapy services prior to discharge home      Subjective/Objective       04/02/21 1137   Cognition    Cognition / Consciousness WDL   Level of Consciousness Alert   Comments Pt motivated to participate.   Strength Lower Body   Lower Body Strength  X   Comments Pt demonstrates no volitional movement in LE's   Sensation Lower Body   Lower Extremity Sensation   X   Comments Pt sensation impaired in BLE's to light touch/pressure, however able to identify pressure in the ankle during passive DF   Sitting Lower Body Exercises   Sitting Lower Body Exercises Yes   Ankle Pumps 1 set of 10;Bilateral  (PROM)   Long Arc Quad 1 set of 10;Bilateral  (PROM)   Neuro-Muscular Treatments   Neuro-Muscular Treatments Anterior weight shift   Comments sitting EOB   Other Treatments   Other Treatments Provided applied wrap to BLE's    Balance   Sitting Balance (Static) Dependent   Sitting Balance (Dynamic) Dependent   Weight Shift Sitting Absent   Gait  Analysis   Gait Level Of Assist Unable to Participate   Weight Bearing Status FWB BLE   Bed Mobility    Supine to Sit Total Assist   Sit to Supine Total Assist   Scooting Total Assist   Rolling Total Assist to Rt.;Total Assist to Lt.   Skilled Intervention Facilitation;Sequencing;Tactile Cuing;Verbal Cuing   Comments log roll, HOB raised   Functional Mobility   Sit to Stand Unable to Participate   ICU Target Mobility Level   ICU Mobility - Targeted Level Level 1   How much difficulty does the patient currently have...   Turning over in bed (including adjusting bedclothes, sheets and blankets)? 1   Sitting down on and standing up from a chair with arms (e.g., wheelchair, bedside commode, etc.) 1   Moving from lying on back to sitting on the side of the bed? 1   How much help from another person does the patient currently need...   Moving to and from a bed to a chair (including a wheelchair)? 1   Need to walk in a hospital room? 1   Climbing 3-5 steps with a railing? 1   6 clicks Mobility Score 6   Activity Tolerance   Sitting Edge of Bed 15min   Comments limited by neck pain   Short Term Goals    Short Term Goal # 1 pt will roll R/L with mod A in 6 visits to work towards improved sup > sit   Goal Outcome # 1 goal not met   Short Term Goal # 2 pt will move supine <> sit with mod A in 6 visits for improved independence   Goal Outcome # 2 Goal not met   Short Term Goal # 3 pt will sit EOB 5 min with fair - balance for improved independence   Goal Outcome # 3 Goal not met   Short Term Goal # 4 pt will perform seated SB xfr to WC with mod A in 6 visits for improved OOB mobility   Goal Outcome # 4 Goal not met   Education Group   Education Provided Role of Physical Therapist   Role of Physical Therapist Patient Response Patient;Acceptance;Explanation;Demonstration;Verbal Demonstration;Reinforcement Needed   Anticipated Discharge Equipment and Recommendations   DC Equipment Recommendations Unable to determine at this time    Discharge Recommendations Recommend post-acute placement for additional physical therapy services prior to discharge home

## 2021-04-02 NOTE — DISCHARGE PLANNING
"Spoke with Parul with [facility] [phone] and provided update per her request. Parul reports that she spoke with [facility]  and \"opened pt's claim.\"     LSW called [facility] [phone] option 1 and spoke with Lise who reports that Parul has informed them of patient but they needs clinicals for their MD to review to determine placement options for his needs. (F:[phone]) LSW faxed all requested clinicals & fax confirmation stated complete.     Attempted to update pt & mother however Sariah was not at bedside and pt was speaking with charge RN. Will inform them at later date  "

## 2021-04-02 NOTE — THERAPY
Occupational Therapy  Daily Treatment     Patient Name: Enrique Blackmon  Age:  25 y.o., Sex:  male  Medical Record #: [id number]  Today's Date: 4/2/2021     Precautions  Precautions: (P) Fall Risk, Weight Bearing As Tolerated Left Lower Extremity, Spinal / Back Precautions , Cervical Collar    Comments: collar on at all times    Assessment     Pt currently limited by decreased functional mobility, activity tolerance,  sensation, strength, AROM, coordination, balance, and pain which are affecting pt's ability to complete ADLs/IADLs at baseline. Pt would benefit from OT services in the acute care setting to maximize functional recovery.     Plan    Continue current treatment plan.       Discharge Recommendations: (P) Recommend post-acute placement for additional occupational therapy services prior to discharge home       04/02/21 1200   Activities of Daily Living   Grooming Total Assist   Upper Body Dressing Total Assist   Lower Body Dressing Total Assist   Toileting Total Assist   Functional Mobility   Sit to Stand Unable to Participate   Bed, Chair, Wheelchair Transfer Unable to Participate   Short Term Goals   Short Term Goal # 1 min A to self feed with Ucuff    Goal Outcome # 1 Goal not met   Short Term Goal # 2 min A with UB dressing   Goal Outcome # 2 Goal not met

## 2021-04-02 NOTE — PROGRESS NOTES
Two RN head to toe skin assessment completed with MARCOS Mccullough.  The following areas of concerns are noted:    - Laceration to forehead, C/D/I    Appropriate interventions in place.

## 2021-04-02 NOTE — CARE PLAN
Respiratory Update    Treatment modality: PEP  Frequency:QID     [id number] on IS tolerating well on RA    Pt tolerating current treatments well with no adverse reactions.
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
## 2021-04-02 NOTE — FLOWSHEET NOTE
04/01/21 1745   Vital Signs   Pulse 78   Respiration (!) 23   Pulse Oximetry 95 %   Respiratory Assessment   Level of Consciousness Alert   Breath Sounds   RUL Breath Sounds Clear   RML Breath Sounds Clear   RLL Breath Sounds Diminished   DIVINE Breath Sounds Clear   LLL Breath Sounds Diminished   Secretions   Cough Non Productive   Oxygen   O2 (LPM) 0   FiO2% 21 %   O2 Delivery Device Room air w/o2 available
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
## 2021-04-02 NOTE — DISCHARGE PLANNING
Advance directive notarized. Faxed to [employer] to upload into media. Copy placed in hard chart and original given back to patient & Sariah fry.     Patient's DPOA is Sariah fry [phone] with first alternative being Peg fry [phone].

## 2021-04-02 NOTE — RESPIRATORY CARE
Respiratory Update    Treatment modality: pep/is   Frequency:Q4    Good effort by patient     Pt tolerating current treatments well with no adverse reactions.

## 2021-04-02 NOTE — PROGRESS NOTES
Neurosurgery Progress Note    Subjective:  POD#4 C4 - C7 laminectomy with fusion for C5 LINA B SCI.   C/O pain at op site.  No neuro changes  Looking in to getting rehab acceptance.     Exam:  2/5 bilateral deltoid  2+3/5 bilateral biceps. 1/5 triceps bilaterally.  Minimal , right greater than left.   0/5 to lowers  Pressure  sensation intact to lowers. Cannot localize well.    BP  Min: 106/52  Max: 161/71  Pulse  Av.5  Min: 53  Max: 88  Resp  Av.8  Min: 7  Max: 39  Monitored Temp 2  Av.7 °C (101.7 °F)  Min: 37.9 °C (100.2 °F)  Max: 39.4 °C (102.9 °F)  SpO2  Av.4 %  Min: 91 %  Max: 100 %    No data recorded    Recent Labs     21  0502 21  0505 21  0547   WBC 13.2* 10.3 11.6*   RBC 3.69* 3.80* 3.97*   HEMOGLOBIN 11.3* 11.4* 12.1*   HEMATOCRIT 33.1* 34.3* 34.4*   MCV 89.7 90.3 86.6   MCH 30.6 30.0 30.5   MCHC 34.1 33.2* 35.2   RDW 42.5 42.1 39.8   PLATELETCT 219 227 272   MPV 9.6 9.5 9.6     Recent Labs     21  0502 21  0505 21  0547   SODIUM 139 136 136   POTASSIUM 4.1 4.0 4.7   CHLORIDE 104 101 102   CO2 26 27 25   GLUCOSE 110* 112* 128*   BUN 8 17 14   CREATININE 0.51 0.63 0.73   CALCIUM 8.3* 8.8 9.3               Intake/Output       21 - 21 - 21 Total  Total       Intake    P.O.  --  480 480  --  -- --    P.O. -- 480 480 -- -- --    I.V.  --  1012.2 1012.2  --  -- --    Norepinephrine Volume -- 292.2 292.2 -- -- --    Volume (mL) (NS infusion) -- 720 720 -- -- --    Total Intake -- 1492.2 1492.2 -- -- --       Output    Urine  2675  --  -- --    Output (mL) (Urethral Catheter Temperature probe) 2675 -- -- --    Stool  --  0 0  --  -- --    Number of Times Stooled 3 x 3 x 6 x -- -- --    Measurable Stool (mL) -- 0 0 -- -- --    Total Output 2675 -- -- --       Net I/O     -9335 -717.8 -3232.8 -- -- --            Intake/Output Summary  (Last 24 hours) at 4/2/2021 0754  Last data filed at 4/2/2021 0600  Gross per 24 hour   Intake 1492.18 ml   Output 4175 ml   Net -2682.82 ml            • enoxaparin  30 mg BID   • gabapentin  600 mg 4X/DAY   • phenylephrine  10 mg TID   • artificial tears  1 Application PRN   • midodrine  10 mg Q8HRS   • guaiFENesin ER  600 mg Q12HRS   • baclofen  10 mg TID   • Pharmacy Consult Request  1 Each PHARMACY TO DOSE   • MD ALERT...DO NOT ADMINISTER NSAIDS or ASPIRIN unless ORDERED By Neurosurgery  1 Each PRN   • docusate sodium  100 mg BID   • senna-docusate  1 tablet Nightly   • senna-docusate  1 tablet Q24HRS PRN   • polyethylene glycol/lytes  1 Packet BID PRN   • magnesium hydroxide  30 mL QDAY PRN   • bisacodyl  10 mg Q24HRS PRN   • fleet  1 Each Once PRN   • NS   Continuous   • acetaminophen  1,000 mg Q6HRS    Followed by   • [START ON 4/3/2021] acetaminophen  1,000 mg Q6HRS PRN   • ondansetron  4 mg Q4HRS PRN   • ondansetron  4 mg Q4HRS PRN   • promethazine  12.5-25 mg Q4HRS PRN   • promethazine  12.5-25 mg Q4HRS PRN   • prochlorperazine  5-10 mg Q4HRS PRN   • methocarbamol  750 mg Q8HRS PRN   • labetalol  10 mg Q HOUR PRN   • hydrALAZINE  10 mg Q HOUR PRN   • benzocaine-menthol  1 Lozenge Q2HRS PRN   • calcium carbonate  500 mg BID   • vitamin D  5,000 Units DAILY   • HYDROmorphone  0.5-1 mg Q HOUR PRN    Or   • oxyCODONE immediate-release  5 mg Q3HRS PRN    Or   • oxyCODONE immediate-release  10 mg Q3HRS PRN   • Respiratory Therapy Consult   Continuous RT   • polyethylene glycol/lytes  1 Packet BID   • magnesium hydroxide  30 mL DAILY   • norepinephrine (Levophed) infusion  0-30 mcg/min Continuous       Assessment and Plan:  Hospital day #5  POD #4 C4 - C7 laminectomy and fusion  OK to mobilize as able with collar  MAPs >80 for 3 days more. (through sunday)   Ok for q4 neuro checks.   Will need rehab.   Prophylactic anticoagulation: yes         Start date/time: now

## 2021-04-02 NOTE — CARE PLAN
Problem: Cervical Spine surgery  Goal: Post Op care of the Cervical Spine surgery patient  Outcome: PROGRESSING AS EXPECTED     Problem: Neuro Status  Goal: Monitor neuro status and rapid identification of neuro changes  Outcome: PROGRESSING SLOWER THAN EXPECTED    Problem: Hemodynamic Status  Goal: Vital Signs and Fluid Balance Management  Outcome: PROGRESSING SLOWER THAN EXPECTED   Goal MAP >80, Levo @6, PO Juliano

## 2021-04-03 ENCOUNTER — APPOINTMENT (OUTPATIENT)
Dept: RADIOLOGY | Facility: MEDICAL CENTER | Age: 26
DRG: 028 | End: 2021-04-03
Attending: SURGERY
Payer: COMMERCIAL

## 2021-04-03 LAB
ALBUMIN SERPL BCP-MCNC: 3.6 G/DL (ref 3.2–4.9)
ALBUMIN/GLOB SERPL: 1.1 G/DL
ALP SERPL-CCNC: 56 U/L (ref 30–99)
ALT SERPL-CCNC: 36 U/L (ref 2–50)
ANION GAP SERPL CALC-SCNC: 7 MMOL/L (ref 7–16)
AST SERPL-CCNC: 33 U/L (ref 12–45)
BASOPHILS # BLD AUTO: 0.5 % (ref 0–1.8)
BASOPHILS # BLD: 0.03 K/UL (ref 0–0.12)
BILIRUB SERPL-MCNC: 0.4 MG/DL (ref 0.1–1.5)
BUN SERPL-MCNC: 22 MG/DL (ref 8–22)
CALCIUM SERPL-MCNC: 9 MG/DL (ref 8.5–10.5)
CHLORIDE SERPL-SCNC: 101 MMOL/L (ref 96–112)
CO2 SERPL-SCNC: 28 MMOL/L (ref 20–33)
CREAT SERPL-MCNC: 0.66 MG/DL (ref 0.5–1.4)
EOSINOPHIL # BLD AUTO: 0.15 K/UL (ref 0–0.51)
EOSINOPHIL NFR BLD: 2.4 % (ref 0–6.9)
ERYTHROCYTE [DISTWIDTH] IN BLOOD BY AUTOMATED COUNT: 42 FL (ref 35.9–50)
GLOBULIN SER CALC-MCNC: 3.4 G/DL (ref 1.9–3.5)
GLUCOSE SERPL-MCNC: 123 MG/DL (ref 65–99)
HCT VFR BLD AUTO: 34.4 % (ref 42–52)
HGB BLD-MCNC: 11.6 G/DL (ref 14–18)
IMM GRANULOCYTES # BLD AUTO: 0.03 K/UL (ref 0–0.11)
IMM GRANULOCYTES NFR BLD AUTO: 0.5 % (ref 0–0.9)
LYMPHOCYTES # BLD AUTO: 1.75 K/UL (ref 1–4.8)
LYMPHOCYTES NFR BLD: 28.3 % (ref 22–41)
MCH RBC QN AUTO: 30.2 PG (ref 27–33)
MCHC RBC AUTO-ENTMCNC: 33.7 G/DL (ref 33.7–35.3)
MCV RBC AUTO: 89.6 FL (ref 81.4–97.8)
MONOCYTES # BLD AUTO: 0.65 K/UL (ref 0–0.85)
MONOCYTES NFR BLD AUTO: 10.5 % (ref 0–13.4)
NEUTROPHILS # BLD AUTO: 3.57 K/UL (ref 1.82–7.42)
NEUTROPHILS NFR BLD: 57.8 % (ref 44–72)
NRBC # BLD AUTO: 0 K/UL
NRBC BLD-RTO: 0 /100 WBC
PLATELET # BLD AUTO: 241 K/UL (ref 164–446)
PMV BLD AUTO: 9.7 FL (ref 9–12.9)
POTASSIUM SERPL-SCNC: 4.2 MMOL/L (ref 3.6–5.5)
PROT SERPL-MCNC: 7 G/DL (ref 6–8.2)
RBC # BLD AUTO: 3.84 M/UL (ref 4.7–6.1)
SODIUM SERPL-SCNC: 136 MMOL/L (ref 135–145)
WBC # BLD AUTO: 6.2 K/UL (ref 4.8–10.8)

## 2021-04-03 PROCEDURE — 99291 CRITICAL CARE FIRST HOUR: CPT | Performed by: SURGERY

## 2021-04-03 PROCEDURE — 700105 HCHG RX REV CODE 258: Performed by: SURGERY

## 2021-04-03 PROCEDURE — 700101 HCHG RX REV CODE 250: Performed by: NEUROLOGICAL SURGERY

## 2021-04-03 PROCEDURE — A9270 NON-COVERED ITEM OR SERVICE: HCPCS | Performed by: PHYSICAL MEDICINE & REHABILITATION

## 2021-04-03 PROCEDURE — 700102 HCHG RX REV CODE 250 W/ 637 OVERRIDE(OP): Performed by: PHYSICIAN ASSISTANT

## 2021-04-03 PROCEDURE — 700102 HCHG RX REV CODE 250 W/ 637 OVERRIDE(OP): Performed by: SURGERY

## 2021-04-03 PROCEDURE — 700102 HCHG RX REV CODE 250 W/ 637 OVERRIDE(OP): Performed by: PHYSICAL MEDICINE & REHABILITATION

## 2021-04-03 PROCEDURE — A9270 NON-COVERED ITEM OR SERVICE: HCPCS | Performed by: SURGERY

## 2021-04-03 PROCEDURE — 770022 HCHG ROOM/CARE - ICU (200)

## 2021-04-03 PROCEDURE — 94669 MECHANICAL CHEST WALL OSCILL: CPT

## 2021-04-03 PROCEDURE — 85025 COMPLETE CBC W/AUTO DIFF WBC: CPT

## 2021-04-03 PROCEDURE — 80053 COMPREHEN METABOLIC PANEL: CPT

## 2021-04-03 PROCEDURE — 700111 HCHG RX REV CODE 636 W/ 250 OVERRIDE (IP): Performed by: PHYSICIAN ASSISTANT

## 2021-04-03 PROCEDURE — A9270 NON-COVERED ITEM OR SERVICE: HCPCS | Performed by: PHYSICIAN ASSISTANT

## 2021-04-03 PROCEDURE — 700111 HCHG RX REV CODE 636 W/ 250 OVERRIDE (IP): Performed by: SURGERY

## 2021-04-03 PROCEDURE — 71045 X-RAY EXAM CHEST 1 VIEW: CPT

## 2021-04-03 RX ORDER — BISACODYL 10 MG
10 SUPPOSITORY, RECTAL RECTAL DAILY
Status: DISCONTINUED | OUTPATIENT
Start: 2021-04-03 | End: 2021-04-14 | Stop reason: HOSPADM

## 2021-04-03 RX ADMIN — GABAPENTIN 600 MG: 300 CAPSULE ORAL at 09:46

## 2021-04-03 RX ADMIN — BACLOFEN 10 MG: 10 TABLET ORAL at 06:14

## 2021-04-03 RX ADMIN — PHENYLEPHRINE HCL 10 MG: 10 TABLET, FILM COATED ORAL at 17:41

## 2021-04-03 RX ADMIN — ENOXAPARIN SODIUM 30 MG: 30 INJECTION SUBCUTANEOUS at 17:41

## 2021-04-03 RX ADMIN — Medication 5000 UNITS: at 06:14

## 2021-04-03 RX ADMIN — GUAIFENESIN 600 MG: 600 TABLET, EXTENDED RELEASE ORAL at 17:40

## 2021-04-03 RX ADMIN — OXYCODONE 5 MG: 5 TABLET ORAL at 06:14

## 2021-04-03 RX ADMIN — DOCUSATE SODIUM 100 MG: 100 CAPSULE, LIQUID FILLED ORAL at 06:14

## 2021-04-03 RX ADMIN — GUAIFENESIN 600 MG: 600 TABLET, EXTENDED RELEASE ORAL at 06:14

## 2021-04-03 RX ADMIN — GABAPENTIN 600 MG: 300 CAPSULE ORAL at 20:21

## 2021-04-03 RX ADMIN — OXYCODONE HYDROCHLORIDE 10 MG: 10 TABLET ORAL at 13:43

## 2021-04-03 RX ADMIN — BACLOFEN 10 MG: 10 TABLET ORAL at 11:52

## 2021-04-03 RX ADMIN — BISACODYL 10 MG: 10 SUPPOSITORY RECTAL at 17:41

## 2021-04-03 RX ADMIN — OXYCODONE HYDROCHLORIDE 10 MG: 10 TABLET ORAL at 15:47

## 2021-04-03 RX ADMIN — CALCIUM CARBONATE 500 MG: 500 TABLET, CHEWABLE ORAL at 06:14

## 2021-04-03 RX ADMIN — ACETAMINOPHEN 1000 MG: 500 TABLET, FILM COATED ORAL at 11:52

## 2021-04-03 RX ADMIN — PHENYLEPHRINE HCL 10 MG: 10 TABLET, FILM COATED ORAL at 11:53

## 2021-04-03 RX ADMIN — SODIUM CHLORIDE: 9 INJECTION, SOLUTION INTRAVENOUS at 13:45

## 2021-04-03 RX ADMIN — GABAPENTIN 600 MG: 300 CAPSULE ORAL at 13:43

## 2021-04-03 RX ADMIN — ACETAMINOPHEN 1000 MG: 500 TABLET, FILM COATED ORAL at 06:14

## 2021-04-03 RX ADMIN — METHOCARBAMOL 750 MG: 750 TABLET ORAL at 00:47

## 2021-04-03 RX ADMIN — PHENYLEPHRINE HCL 10 MG: 10 TABLET, FILM COATED ORAL at 06:14

## 2021-04-03 RX ADMIN — MIDODRINE HYDROCHLORIDE 10 MG: 5 TABLET ORAL at 13:42

## 2021-04-03 RX ADMIN — MIDODRINE HYDROCHLORIDE 10 MG: 5 TABLET ORAL at 22:09

## 2021-04-03 RX ADMIN — DOCUSATE SODIUM 100 MG: 100 CAPSULE, LIQUID FILLED ORAL at 17:41

## 2021-04-03 RX ADMIN — ENOXAPARIN SODIUM 30 MG: 30 INJECTION SUBCUTANEOUS at 06:15

## 2021-04-03 RX ADMIN — GABAPENTIN 600 MG: 300 CAPSULE ORAL at 17:40

## 2021-04-03 RX ADMIN — NOREPINEPHRINE BITARTRATE 5 MCG/MIN: 1 INJECTION INTRAVENOUS at 00:20

## 2021-04-03 RX ADMIN — DOCUSATE SODIUM 50 MG AND SENNOSIDES 8.6 MG 1 TABLET: 8.6; 5 TABLET, FILM COATED ORAL at 20:21

## 2021-04-03 RX ADMIN — MIDODRINE HYDROCHLORIDE 10 MG: 5 TABLET ORAL at 06:14

## 2021-04-03 RX ADMIN — OXYCODONE 5 MG: 5 TABLET ORAL at 20:21

## 2021-04-03 RX ADMIN — ONDANSETRON 4 MG: 4 TABLET, ORALLY DISINTEGRATING ORAL at 02:01

## 2021-04-03 RX ADMIN — CALCIUM CARBONATE 500 MG: 500 TABLET, CHEWABLE ORAL at 17:40

## 2021-04-03 RX ADMIN — BACLOFEN 10 MG: 10 TABLET ORAL at 17:40

## 2021-04-03 RX ADMIN — ACETAMINOPHEN 1000 MG: 500 TABLET, FILM COATED ORAL at 00:21

## 2021-04-03 ASSESSMENT — PAIN DESCRIPTION - PAIN TYPE
TYPE: ACUTE PAIN

## 2021-04-03 ASSESSMENT — PATIENT HEALTH QUESTIONNAIRE - PHQ9
SUM OF ALL RESPONSES TO PHQ9 QUESTIONS 1 AND 2: 0
2. FEELING DOWN, DEPRESSED, IRRITABLE, OR HOPELESS: NOT AT ALL
1. LITTLE INTEREST OR PLEASURE IN DOING THINGS: NOT AT ALL

## 2021-04-03 ASSESSMENT — FIBROSIS 4 INDEX: FIB4 SCORE: 0.52

## 2021-04-03 NOTE — CARE PLAN
Respiratory Update    Treatment modality: PEP/IS  Frequency: QID    Actual - 1750 mLs  60% - 1740 mLs    Pt tolerating current treatments well with no adverse reactions.

## 2021-04-03 NOTE — CARE PLAN
Problem: Pain Management  Goal: Pain level will decrease to patient's comfort goal  Outcome: PROGRESSING AS EXPECTED  Intervention: Follow pain managment plan developed in collaboration with patient and Interdisciplinary Team  Note: Medicating patient appropriately with prescribed regimen. Patient reporting decreased pain and showing no signs of distress       Problem: Skin Integrity  Goal: Risk for impaired skin integrity will decrease  Outcome: PROGRESSING AS EXPECTED  Intervention: Assess risk factors for impaired skin integrity and/or pressure ulcers  Note: Assessing skin integrity daily. Q2 turns in place. Rotating medical devices, mepilex in place, barrier cream as needed.

## 2021-04-03 NOTE — DISCHARGE PLANNING
LSW spoke with patient and mom at bedside about dc plan. Pt reports that his goal is to transfer to a SCI rehab in the [place] area where his family support is at. LSW answered all questions at time of meeting.     LSW called [employer] [phone] option 1 and left message to discuss patient's plan     Plan: Insurance MD to review clinicals and approve SCI rehab placement in [place]

## 2021-04-03 NOTE — PROGRESS NOTES
Neurosurgery Progress Note    Subjective:  POD#5 C4 - C7 laminectomy with fusion for C5 LINA B SCI.   C/O pain at op site.  No neuro changes  Looking in to getting rehab acceptance.     Exam:  2/5 bilateral deltoid  2+3/5 bilateral biceps. 1/5 triceps bilaterally.  Minimal , right greater than left.   0/5 to lowers  Pressure  sensation intact to lowers. Cannot localize well.    BP  Min: 111/53  Max: 155/70  Pulse  Av.3  Min: 43  Max: 189  Resp  Av.7  Min: 7  Max: 45  Monitored Temp 2  Av °C (100.4 °F)  Min: 36.5 °C (97.7 °F)  Max: 39.5 °C (103.1 °F)  SpO2  Av.1 %  Min: 93 %  Max: 100 %    No data recorded    Recent Labs     21  0505 21  0547 21  0515   WBC 10.3 11.6* 6.2   RBC 3.80* 3.97* 3.84*   HEMOGLOBIN 11.4* 12.1* 11.6*   HEMATOCRIT 34.3* 34.4* 34.4*   MCV 90.3 86.6 89.6   MCH 30.0 30.5 30.2   MCHC 33.2* 35.2 33.7   RDW 42.1 39.8 42.0   PLATELETCT 227 272 241   MPV 9.5 9.6 9.7     Recent Labs     21  0505 21  0547 21  0515   SODIUM 136 136 136   POTASSIUM 4.0 4.7 4.2   CHLORIDE 101 102 101   CO2 27 25 28   GLUCOSE 112* 128* 123*   BUN 17 14 22   CREATININE 0.63 0.73 0.66   CALCIUM 8.8 9.3 9.0               Intake/Output       21 - 21 -  Total 1900-0659 Total       Intake    P.O.  860  590 1450  --  -- --    P.O.  -- -- --    I.V.  164.1  830.5 994.5  --  -- --    Norepinephrine Volume 164.1 110.5 274.5 -- -- --    Volume (mL) (NS infusion) -- 720 720 -- -- --    Other  --  50 50  --  -- --    Medications (PO/Enteral Liquids) -- 50 50 -- -- --    Total Intake 1024.1 1470.5 2494.5 -- -- --       Output    Urine  2250  1245 3495  --  -- --    Output (mL) (Urethral Catheter Temperature probe) 2250 1245 3495 -- -- --    Total Output 2250 1245 3495 -- -- --       Net I/O     -1225.9 225.5 -1000.5 -- -- --            Intake/Output Summary (Last 24 hours) at  4/3/2021 0937  Last data filed at 4/3/2021 0600  Gross per 24 hour   Intake 2494.53 ml   Output 3095 ml   Net -600.47 ml            • enoxaparin  30 mg BID   • gabapentin  600 mg 4X/DAY   • phenylephrine  10 mg TID   • artificial tears  1 Application PRN   • midodrine  10 mg Q8HRS   • guaiFENesin ER  600 mg Q12HRS   • baclofen  10 mg TID   • Pharmacy Consult Request  1 Each PHARMACY TO DOSE   • MD ALERT...DO NOT ADMINISTER NSAIDS or ASPIRIN unless ORDERED By Neurosurgery  1 Each PRN   • docusate sodium  100 mg BID   • senna-docusate  1 tablet Nightly   • senna-docusate  1 tablet Q24HRS PRN   • polyethylene glycol/lytes  1 Packet BID PRN   • magnesium hydroxide  30 mL QDAY PRN   • bisacodyl  10 mg Q24HRS PRN   • fleet  1 Each Once PRN   • NS   Continuous   • acetaminophen  1,000 mg Q6HRS    Followed by   • acetaminophen  1,000 mg Q6HRS PRN   • ondansetron  4 mg Q4HRS PRN   • ondansetron  4 mg Q4HRS PRN   • promethazine  12.5-25 mg Q4HRS PRN   • promethazine  12.5-25 mg Q4HRS PRN   • prochlorperazine  5-10 mg Q4HRS PRN   • methocarbamol  750 mg Q8HRS PRN   • labetalol  10 mg Q HOUR PRN   • hydrALAZINE  10 mg Q HOUR PRN   • benzocaine-menthol  1 Lozenge Q2HRS PRN   • calcium carbonate  500 mg BID   • vitamin D  5,000 Units DAILY   • HYDROmorphone  0.5-1 mg Q HOUR PRN    Or   • oxyCODONE immediate-release  5 mg Q3HRS PRN    Or   • oxyCODONE immediate-release  10 mg Q3HRS PRN   • Respiratory Therapy Consult   Continuous RT   • polyethylene glycol/lytes  1 Packet BID   • magnesium hydroxide  30 mL DAILY   • norepinephrine (Levophed) infusion  0-30 mcg/min Continuous       Assessment and Plan:  Hospital day #6  POD #45 C4 - C7 laminectomy and fusion  OK to mobilize as able with collar  MAPs >80 for 2 days more. (through sunday)   Ok for q4 neuro checks.   Will need rehab.   Prophylactic anticoagulation: yes         Start date/time: now

## 2021-04-03 NOTE — PROGRESS NOTES
Skin check w/ Anshul RN     Laceration to forehead CDI  Post op, posterior neck site with old drainage, intact.   Sacrum intact, mepilex in place     TAPS system in place, ensured pt free from all devices, rotated BP cuff site, rotate contracture boot and heel float boot [id number], turned pt q2 hours, z flow pillow in place, SCD's repositioned, CHG bath given, linens changed, central and peripheral line dressing's changed, oral care provided, skin check q shift

## 2021-04-03 NOTE — CARE PLAN
Problem: Respiratory:  Goal: Respiratory status will improve  Intervention: Assess and monitor pulmonary status  Note: Monitoring SpO2 continuously via pulse ox probe.   Encouraging and assisting patient to turn, perform strong cough and deep breathe.  Educating and encouraging on incentive spirometry usage.       Problem: Mobility  Goal: Risk for activity intolerance will decrease  Intervention: Assess and monitor signs of activity intolerance  Note: Active and passive ROM exercises provided q2h for all extremities.   Mobilizing patient to edge of bed q shift and as tolerated  Providing pain meds and rest periods prn

## 2021-04-04 ENCOUNTER — APPOINTMENT (OUTPATIENT)
Dept: RADIOLOGY | Facility: MEDICAL CENTER | Age: 26
DRG: 028 | End: 2021-04-04
Attending: SURGERY
Payer: COMMERCIAL

## 2021-04-04 LAB
ALBUMIN SERPL BCP-MCNC: 3.7 G/DL (ref 3.2–4.9)
ALBUMIN/GLOB SERPL: 1.2 G/DL
ALP SERPL-CCNC: 59 U/L (ref 30–99)
ALT SERPL-CCNC: 50 U/L (ref 2–50)
ANION GAP SERPL CALC-SCNC: 9 MMOL/L (ref 7–16)
AST SERPL-CCNC: 38 U/L (ref 12–45)
BASOPHILS # BLD AUTO: 0.5 % (ref 0–1.8)
BASOPHILS # BLD: 0.03 K/UL (ref 0–0.12)
BILIRUB SERPL-MCNC: 0.2 MG/DL (ref 0.1–1.5)
BUN SERPL-MCNC: 15 MG/DL (ref 8–22)
CALCIUM SERPL-MCNC: 8.9 MG/DL (ref 8.5–10.5)
CHLORIDE SERPL-SCNC: 102 MMOL/L (ref 96–112)
CO2 SERPL-SCNC: 24 MMOL/L (ref 20–33)
CREAT SERPL-MCNC: 0.51 MG/DL (ref 0.5–1.4)
EOSINOPHIL # BLD AUTO: 0.2 K/UL (ref 0–0.51)
EOSINOPHIL NFR BLD: 3.1 % (ref 0–6.9)
ERYTHROCYTE [DISTWIDTH] IN BLOOD BY AUTOMATED COUNT: 41.4 FL (ref 35.9–50)
GLOBULIN SER CALC-MCNC: 3.2 G/DL (ref 1.9–3.5)
GLUCOSE SERPL-MCNC: 126 MG/DL (ref 65–99)
HCT VFR BLD AUTO: 33 % (ref 42–52)
HGB BLD-MCNC: 11.2 G/DL (ref 14–18)
IMM GRANULOCYTES # BLD AUTO: 0.02 K/UL (ref 0–0.11)
IMM GRANULOCYTES NFR BLD AUTO: 0.3 % (ref 0–0.9)
LYMPHOCYTES # BLD AUTO: 1.68 K/UL (ref 1–4.8)
LYMPHOCYTES NFR BLD: 25.9 % (ref 22–41)
MCH RBC QN AUTO: 30.5 PG (ref 27–33)
MCHC RBC AUTO-ENTMCNC: 33.9 G/DL (ref 33.7–35.3)
MCV RBC AUTO: 89.9 FL (ref 81.4–97.8)
MONOCYTES # BLD AUTO: 0.67 K/UL (ref 0–0.85)
MONOCYTES NFR BLD AUTO: 10.3 % (ref 0–13.4)
NEUTROPHILS # BLD AUTO: 3.88 K/UL (ref 1.82–7.42)
NEUTROPHILS NFR BLD: 59.9 % (ref 44–72)
NRBC # BLD AUTO: 0 K/UL
NRBC BLD-RTO: 0 /100 WBC
PLATELET # BLD AUTO: 250 K/UL (ref 164–446)
PMV BLD AUTO: 9.7 FL (ref 9–12.9)
POTASSIUM SERPL-SCNC: 4 MMOL/L (ref 3.6–5.5)
PROT SERPL-MCNC: 6.9 G/DL (ref 6–8.2)
RBC # BLD AUTO: 3.67 M/UL (ref 4.7–6.1)
SODIUM SERPL-SCNC: 135 MMOL/L (ref 135–145)
WBC # BLD AUTO: 6.5 K/UL (ref 4.8–10.8)

## 2021-04-04 PROCEDURE — 71045 X-RAY EXAM CHEST 1 VIEW: CPT

## 2021-04-04 PROCEDURE — 700102 HCHG RX REV CODE 250 W/ 637 OVERRIDE(OP): Performed by: SURGERY

## 2021-04-04 PROCEDURE — 99233 SBSQ HOSP IP/OBS HIGH 50: CPT | Performed by: SURGERY

## 2021-04-04 PROCEDURE — 80053 COMPREHEN METABOLIC PANEL: CPT

## 2021-04-04 PROCEDURE — 700111 HCHG RX REV CODE 636 W/ 250 OVERRIDE (IP): Performed by: SURGERY

## 2021-04-04 PROCEDURE — 94669 MECHANICAL CHEST WALL OSCILL: CPT

## 2021-04-04 PROCEDURE — 700111 HCHG RX REV CODE 636 W/ 250 OVERRIDE (IP): Performed by: PHYSICIAN ASSISTANT

## 2021-04-04 PROCEDURE — 700102 HCHG RX REV CODE 250 W/ 637 OVERRIDE(OP): Performed by: PHYSICIAN ASSISTANT

## 2021-04-04 PROCEDURE — 700101 HCHG RX REV CODE 250: Performed by: NEUROLOGICAL SURGERY

## 2021-04-04 PROCEDURE — A9270 NON-COVERED ITEM OR SERVICE: HCPCS | Performed by: SURGERY

## 2021-04-04 PROCEDURE — 700102 HCHG RX REV CODE 250 W/ 637 OVERRIDE(OP): Performed by: PHYSICAL MEDICINE & REHABILITATION

## 2021-04-04 PROCEDURE — A9270 NON-COVERED ITEM OR SERVICE: HCPCS | Performed by: PHYSICAL MEDICINE & REHABILITATION

## 2021-04-04 PROCEDURE — 85025 COMPLETE CBC W/AUTO DIFF WBC: CPT

## 2021-04-04 PROCEDURE — A9270 NON-COVERED ITEM OR SERVICE: HCPCS | Performed by: PHYSICIAN ASSISTANT

## 2021-04-04 PROCEDURE — 770022 HCHG ROOM/CARE - ICU (200)

## 2021-04-04 RX ADMIN — ENOXAPARIN SODIUM 30 MG: 30 INJECTION SUBCUTANEOUS at 05:52

## 2021-04-04 RX ADMIN — BACLOFEN 10 MG: 10 TABLET ORAL at 11:54

## 2021-04-04 RX ADMIN — ENOXAPARIN SODIUM 30 MG: 30 INJECTION SUBCUTANEOUS at 17:18

## 2021-04-04 RX ADMIN — CALCIUM CARBONATE 500 MG: 500 TABLET, CHEWABLE ORAL at 17:18

## 2021-04-04 RX ADMIN — ACETAMINOPHEN 1000 MG: 500 TABLET, FILM COATED ORAL at 11:54

## 2021-04-04 RX ADMIN — CALCIUM CARBONATE 500 MG: 500 TABLET, CHEWABLE ORAL at 05:53

## 2021-04-04 RX ADMIN — GABAPENTIN 600 MG: 300 CAPSULE ORAL at 19:49

## 2021-04-04 RX ADMIN — GUAIFENESIN 600 MG: 600 TABLET, EXTENDED RELEASE ORAL at 17:18

## 2021-04-04 RX ADMIN — BACLOFEN 10 MG: 10 TABLET ORAL at 17:18

## 2021-04-04 RX ADMIN — GABAPENTIN 600 MG: 300 CAPSULE ORAL at 08:44

## 2021-04-04 RX ADMIN — ONDANSETRON 4 MG: 4 TABLET, ORALLY DISINTEGRATING ORAL at 19:52

## 2021-04-04 RX ADMIN — BACLOFEN 10 MG: 10 TABLET ORAL at 05:53

## 2021-04-04 RX ADMIN — PHENYLEPHRINE HCL 10 MG: 10 TABLET, FILM COATED ORAL at 17:19

## 2021-04-04 RX ADMIN — OXYCODONE 5 MG: 5 TABLET ORAL at 05:10

## 2021-04-04 RX ADMIN — GUAIFENESIN 600 MG: 600 TABLET, EXTENDED RELEASE ORAL at 05:52

## 2021-04-04 RX ADMIN — PHENYLEPHRINE HCL 10 MG: 10 TABLET, FILM COATED ORAL at 11:54

## 2021-04-04 RX ADMIN — MIDODRINE HYDROCHLORIDE 10 MG: 5 TABLET ORAL at 21:43

## 2021-04-04 RX ADMIN — ONDANSETRON 4 MG: 4 TABLET, ORALLY DISINTEGRATING ORAL at 15:51

## 2021-04-04 RX ADMIN — NOREPINEPHRINE BITARTRATE 2 MCG/MIN: 1 INJECTION INTRAVENOUS at 11:53

## 2021-04-04 RX ADMIN — MIDODRINE HYDROCHLORIDE 10 MG: 5 TABLET ORAL at 13:04

## 2021-04-04 RX ADMIN — GABAPENTIN 600 MG: 300 CAPSULE ORAL at 17:18

## 2021-04-04 RX ADMIN — MIDODRINE HYDROCHLORIDE 10 MG: 5 TABLET ORAL at 05:53

## 2021-04-04 RX ADMIN — OXYCODONE 5 MG: 5 TABLET ORAL at 12:01

## 2021-04-04 RX ADMIN — Medication 5000 UNITS: at 05:53

## 2021-04-04 RX ADMIN — PHENYLEPHRINE HCL 10 MG: 10 TABLET, FILM COATED ORAL at 05:54

## 2021-04-04 RX ADMIN — GABAPENTIN 600 MG: 300 CAPSULE ORAL at 13:04

## 2021-04-04 ASSESSMENT — PAIN DESCRIPTION - PAIN TYPE
TYPE: ACUTE PAIN

## 2021-04-04 ASSESSMENT — FIBROSIS 4 INDEX: FIB4 SCORE: 0.49

## 2021-04-04 NOTE — PROGRESS NOTES
2 RN skin check complete with Alfonso RN     Devices in place:  -EKG leads, BP cuff, pulse ox probe, b/l SCDs  -PIV to RUE; R SCTL central line  -Campos catheter  -C-collar     Skin assessed under the following areas:  -Mentioned medical devices above  -Bony prominences of the body; posterior head, b/l hips, b/l heels/feet, b/l elbows, sacrum/coccyx  -Surgical incision site to posterior neck      Preventative measures in place including:  -Patient on low air loss mattress  -Q2h turns with pillows  -Q2h repositioning of medical devices, including SCDs  -Preventative mepilexes to appropriate areas              -Elevating heels onto float boot and elbows onto pillows              -Changing prafo boot and heel float boot Q2h              -Changed out c-collar pads     Following areas noted or of concern:               -Top of head laceration with dermabond              -Posterior neck site, dressing CDI    -Coccyx with nonblanching redness to midline, mepilex placed     Wound consult placedYES/NO:  Yes  Wound reported YES/NO: Yes  Appropriate LDAs opened YES/NO: Yes

## 2021-04-04 NOTE — PROGRESS NOTES
Neurosurgery Progress Note    Subjective:  POD#7 C4 - C7 laminectomy with fusion for C5 LINA B SCI.   No neuro changes  Rehab placement pending    Exam:  2/5 bilateral deltoid  2+3/5 bilateral biceps. 1/5 triceps bilaterally.  Minimal , right greater than left 1/5 bilateral.   0/5 to lowers  Pressure  sensation present in lowers. Cannot localize well.    BP  Min: 102/53  Max: 161/72  Pulse  Av.9  Min: 48  Max: 86  Resp  Av.5  Min: 0  Max: 32  Monitored Temp 2  Av.5 °C (99.5 °F)  Min: 36.5 °C (97.7 °F)  Max: 38.7 °C (101.7 °F)  SpO2  Av.9 %  Min: 93 %  Max: 98 %    No data recorded    Recent Labs     21  0547 21  0515 21  0530   WBC 11.6* 6.2 6.5   RBC 3.97* 3.84* 3.67*   HEMOGLOBIN 12.1* 11.6* 11.2*   HEMATOCRIT 34.4* 34.4* 33.0*   MCV 86.6 89.6 89.9   MCH 30.5 30.2 30.5   MCHC 35.2 33.7 33.9   RDW 39.8 42.0 41.4   PLATELETCT 272 241 250   MPV 9.6 9.7 9.7     Recent Labs     21  0547 21  0515 21  0530   SODIUM 136 136 135   POTASSIUM 4.7 4.2 4.0   CHLORIDE 102 101 102   CO2 25 28 24   GLUCOSE 128* 123* 126*   BUN 14 22 15   CREATININE 0.73 0.66 0.51   CALCIUM 9.3 9.0 8.9               Intake/Output       21 - 21 0659 21 - 21 0659       Total  Total       Intake    P.O.  1320  282 1602  --  -- --    P.O. 4374 730 4385 -- -- --    I.V.  334.6  585.7 920.3  --  -- --    Norepinephrine Volume 94.6 100.2 194.8 -- -- --    Volume (mL) (NS infusion) 240 485.5 725.5 -- -- --    Total Intake 1654.6 867.7 2522.3 -- -- --       Output    Urine  1600  --  -- --    Output (mL) (Urethral Catheter Temperature probe) 1600 -- -- --    Stool  --  -- --  --  -- --    Number of Times Stooled -- 4 x 4 x -- -- --    Total Output 1600 -- -- --       Net I/O     54.6 -1162.3 -1107.7 -- -- --            Intake/Output Summary (Last 24 hours) at 2021 5575  Last data filed at  4/4/2021 0611  Gross per 24 hour   Intake 2522.34 ml   Output 3630 ml   Net -1107.66 ml            • bisacodyl  10 mg DAILY   • enoxaparin  30 mg BID   • gabapentin  600 mg 4X/DAY   • phenylephrine  10 mg TID   • artificial tears  1 Application PRN   • midodrine  10 mg Q8HRS   • guaiFENesin ER  600 mg Q12HRS   • baclofen  10 mg TID   • Pharmacy Consult Request  1 Each PHARMACY TO DOSE   • MD ALERT...DO NOT ADMINISTER NSAIDS or ASPIRIN unless ORDERED By Neurosurgery  1 Each PRN   • docusate sodium  100 mg BID   • senna-docusate  1 tablet Nightly   • senna-docusate  1 tablet Q24HRS PRN   • polyethylene glycol/lytes  1 Packet BID PRN   • magnesium hydroxide  30 mL QDAY PRN   • fleet  1 Each Once PRN   • NS   Continuous   • acetaminophen  1,000 mg Q6HRS PRN   • ondansetron  4 mg Q4HRS PRN   • ondansetron  4 mg Q4HRS PRN   • promethazine  12.5-25 mg Q4HRS PRN   • promethazine  12.5-25 mg Q4HRS PRN   • prochlorperazine  5-10 mg Q4HRS PRN   • methocarbamol  750 mg Q8HRS PRN   • labetalol  10 mg Q HOUR PRN   • hydrALAZINE  10 mg Q HOUR PRN   • benzocaine-menthol  1 Lozenge Q2HRS PRN   • calcium carbonate  500 mg BID   • vitamin D  5,000 Units DAILY   • HYDROmorphone  0.5-1 mg Q HOUR PRN    Or   • oxyCODONE immediate-release  5 mg Q3HRS PRN    Or   • oxyCODONE immediate-release  10 mg Q3HRS PRN   • Respiratory Therapy Consult   Continuous RT   • polyethylene glycol/lytes  1 Packet BID   • magnesium hydroxide  30 mL DAILY   • norepinephrine (Levophed) infusion  0-30 mcg/min Continuous       Assessment and Plan:  Hospital day #7  POD #6 C4 - C7 laminectomy and fusion  OK to mobilize as able with collar  MAPs >80 through today  Ok for q4 neuro checks.   OK for rehab placement   Prophylactic anticoagulation: yes         Start date/time: now
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
## 2021-04-04 NOTE — RESPIRATORY CARE
Respiratory Update    Treatment modality: PEP/IS   Frequency: QID    Pt tolerating current treatments well with no adverse reactions.  Will continue to monitor.

## 2021-04-04 NOTE — CARE PLAN
Problem: Respiratory:  Goal: Respiratory status will improve  Intervention: Assess and monitor pulmonary status  Note:   Monitoring SpO2 continuously via pulse ox probe.   Encouraging and assisting patient to turn, perform strong cough and deep breathe.  Educating and encouraging on incentive spirometry usage.          Problem: Mobility  Goal: Risk for activity intolerance will decrease  Intervention: Assess and monitor signs of activity intolerance  Note:   Active and passive ROM exercises provided q2h for all extremities.   Mobilizing patient to edge of bed q shift and as tolerated  Providing pain meds and rest periods prn      Problem: Hemodynamic Status  Goal: Vital Signs and Fluid Balance Management  Note: Weaning/titrating pressor for MAP goal of 80 or greater

## 2021-04-04 NOTE — PROGRESS NOTES
2 RN skin check complete    Devices in place:  -EKG leads, BP cuff, pulse ox probe, b/l SCDs  -PIV to RUE; R SCTL central line  -Campos catheter  -C-collar    Skin assessed under the following areas:  -Mentioned medical devices above  -Bony prominences of the body; posterior head, b/l hips, b/l heels/feet, b/l elbows, sacrum/coccyx  -Surgical incision site to posterior neck     Preventative measures in place including:  -Patient on low air loss mattress  -Q2h turns with pillows  -Q2h repositioning of medical devices, including SCDs  -Preventative mepilexes to appropriate areas   -Elevating heels onto float boot and elbows onto pillows   -Changing prafo boot and heel float boot Q2h   -Changed out c-collar pads    Following areas noted or of concern:    -Top of head laceration with dermabond   -Posterior neck site with old drainage     Wound consult placedYES/NO:  N/a  Wound reported YES/NO: N/a   Appropriate LDAs opened YES/NO: Yes

## 2021-04-04 NOTE — CARE PLAN
Problem: Safety  Goal: Will remain free from injury  Outcome: PROGRESSING AS EXPECTED     Problem: Pain Management  Goal: Pain level will decrease to patient's comfort goal  Outcome: PROGRESSING AS EXPECTED  Intervention: Follow pain managment plan developed in collaboration with patient and Interdisciplinary Team  Note: Medicating patient appropriately with prescribed regimen. Patient reporting decreased pain and showing no signs of distress       Problem: Skin Integrity  Goal: Risk for impaired skin integrity will decrease  Outcome: PROGRESSING AS EXPECTED  Intervention: Assess risk factors for impaired skin integrity and/or pressure ulcers  Note: Assessing skin integrity daily. Q2 turns in place. Rotating medical devices, mepilex in place, barrier cream as needed.

## 2021-04-04 NOTE — PROGRESS NOTES
2 RN skin check complete RN Yolanda    -lac on top of head JUANITA  -incision posterior neck with steri strips, gauze tegaderm dressing changed  -sacrum in tact and blanching    Devices in place  O2 probe, leads, BP cuff, c-collar, bilat SCDs, foot frop boot    Interventions  Q2 turns, rotating devices, heel float boots, pillows to float extremities

## 2021-04-05 ENCOUNTER — APPOINTMENT (OUTPATIENT)
Dept: RADIOLOGY | Facility: MEDICAL CENTER | Age: 26
DRG: 028 | End: 2021-04-05
Attending: SURGERY
Payer: COMMERCIAL

## 2021-04-05 PROBLEM — K59.2 NEUROGENIC BOWEL: Status: ACTIVE | Noted: 2021-04-05

## 2021-04-05 PROBLEM — Z02.9 DISCHARGE PLANNING ISSUES: Status: ACTIVE | Noted: 2021-04-05

## 2021-04-05 PROBLEM — N31.9 NEUROGENIC BLADDER: Status: ACTIVE | Noted: 2021-04-05

## 2021-04-05 PROBLEM — Z78.9 NO CONTRAINDICATION TO DEEP VEIN THROMBOSIS (DVT) PROPHYLAXIS: Status: ACTIVE | Noted: 2021-03-28

## 2021-04-05 PROBLEM — T68.XXXA HYPOTHERMIA: Status: RESOLVED | Noted: 2021-03-28 | Resolved: 2021-04-05

## 2021-04-05 LAB
ALBUMIN SERPL BCP-MCNC: 3.8 G/DL (ref 3.2–4.9)
ALBUMIN/GLOB SERPL: 1.1 G/DL
ALP SERPL-CCNC: 63 U/L (ref 30–99)
ALT SERPL-CCNC: 65 U/L (ref 2–50)
ANION GAP SERPL CALC-SCNC: 10 MMOL/L (ref 7–16)
AST SERPL-CCNC: 43 U/L (ref 12–45)
BASOPHILS # BLD AUTO: 0.3 % (ref 0–1.8)
BASOPHILS # BLD: 0.02 K/UL (ref 0–0.12)
BILIRUB SERPL-MCNC: 0.2 MG/DL (ref 0.1–1.5)
BUN SERPL-MCNC: 18 MG/DL (ref 8–22)
CALCIUM SERPL-MCNC: 9.2 MG/DL (ref 8.5–10.5)
CHLORIDE SERPL-SCNC: 103 MMOL/L (ref 96–112)
CO2 SERPL-SCNC: 25 MMOL/L (ref 20–33)
CREAT SERPL-MCNC: 0.5 MG/DL (ref 0.5–1.4)
EOSINOPHIL # BLD AUTO: 0.16 K/UL (ref 0–0.51)
EOSINOPHIL NFR BLD: 2.6 % (ref 0–6.9)
ERYTHROCYTE [DISTWIDTH] IN BLOOD BY AUTOMATED COUNT: 41.3 FL (ref 35.9–50)
GLOBULIN SER CALC-MCNC: 3.4 G/DL (ref 1.9–3.5)
GLUCOSE SERPL-MCNC: 113 MG/DL (ref 65–99)
HCT VFR BLD AUTO: 34.5 % (ref 42–52)
HGB BLD-MCNC: 11.8 G/DL (ref 14–18)
IMM GRANULOCYTES # BLD AUTO: 0.03 K/UL (ref 0–0.11)
IMM GRANULOCYTES NFR BLD AUTO: 0.5 % (ref 0–0.9)
LYMPHOCYTES # BLD AUTO: 1.62 K/UL (ref 1–4.8)
LYMPHOCYTES NFR BLD: 26 % (ref 22–41)
MAGNESIUM SERPL-MCNC: 2.2 MG/DL (ref 1.5–2.5)
MCH RBC QN AUTO: 30.4 PG (ref 27–33)
MCHC RBC AUTO-ENTMCNC: 34.2 G/DL (ref 33.7–35.3)
MCV RBC AUTO: 88.9 FL (ref 81.4–97.8)
MONOCYTES # BLD AUTO: 0.67 K/UL (ref 0–0.85)
MONOCYTES NFR BLD AUTO: 10.8 % (ref 0–13.4)
NEUTROPHILS # BLD AUTO: 3.73 K/UL (ref 1.82–7.42)
NEUTROPHILS NFR BLD: 59.8 % (ref 44–72)
NRBC # BLD AUTO: 0 K/UL
NRBC BLD-RTO: 0 /100 WBC
PHOSPHATE SERPL-MCNC: 3 MG/DL (ref 2.5–4.5)
PLATELET # BLD AUTO: 294 K/UL (ref 164–446)
PMV BLD AUTO: 9.8 FL (ref 9–12.9)
POTASSIUM SERPL-SCNC: 4.3 MMOL/L (ref 3.6–5.5)
PROT SERPL-MCNC: 7.2 G/DL (ref 6–8.2)
RBC # BLD AUTO: 3.88 M/UL (ref 4.7–6.1)
SODIUM SERPL-SCNC: 138 MMOL/L (ref 135–145)
WBC # BLD AUTO: 6.2 K/UL (ref 4.8–10.8)

## 2021-04-05 PROCEDURE — 85025 COMPLETE CBC W/AUTO DIFF WBC: CPT

## 2021-04-05 PROCEDURE — 700111 HCHG RX REV CODE 636 W/ 250 OVERRIDE (IP): Performed by: SURGERY

## 2021-04-05 PROCEDURE — 94669 MECHANICAL CHEST WALL OSCILL: CPT

## 2021-04-05 PROCEDURE — 700111 HCHG RX REV CODE 636 W/ 250 OVERRIDE (IP): Performed by: PHYSICIAN ASSISTANT

## 2021-04-05 PROCEDURE — 83735 ASSAY OF MAGNESIUM: CPT

## 2021-04-05 PROCEDURE — A9270 NON-COVERED ITEM OR SERVICE: HCPCS | Performed by: SURGERY

## 2021-04-05 PROCEDURE — 99233 SBSQ HOSP IP/OBS HIGH 50: CPT | Performed by: SURGERY

## 2021-04-05 PROCEDURE — 700102 HCHG RX REV CODE 250 W/ 637 OVERRIDE(OP): Performed by: SURGERY

## 2021-04-05 PROCEDURE — 71045 X-RAY EXAM CHEST 1 VIEW: CPT

## 2021-04-05 PROCEDURE — 700105 HCHG RX REV CODE 258: Performed by: SURGERY

## 2021-04-05 PROCEDURE — A9270 NON-COVERED ITEM OR SERVICE: HCPCS | Performed by: PHYSICAL MEDICINE & REHABILITATION

## 2021-04-05 PROCEDURE — 770006 HCHG ROOM/CARE - MED/SURG/GYN SEMI*

## 2021-04-05 PROCEDURE — A9270 NON-COVERED ITEM OR SERVICE: HCPCS | Performed by: PHYSICIAN ASSISTANT

## 2021-04-05 PROCEDURE — 84100 ASSAY OF PHOSPHORUS: CPT

## 2021-04-05 PROCEDURE — 80053 COMPREHEN METABOLIC PANEL: CPT

## 2021-04-05 PROCEDURE — 700102 HCHG RX REV CODE 250 W/ 637 OVERRIDE(OP): Performed by: PHYSICIAN ASSISTANT

## 2021-04-05 PROCEDURE — 700102 HCHG RX REV CODE 250 W/ 637 OVERRIDE(OP): Performed by: PHYSICAL MEDICINE & REHABILITATION

## 2021-04-05 RX ADMIN — GABAPENTIN 600 MG: 300 CAPSULE ORAL at 12:28

## 2021-04-05 RX ADMIN — PHENYLEPHRINE HCL 10 MG: 10 TABLET, FILM COATED ORAL at 05:17

## 2021-04-05 RX ADMIN — BACLOFEN 10 MG: 10 TABLET ORAL at 05:17

## 2021-04-05 RX ADMIN — DOCUSATE SODIUM 100 MG: 100 CAPSULE, LIQUID FILLED ORAL at 18:01

## 2021-04-05 RX ADMIN — BISACODYL 10 MG: 10 SUPPOSITORY RECTAL at 20:27

## 2021-04-05 RX ADMIN — Medication 5000 UNITS: at 05:17

## 2021-04-05 RX ADMIN — OXYCODONE 5 MG: 5 TABLET ORAL at 03:47

## 2021-04-05 RX ADMIN — GUAIFENESIN 600 MG: 600 TABLET, EXTENDED RELEASE ORAL at 18:01

## 2021-04-05 RX ADMIN — ENOXAPARIN SODIUM 30 MG: 30 INJECTION SUBCUTANEOUS at 05:17

## 2021-04-05 RX ADMIN — MIDODRINE HYDROCHLORIDE 10 MG: 5 TABLET ORAL at 13:42

## 2021-04-05 RX ADMIN — GUAIFENESIN 600 MG: 600 TABLET, EXTENDED RELEASE ORAL at 05:17

## 2021-04-05 RX ADMIN — CALCIUM CARBONATE 500 MG: 500 TABLET, CHEWABLE ORAL at 05:17

## 2021-04-05 RX ADMIN — PHENYLEPHRINE HCL 10 MG: 10 TABLET, FILM COATED ORAL at 20:27

## 2021-04-05 RX ADMIN — SODIUM CHLORIDE: 9 INJECTION, SOLUTION INTRAVENOUS at 00:18

## 2021-04-05 RX ADMIN — POLYETHYLENE GLYCOL 3350 1 PACKET: 17 POWDER, FOR SOLUTION ORAL at 18:02

## 2021-04-05 RX ADMIN — CALCIUM CARBONATE 500 MG: 500 TABLET, CHEWABLE ORAL at 18:01

## 2021-04-05 RX ADMIN — BACLOFEN 10 MG: 10 TABLET ORAL at 12:28

## 2021-04-05 RX ADMIN — GABAPENTIN 600 MG: 300 CAPSULE ORAL at 18:01

## 2021-04-05 RX ADMIN — GABAPENTIN 600 MG: 300 CAPSULE ORAL at 09:03

## 2021-04-05 RX ADMIN — ONDANSETRON 4 MG: 4 TABLET, ORALLY DISINTEGRATING ORAL at 02:14

## 2021-04-05 RX ADMIN — DOCUSATE SODIUM 50 MG AND SENNOSIDES 8.6 MG 1 TABLET: 8.6; 5 TABLET, FILM COATED ORAL at 20:28

## 2021-04-05 RX ADMIN — BACLOFEN 10 MG: 10 TABLET ORAL at 18:01

## 2021-04-05 RX ADMIN — PHENYLEPHRINE HCL 10 MG: 10 TABLET, FILM COATED ORAL at 12:28

## 2021-04-05 RX ADMIN — MIDODRINE HYDROCHLORIDE 10 MG: 5 TABLET ORAL at 05:18

## 2021-04-05 RX ADMIN — GABAPENTIN 600 MG: 300 CAPSULE ORAL at 20:28

## 2021-04-05 RX ADMIN — MIDODRINE HYDROCHLORIDE 10 MG: 5 TABLET ORAL at 21:54

## 2021-04-05 RX ADMIN — ENOXAPARIN SODIUM 30 MG: 30 INJECTION SUBCUTANEOUS at 18:01

## 2021-04-05 RX ADMIN — DOCUSATE SODIUM 100 MG: 100 CAPSULE, LIQUID FILLED ORAL at 05:17

## 2021-04-05 ASSESSMENT — ENCOUNTER SYMPTOMS
SENSORY CHANGE: 1
NAUSEA: 0
DIZZINESS: 0
MYALGIAS: 0
CONSTIPATION: 0
SHORTNESS OF BREATH: 0
BACK PAIN: 0
ABDOMINAL PAIN: 0
HEADACHES: 1
VOMITING: 0
NECK PAIN: 0
ROS GI COMMENTS: BM 4/4
FEVER: 0
DOUBLE VISION: 0
BLURRED VISION: 0
COUGH: 0

## 2021-04-05 ASSESSMENT — PAIN DESCRIPTION - PAIN TYPE
TYPE: ACUTE PAIN

## 2021-04-05 ASSESSMENT — PATIENT HEALTH QUESTIONNAIRE - PHQ9
2. FEELING DOWN, DEPRESSED, IRRITABLE, OR HOPELESS: NOT AT ALL
SUM OF ALL RESPONSES TO PHQ9 QUESTIONS 1 AND 2: 0
1. LITTLE INTEREST OR PLEASURE IN DOING THINGS: NOT AT ALL

## 2021-04-05 ASSESSMENT — FIBROSIS 4 INDEX
FIB4 SCORE: 0.42
FIB4 SCORE: 0.42

## 2021-04-05 NOTE — DISCHARGE PLANNING
"Sariah and Josephine report that they called [facility] and was informed by the supervisor there that they have a \"spine program\" but pt would better be served by going to \"[facility] rehab as they focus on SCI rehab.\" Sariah and Josephine requested a referral be sent there as well.     LSW faxed choice form to [facility] for processing.     LSW then called Jana PALAFOX with [employer] and left message informing her about pt's mom's finding about [facility].     Plan:   - Follow up with insurance regarding placement (Need to ensure its a SCI rehab)   - Follow up with [facility] regarding referrals   "

## 2021-04-05 NOTE — DISCHARGE PLANNING
TANIAW checked in with patient and mom, Josephine, this morning at bedside. Discuss plan and goals for dc planning. We're all on the same page no additional questions or concerns.      TANIAW called [facility] [phone] option 2 and was informed that patient's case has been assigned to RNLEO Bates who will be pt's on going CM for discharge. LSW was forwarded to her line however no answer so VM was left. Contact number provided.     SURYA then received a VM from Parul with [facility] [phone] requesting a call back. TANIAW returned Parul's call however no answer. LSW left message and contact number was provided.     Plan:   - Notary to complete AD  - Follow up with Jana PALAFOX with [facility] regarding SCI rehab placement in [place]

## 2021-04-05 NOTE — DISCHARGE PLANNING
Per Glynn, [facility] , [facility] will consider our contract rate if the Medical Director approves a P2P.  Spoke with Dr. Fisher he is agreeable with an admission.  Spoke with Mother Rolle and she is good with [facility].  Spoke with Enrique and Josephine mother and they are shooting for [facility]'s  acute rehab spinal rehabilitation program in [place], [place].  They are very appreciative and have my info for any questions.

## 2021-04-05 NOTE — DISCHARGE PLANNING
Received Choice form at 1410  Agency/Facility Name: [facility]  Referral sent per Choice form at 1411  Faxed to: [phone] and [phone]

## 2021-04-05 NOTE — ASSESSMENT & PLAN NOTE
Neurogenic bladder  Continue lewis until urine output less than 2500cc/24h  Bladder training when clinically appropriate
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
## 2021-04-05 NOTE — CARE PLAN
Problem: Respiratory:  Goal: Respiratory status will improve  Intervention: Assess and monitor pulmonary status  Note:   Monitoring SpO2 continuously via pulse ox probe.   Encouraging and assisting patient to turn, perform strong cough and deep breathe.  Educating and encouraging on incentive spirometry usage.          Problem: Mobility  Goal: Risk for activity intolerance will decrease  Intervention: Assess and monitor signs of activity intolerance  Note:   Active and passive ROM exercises provided q2h for all extremities.   Mobilizing patient to edge of bed q shift and as tolerated  Providing pain meds and rest periods prn      Problem: Hemodynamic Status  Goal: Vital Signs and Fluid Balance Management  Note: Ended hyperperfusion today per Neurosurgery team

## 2021-04-05 NOTE — DISCHARGE PLANNING
Spoke with Jana RODRÍGUEZ CM with [facility] who requested updated clinicals be faxed to her. LSW faxed documents to Jana per her request (F: [phone]). Fax confirmation stated complete.     Jana reports that they're approving patient to go to [facility]'s IP acute rehab spinal rehabilitation program in [place], [place]. Admissions liaison is Bess (P: [phone] F: [phone]). LSW informed Jaan that a discussion on this facility will be held with pt & family and if choice is obtained for this facility then a referral will be sent. Jana mentioned that if pt/family want another option it would need to be approved by [facility]'s MD.     Per APN patient is cleared for transport once dc arrangements are confirmed via ground. Insurance is requesting ground as they stated air transport is only for acute to acute or emergency transport.     LSW called Bess with [facility] and left message requesting further information on their SCI rehab.     LSW then updated Josephine and patient on insurance's approval for [facility] SCI rehab. Both are agreeable. Patient voiced that he would like a referral to be sent and to start this transfer.     LSW completed choice form and faxed to [facility] for processing.   [facility] - acute rehab SCI program   P: [phone]  F: [phone] & [phone]     Hopeful for a transfer later this week.      Per RN, pt has transfer orders out of ICU. LSW explained that he will be assigned a new  but that LSW will update that worker on plan for dc. Patient voiced understanding.     Plan:   - Follow up with rehab referral & determine bed availability   - Work with Parul Haider) regarding transportation auth

## 2021-04-05 NOTE — PROGRESS NOTES
Neurosurgery Progress Note    Subjective:  POD#7 C4 - C7 laminectomy with fusion for C5 LINA B SCI.   No neuro changes  Rehab placement pending    Exam:  2/5 bilateral deltoid  2+3/5 bilateral biceps. 1/5 triceps bilaterally.  Minimal , right greater than left 1/5 bilateral.   0/5 to lowers  Pressure  sensation present in lowers. Cannot localize well.    BP  Min: 106/53  Max: 144/64  Pulse  Av  Min: 43  Max: 72  Resp  Av.4  Min: 7  Max: 53  Monitored Temp 2  Av.7 °C (98 °F)  Min: 36.4 °C (97.5 °F)  Max: 37.6 °C (99.7 °F)  SpO2  Av.5 %  Min: 94 %  Max: 100 %    No data recorded    Recent Labs     21   WBC 6.2 6.5 6.2   RBC 3.84* 3.67* 3.88*   HEMOGLOBIN 11.6* 11.2* 11.8*   HEMATOCRIT 34.4* 33.0* 34.5*   MCV 89.6 89.9 88.9   MCH 30.2 30.5 30.4   MCHC 33.7 33.9 34.2   RDW 42.0 41.4 41.3   PLATELETCT 241 250 294   MPV 9.7 9.7 9.8     Recent Labs     2115   SODIUM 136 135 138   POTASSIUM 4.2 4.0 4.3   CHLORIDE 101 102 103   CO2 28 24 25   GLUCOSE 123* 126* 113*   BUN 22 15 18   CREATININE 0.66 0.51 0.50   CALCIUM 9.0 8.9 9.2               Intake/Output       21 - 2159 21 - 2159       Total  Total       Intake    P.O.    780 1860  --  -- --    P.O. 7975 150 4496 -- -- --    I.V.  383.3  426.9 810.2  --  -- --    Norepinephrine Volume 28.8 66.9 95.7 -- -- --    Volume (mL) (NS infusion) 354.5 360 714.5 -- -- --    Total Intake 1463.3 1206.9 2670.2 -- -- --       Output    Urine  1650  --  -- --    Output (mL) (Urethral Catheter Temperature probe) 1650 -- -- --    Stool  --  -- --  --  -- --    Number of Times Stooled 1 x -- 1 x -- -- --    Total Output 1650 -- -- --       Net I/O     -186.7 -703.1 -889.8 -- -- --            Intake/Output Summary (Last 24 hours) at 2021 0813  Last data filed at 2021  0600  Gross per 24 hour   Intake 2127.16 ml   Output 3410 ml   Net -1282.84 ml            • bisacodyl  10 mg DAILY   • enoxaparin  30 mg BID   • gabapentin  600 mg 4X/DAY   • phenylephrine  10 mg TID   • artificial tears  1 Application PRN   • midodrine  10 mg Q8HRS   • guaiFENesin ER  600 mg Q12HRS   • baclofen  10 mg TID   • Pharmacy Consult Request  1 Each PHARMACY TO DOSE   • MD ALERT...DO NOT ADMINISTER NSAIDS or ASPIRIN unless ORDERED By Neurosurgery  1 Each PRN   • docusate sodium  100 mg BID   • senna-docusate  1 tablet Nightly   • senna-docusate  1 tablet Q24HRS PRN   • polyethylene glycol/lytes  1 Packet BID PRN   • magnesium hydroxide  30 mL QDAY PRN   • fleet  1 Each Once PRN   • NS   Continuous   • acetaminophen  1,000 mg Q6HRS PRN   • ondansetron  4 mg Q4HRS PRN   • ondansetron  4 mg Q4HRS PRN   • promethazine  12.5-25 mg Q4HRS PRN   • promethazine  12.5-25 mg Q4HRS PRN   • prochlorperazine  5-10 mg Q4HRS PRN   • methocarbamol  750 mg Q8HRS PRN   • labetalol  10 mg Q HOUR PRN   • hydrALAZINE  10 mg Q HOUR PRN   • benzocaine-menthol  1 Lozenge Q2HRS PRN   • calcium carbonate  500 mg BID   • vitamin D  5,000 Units DAILY   • HYDROmorphone  0.5-1 mg Q HOUR PRN    Or   • oxyCODONE immediate-release  5 mg Q3HRS PRN    Or   • oxyCODONE immediate-release  10 mg Q3HRS PRN   • Respiratory Therapy Consult   Continuous RT   • polyethylene glycol/lytes  1 Packet BID   • magnesium hydroxide  30 mL DAILY   • norepinephrine (Levophed) infusion  0-30 mcg/min Continuous       Assessment and Plan:  Hospital day #8  POD #7 C4 - C7 laminectomy and fusion  OK to mobilize as able with collar  Can stop MAP goals   Ok for q4 neuro checks.   OK for rehab placement     Prophylactic anticoagulation: yes         Start date/time:

## 2021-04-05 NOTE — DISCHARGE PLANNING
Received Choice form at 1519  Agency/Facility Name: [facility] [phone]  Referral sent per Choice form at 7208  Faxed to [phone]

## 2021-04-05 NOTE — ASSESSMENT & PLAN NOTE
Date of admission: 3/28/2021  Date: 4/5 Transfer orders from SICU  Date: 3/30 Rehab/SNF consult   Referrals to [facility] in place, working on acceptance and transportation arrangements  Hopeful for end of week 4/5-4/9  Cleared for discharge: Yes - Date: 4/5  Discharge delayed: No     Discharge date: TBD

## 2021-04-05 NOTE — DISCHARGE PLANNING
Family obtained  to complete a more detailed medical POA. Within document, it includes financial POA details. [facility] leadership approved for [facility]'s notary to come and complete document. Per [facility] assistant, naun to hopefully come today and notarize document.     Plan:   - Notary to notarize ACP documents   - Follow up with insurance regarding SCI placement   - Check in with pt/family

## 2021-04-06 ENCOUNTER — APPOINTMENT (OUTPATIENT)
Dept: RADIOLOGY | Facility: MEDICAL CENTER | Age: 26
DRG: 028 | End: 2021-04-06
Attending: NURSE PRACTITIONER
Payer: COMMERCIAL

## 2021-04-06 LAB
ANION GAP SERPL CALC-SCNC: 10 MMOL/L (ref 7–16)
BASOPHILS # BLD AUTO: 0.4 % (ref 0–1.8)
BASOPHILS # BLD: 0.03 K/UL (ref 0–0.12)
BUN SERPL-MCNC: 19 MG/DL (ref 8–22)
CALCIUM SERPL-MCNC: 9.6 MG/DL (ref 8.5–10.5)
CHLORIDE SERPL-SCNC: 103 MMOL/L (ref 96–112)
CO2 SERPL-SCNC: 27 MMOL/L (ref 20–33)
CREAT SERPL-MCNC: 0.58 MG/DL (ref 0.5–1.4)
EOSINOPHIL # BLD AUTO: 0.15 K/UL (ref 0–0.51)
EOSINOPHIL NFR BLD: 2.2 % (ref 0–6.9)
ERYTHROCYTE [DISTWIDTH] IN BLOOD BY AUTOMATED COUNT: 41.4 FL (ref 35.9–50)
GLUCOSE SERPL-MCNC: 98 MG/DL (ref 65–99)
HCT VFR BLD AUTO: 33.6 % (ref 42–52)
HGB BLD-MCNC: 11.5 G/DL (ref 14–18)
IMM GRANULOCYTES # BLD AUTO: 0.04 K/UL (ref 0–0.11)
IMM GRANULOCYTES NFR BLD AUTO: 0.6 % (ref 0–0.9)
LYMPHOCYTES # BLD AUTO: 1.79 K/UL (ref 1–4.8)
LYMPHOCYTES NFR BLD: 26.5 % (ref 22–41)
MCH RBC QN AUTO: 30.2 PG (ref 27–33)
MCHC RBC AUTO-ENTMCNC: 34.2 G/DL (ref 33.7–35.3)
MCV RBC AUTO: 88.2 FL (ref 81.4–97.8)
MONOCYTES # BLD AUTO: 0.66 K/UL (ref 0–0.85)
MONOCYTES NFR BLD AUTO: 9.8 % (ref 0–13.4)
NEUTROPHILS # BLD AUTO: 4.09 K/UL (ref 1.82–7.42)
NEUTROPHILS NFR BLD: 60.5 % (ref 44–72)
NRBC # BLD AUTO: 0 K/UL
NRBC BLD-RTO: 0 /100 WBC
PLATELET # BLD AUTO: 286 K/UL (ref 164–446)
PMV BLD AUTO: 9.6 FL (ref 9–12.9)
POTASSIUM SERPL-SCNC: 4.4 MMOL/L (ref 3.6–5.5)
RBC # BLD AUTO: 3.81 M/UL (ref 4.7–6.1)
SODIUM SERPL-SCNC: 140 MMOL/L (ref 135–145)
WBC # BLD AUTO: 6.8 K/UL (ref 4.8–10.8)

## 2021-04-06 PROCEDURE — 700102 HCHG RX REV CODE 250 W/ 637 OVERRIDE(OP): Performed by: SURGERY

## 2021-04-06 PROCEDURE — 97535 SELF CARE MNGMENT TRAINING: CPT

## 2021-04-06 PROCEDURE — A9270 NON-COVERED ITEM OR SERVICE: HCPCS | Performed by: PHYSICAL MEDICINE & REHABILITATION

## 2021-04-06 PROCEDURE — 700111 HCHG RX REV CODE 636 W/ 250 OVERRIDE (IP): Performed by: SURGERY

## 2021-04-06 PROCEDURE — A9270 NON-COVERED ITEM OR SERVICE: HCPCS | Performed by: SURGERY

## 2021-04-06 PROCEDURE — 97530 THERAPEUTIC ACTIVITIES: CPT | Mod: CQ

## 2021-04-06 PROCEDURE — 700102 HCHG RX REV CODE 250 W/ 637 OVERRIDE(OP): Performed by: PHYSICIAN ASSISTANT

## 2021-04-06 PROCEDURE — 71045 X-RAY EXAM CHEST 1 VIEW: CPT

## 2021-04-06 PROCEDURE — 99232 SBSQ HOSP IP/OBS MODERATE 35: CPT | Performed by: PHYSICAL MEDICINE & REHABILITATION

## 2021-04-06 PROCEDURE — 700102 HCHG RX REV CODE 250 W/ 637 OVERRIDE(OP): Performed by: PHYSICAL MEDICINE & REHABILITATION

## 2021-04-06 PROCEDURE — 97112 NEUROMUSCULAR REEDUCATION: CPT | Mod: CQ

## 2021-04-06 PROCEDURE — 85025 COMPLETE CBC W/AUTO DIFF WBC: CPT

## 2021-04-06 PROCEDURE — 97110 THERAPEUTIC EXERCISES: CPT

## 2021-04-06 PROCEDURE — 80048 BASIC METABOLIC PNL TOTAL CA: CPT

## 2021-04-06 PROCEDURE — A9270 NON-COVERED ITEM OR SERVICE: HCPCS | Performed by: PHYSICIAN ASSISTANT

## 2021-04-06 PROCEDURE — 770006 HCHG ROOM/CARE - MED/SURG/GYN SEMI*

## 2021-04-06 PROCEDURE — 36415 COLL VENOUS BLD VENIPUNCTURE: CPT

## 2021-04-06 RX ADMIN — POLYETHYLENE GLYCOL 3350 1 PACKET: 17 POWDER, FOR SOLUTION ORAL at 17:21

## 2021-04-06 RX ADMIN — GABAPENTIN 600 MG: 300 CAPSULE ORAL at 17:22

## 2021-04-06 RX ADMIN — GUAIFENESIN 600 MG: 600 TABLET, EXTENDED RELEASE ORAL at 17:22

## 2021-04-06 RX ADMIN — DOCUSATE SODIUM 100 MG: 100 CAPSULE, LIQUID FILLED ORAL at 05:12

## 2021-04-06 RX ADMIN — BISACODYL 10 MG: 10 SUPPOSITORY RECTAL at 19:14

## 2021-04-06 RX ADMIN — DOCUSATE SODIUM 100 MG: 100 CAPSULE, LIQUID FILLED ORAL at 17:22

## 2021-04-06 RX ADMIN — GABAPENTIN 600 MG: 300 CAPSULE ORAL at 21:27

## 2021-04-06 RX ADMIN — CALCIUM CARBONATE 500 MG: 500 TABLET, CHEWABLE ORAL at 17:22

## 2021-04-06 RX ADMIN — PHENYLEPHRINE HCL 10 MG: 10 TABLET, FILM COATED ORAL at 11:56

## 2021-04-06 RX ADMIN — GABAPENTIN 600 MG: 300 CAPSULE ORAL at 08:38

## 2021-04-06 RX ADMIN — GUAIFENESIN 600 MG: 600 TABLET, EXTENDED RELEASE ORAL at 05:12

## 2021-04-06 RX ADMIN — METHOCARBAMOL 750 MG: 750 TABLET ORAL at 19:14

## 2021-04-06 RX ADMIN — MIDODRINE HYDROCHLORIDE 10 MG: 5 TABLET ORAL at 05:12

## 2021-04-06 RX ADMIN — BACLOFEN 10 MG: 10 TABLET ORAL at 05:12

## 2021-04-06 RX ADMIN — CALCIUM CARBONATE 500 MG: 500 TABLET, CHEWABLE ORAL at 05:12

## 2021-04-06 RX ADMIN — MIDODRINE HYDROCHLORIDE 10 MG: 5 TABLET ORAL at 14:29

## 2021-04-06 RX ADMIN — Medication 5000 UNITS: at 05:12

## 2021-04-06 RX ADMIN — BACLOFEN 10 MG: 10 TABLET ORAL at 11:56

## 2021-04-06 RX ADMIN — ENOXAPARIN SODIUM 30 MG: 30 INJECTION SUBCUTANEOUS at 05:11

## 2021-04-06 RX ADMIN — BACLOFEN 10 MG: 10 TABLET ORAL at 17:21

## 2021-04-06 RX ADMIN — GABAPENTIN 600 MG: 300 CAPSULE ORAL at 14:29

## 2021-04-06 RX ADMIN — DOCUSATE SODIUM 50 MG AND SENNOSIDES 8.6 MG 1 TABLET: 8.6; 5 TABLET, FILM COATED ORAL at 21:27

## 2021-04-06 RX ADMIN — POLYETHYLENE GLYCOL 3350 1 PACKET: 17 POWDER, FOR SOLUTION ORAL at 05:11

## 2021-04-06 RX ADMIN — PHENYLEPHRINE HCL 10 MG: 10 TABLET, FILM COATED ORAL at 17:22

## 2021-04-06 RX ADMIN — ENOXAPARIN SODIUM 30 MG: 30 INJECTION SUBCUTANEOUS at 17:21

## 2021-04-06 RX ADMIN — MIDODRINE HYDROCHLORIDE 10 MG: 5 TABLET ORAL at 21:27

## 2021-04-06 RX ADMIN — PHENYLEPHRINE HCL 10 MG: 10 TABLET, FILM COATED ORAL at 05:12

## 2021-04-06 ASSESSMENT — ENCOUNTER SYMPTOMS
VOMITING: 0
ROS GI COMMENTS: BM 4/4
SHORTNESS OF BREATH: 0
FEVER: 0
NAUSEA: 0
DIZZINESS: 0
COUGH: 0
MYALGIAS: 0
BACK PAIN: 0
HEADACHES: 0
ABDOMINAL PAIN: 0
SENSORY CHANGE: 1
NECK PAIN: 0
DOUBLE VISION: 0
CHILLS: 0
BLURRED VISION: 0

## 2021-04-06 ASSESSMENT — COGNITIVE AND FUNCTIONAL STATUS - GENERAL
MOBILITY SCORE: 6
SUGGESTED CMS G CODE MODIFIER DAILY ACTIVITY: CN
MOVING FROM LYING ON BACK TO SITTING ON SIDE OF FLAT BED: UNABLE
PERSONAL GROOMING: TOTAL
CLIMB 3 TO 5 STEPS WITH RAILING: TOTAL
STANDING UP FROM CHAIR USING ARMS: TOTAL
TURNING FROM BACK TO SIDE WHILE IN FLAT BAD: UNABLE
WALKING IN HOSPITAL ROOM: TOTAL
DRESSING REGULAR UPPER BODY CLOTHING: TOTAL
HELP NEEDED FOR BATHING: TOTAL
MOVING TO AND FROM BED TO CHAIR: UNABLE
TOILETING: TOTAL
DAILY ACTIVITIY SCORE: 6
EATING MEALS: TOTAL
DRESSING REGULAR LOWER BODY CLOTHING: TOTAL
SUGGESTED CMS G CODE MODIFIER MOBILITY: CN

## 2021-04-06 ASSESSMENT — GAIT ASSESSMENTS: GAIT LEVEL OF ASSIST: UNABLE TO PARTICIPATE

## 2021-04-06 ASSESSMENT — FIBROSIS 4 INDEX: FIB4 SCORE: 0.43
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
## 2021-04-06 NOTE — PROGRESS NOTES
Neurosurgery Progress Note    Subjective:  POD#8 C4 - C7 laminectomy with fusion for C5 LINA B SCI.   No neuro changes  Rehab placement pending    Exam:  2/5 bilateral deltoid  2+3/5 bilateral biceps. 1/5 triceps bilaterally.  Minimal , right greater than left 1/5 bilateral.   0/5 to lowers  Pressure  sensation present in lowers. Cannot localize well.    BP  Min: 98/81  Max: 124/57  Pulse  Av.1  Min: 54  Max: 65  Resp  Av.4  Min: 12  Max: 34  Temp  Av.6 °C (97.9 °F)  Min: 36 °C (96.8 °F)  Max: 37.2 °C (98.9 °F)  Monitored Temp 2  Av.7 °C (98 °F)  Min: 36.5 °C (97.7 °F)  Max: 36.9 °C (98.4 °F)  SpO2  Av.3 %  Min: 94 %  Max: 99 %    No data recorded    Recent Labs     213   WBC 6.5 6.2 6.8   RBC 3.67* 3.88* 3.81*   HEMOGLOBIN 11.2* 11.8* 11.5*   HEMATOCRIT 33.0* 34.5* 33.6*   MCV 89.9 88.9 88.2   MCH 30.5 30.4 30.2   MCHC 33.9 34.2 34.2   RDW 41.4 41.3 41.4   PLATELETCT 250 294 286   MPV 9.7 9.8 9.6     Recent Labs     21  0313   SODIUM 135 138 140   POTASSIUM 4.0 4.3 4.4   CHLORIDE 102 103 103   CO2 24 25 27   GLUCOSE 126* 113* 98   BUN 15 18 19   CREATININE 0.51 0.50 0.58   CALCIUM 8.9 9.2 9.6               Intake/Output       21 - 21 - 2159      1900-0659 Total 9536-1384 6338-2283 Total       Intake    P.O.  960  -- 960  --  -- --    P.O. 960 -- 960 -- -- --    I.V.  254.5  -- 254.5  --  -- --    Norepinephrine Volume 14.5 -- 14.5 -- -- --    Volume (mL) (NS infusion) 240 -- 240 -- -- --    Total Intake 1214.5 -- 1214.5 -- -- --       Output    Urine  1100  -- 1100  --  -- --    Output (mL) (Urethral Catheter Temperature probe) 1100 -- 1100 -- -- --    Stool  --  -- --  --  -- --    Number of Times Stooled 2 x -- 2 x -- -- --    Total Output 1100 -- 1100 -- -- --       Net I/O     114.5 -- 114.5 -- -- --            Intake/Output Summary (Last 24 hours) at  4/6/2021 0806  Last data filed at 4/5/2021 1400  Gross per 24 hour   Intake 1140 ml   Output 400 ml   Net 740 ml            • bisacodyl  10 mg DAILY   • enoxaparin  30 mg BID   • gabapentin  600 mg 4X/DAY   • phenylephrine  10 mg TID   • artificial tears  1 Application PRN   • midodrine  10 mg Q8HRS   • guaiFENesin ER  600 mg Q12HRS   • baclofen  10 mg TID   • Pharmacy Consult Request  1 Each PHARMACY TO DOSE   • MD ALERT...DO NOT ADMINISTER NSAIDS or ASPIRIN unless ORDERED By Neurosurgery  1 Each PRN   • docusate sodium  100 mg BID   • senna-docusate  1 tablet Nightly   • senna-docusate  1 tablet Q24HRS PRN   • polyethylene glycol/lytes  1 Packet BID PRN   • magnesium hydroxide  30 mL QDAY PRN   • fleet  1 Each Once PRN   • acetaminophen  1,000 mg Q6HRS PRN   • ondansetron  4 mg Q4HRS PRN   • methocarbamol  750 mg Q8HRS PRN   • benzocaine-menthol  1 Lozenge Q2HRS PRN   • calcium carbonate  500 mg BID   • vitamin D  5,000 Units DAILY   • oxyCODONE immediate-release  5 mg Q3HRS PRN    Or   • oxyCODONE immediate-release  10 mg Q3HRS PRN   • Respiratory Therapy Consult   Continuous RT   • polyethylene glycol/lytes  1 Packet BID   • magnesium hydroxide  30 mL DAILY       Assessment and Plan:  Hospital day #9  POD #8 C4 - C7 laminectomy and fusion  OK to mobilize as able with collar  Ok for q4 neuro checks.   OK for rehab placement   Staples out on POD#14  Neurosurgery signing off. Contact [facility] prn  Prophylactic anticoagulation: yes         Start date/time:

## 2021-04-06 NOTE — PROGRESS NOTES
2 RN skin check performed with: MARCOS Cadena    Sacrum red and slow to teresa, mepilex present. Posterior cervical incision, dressing CDI. R dressing CDI from central line. Elbows, occiput, heels, ears, and shoulder blades CDI and blanching. Aspen collar in place, skin underneath CDI and blanching. Q2 hr turns, heel float boots, waffle overlay, seat cushion on chair when OOB, mepilex to sacrum,indwelling cath, TAPs system, and grey foam for O2 tubing in place to reduce skin breakdown. All appropriate LDA's, wound photos, and wound placed/in place. Education provided on activity and mobility encouraged.

## 2021-04-06 NOTE — CARE PLAN
Problem: Venous Thromboembolism (VTW)/Deep Vein Thrombosis (DVT) Prevention:  Goal: Patient will participate in Venous Thrombosis (VTE)/Deep Vein Thrombosis (DVT)Prevention Measures  Outcome: PROGRESSING AS EXPECTED  Pt educated on importance of SCDs/ROM to prevent DVT/VTEs. Pt verbalizes understanding. SCDs in place. Pt ambulates occasionally and participates in ROM.      Problem: Skin Integrity  Goal: Risk for impaired skin integrity will decrease  Outcome: PROGRESSING AS EXPECTED  Pt educated on importance of Q2H turns to maintain skin integrity r/t deficits. Pt verbalizes understanding. Q2H turns in place. Waffle mattress, indwelling cath, TAPs system, and pillows for support/repositioning present.

## 2021-04-06 NOTE — CARE PLAN
Problem: Communication  Goal: The ability to communicate needs accurately and effectively will improve  4/6/2021 0042 by Melly Rendon R.N.  Outcome: PROGRESSING AS EXPECTED  4/5/2021 2123 by Melly Rendon R.N.  Outcome: PROGRESSING AS EXPECTED     Problem: Safety  Goal: Will remain free from falls  4/6/2021 0042 by Melly Rendon R.N.  Outcome: PROGRESSING AS EXPECTED  Note: Bed alarm on. Bed in lowest, locked position. Call light and belongings within reach. Pt educated on risk.   4/5/2021 2123 by Melly Rendon R.N.  Outcome: PROGRESSING AS EXPECTED  Note: Bed alarm on. Bed in lowest, locked      Problem: Venous Thromboembolism (VTW)/Deep Vein Thrombosis (DVT) Prevention:  Goal: Patient will participate in Venous Thrombosis (VTE)/Deep Vein Thrombosis (DVT)Prevention Measures  Outcome: PROGRESSING AS EXPECTED  Note: SCDs in place. Anticoagulation already ordered.      Problem: Bowel/Gastric:  Goal: Will not experience complications related to bowel motility  Outcome: PROGRESSING AS EXPECTED     Problem: Discharge Barriers/Planning  Goal: Patient's continuum of care needs will be met  Outcome: PROGRESSING AS EXPECTED     Problem: Respiratory:  Goal: Respiratory status will improve  Outcome: PROGRESSING AS EXPECTED     Problem: Pain Management  Goal: Pain level will decrease to patient's comfort goal  Outcome: PROGRESSING AS EXPECTED     Problem: Skin Integrity  Goal: Risk for impaired skin integrity will decrease  Outcome: PROGRESSING AS EXPECTED

## 2021-04-06 NOTE — DISCHARGE PLANNING
@1404  Agency/Facility Name: [facility]  Spoke To: Admission  Outcome: Talked with family, will submit auth.    @1325  Agency/Facility Name: [facility]  Outcome: Left message, awaiting call back.    @0940  Agency/Facility Name: [facility]  Outcome: Left message, awaiting call back.

## 2021-04-06 NOTE — DISCHARGE PLANNING
Accessed chart as LSW received a call from Parul with [facility]. LSW forwarded call to pt's new , Senia. Hand off given.

## 2021-04-06 NOTE — DISCHARGE PLANNING
Anticipated Discharge Disposition: Acute rehab    Action: LEO spoke with Jana from [facility] ([phone]) and provided update on referral to [facility].  Jana reports she will check to ensure [facility] is contracted with pt's insurance and call this CM back.     LEO received call from Parul @ [facility] ([phone]) requesting call back.  LEO returned call, no answer received but ANSON left with contact number.     LEO updated pt at bedside on discharge plan and referral progress. Pt thanked LEO for update.     Barriers to Discharge:   Acute rehab acceptance  Insurance authorization pending    Plan: Care coordination to follow up on referrals and insurance for placement.       1350  Received call back from Parul at [facility]; discussed discharge plan. Parul requested to know whether pt was stable to be driven via ambulance to accepting facility.  LEO spoke with trauma APRN regarding this inquiry. Per APRN, it would be better for pt to be flown to facility due to increased risk of pressure related injury and the need for close management of his blood pressure.  LEO placed return call to Parul, ANSON left. Parul called back; information provided.

## 2021-04-06 NOTE — CARE PLAN
Problem: Venous Thromboembolism (VTW)/Deep Vein Thrombosis (DVT) Prevention:  Goal: Patient will participate in Venous Thrombosis (VTE)/Deep Vein Thrombosis (DVT)Prevention Measures  Outcome: PROGRESSING AS EXPECTED  Pt educated on importance of SCDs/ROM to prevent DVT/VTEs. Pt verbalizes understanding. SCDs in place. Pt ambulates occasionally and participates in ROM.     Problem: Skin Integrity  Goal: Risk for impaired skin integrity will decrease  Outcome: PROGRESSING AS EXPECTED  Pt educated on importance of Q2H turns to maintain skin integrity r/t deficits. Pt verbalizes understanding. Q2H turns in place. Waffle mattress, indwelling catheter, mepilex, TAPs system, and pillows for support/repositioning present.

## 2021-04-06 NOTE — THERAPY
"Physical Therapy   Daily Treatment     Patient Name: Enrique Blackmon  Age:  25 y.o., Sex:  male  Medical Record #: [id number]  Today's Date: 4/6/2021     Precautions: Fall Risk, Spinal / Back Precautions , Cervical Collar  , Weight Bearing As Tolerated Left Lower Extremity(C-Collar AAT)    Assessment    Pt continues to present highly motivated to participate. Pt continues to be limited by neck pain. He did state today was less painful than previous attempts but still highly painful towards the end of session sitting EOB. Pt did attempt to assist w/ cross body reaching for rolling. He has better proprioception and sensation on the L than the R. Pt w/ more spasms on the R than the L during stretching and PNF. Pt eager for post acute placement.    Plan    Continue current treatment plan.    DC Equipment Recommendations: Unable to determine at this time  Discharge Recommendations: Recommend post-acute placement for additional physical therapy services prior to discharge home      Subjective    \"I really want to get my arms back so I can figure out holding myself up.\"     Objective       04/06/21 1202   Precautions   Precautions Fall Risk;Spinal / Back Precautions ;Cervical Collar  ;Weight Bearing As Tolerated Left Lower Extremity  (C-Collar AAT)   Comments C-Collar AAT   Gait Analysis   Gait Level Of Assist Unable to Participate   Bed Mobility    Supine to Sit Total Assist   Sit to Supine Total Assist   Scooting Total Assist   Rolling Total Assist to Rt.;Total Assist to Lt.   Comments Pt attempts to reach w/ UE's cross body during rolling. Once upright, states pain better w/ \"arm bar\" across his neck and resting back on the person behind hims abdomen.   Functional Mobility   Sit to Stand Unable to Participate   Bed, Chair, Wheelchair Transfer Unable to Participate   Short Term Goals    Short Term Goal # 1 pt will roll R/L with mod A in 6 visits to work towards improved sup > sit   Goal Outcome # 1 goal not met   Short Term " Goal # 2 pt will move supine <> sit with mod A in 6 visits for improved independence   Goal Outcome # 2 Goal not met   Short Term Goal # 3 pt will sit EOB 5 min with fair - balance for improved independence   Goal Outcome # 3 Goal not met   Short Term Goal # 4 pt will perform seated SB xfr to WC with mod A in 6 visits for improved OOB mobility   Goal Outcome # 4 Goal not met

## 2021-04-06 NOTE — THERAPY
Occupational Therapy  Daily Treatment     Patient Name: Enrique Blackmon  Age:  25 y.o., Sex:  male  Medical Record #: [id number]  Today's Date: 4/6/2021     Precautions  Precautions: Fall Risk, Cervical Collar  , Spinal / Back Precautions , Weight Bearing As Tolerated Left Lower Extremity  Comments: cervical collar AAT    Assessment    Pt seen for session focused on AROM/strengthening of BUE. AAROM of BUE provided in all ranges. Pt demonstrated good trapezius and bicep activation bilaterally with trace wrist extension and tricep activation. RUE appears to have greater strength than left. Pt educated on pressure sore prevention and goals in acute setting.     Plan    Continue current treatment plan.    DC Equipment Recommendations: Unable to determine at this time  Discharge Recommendations: Recommend post-acute placement for additional occupational therapy services prior to discharge home    Subjective    Pt alert, very pleasant, and cooperative. Pt with c/o L scapular pain. Adjusted pt in bed to comfort.      Objective       04/06/21 1624   Cognition    Cognition / Consciousness WDL   Level of Consciousness Alert   Comments very pleasant, cooperative, receptive to education   Active ROM Upper Body   Active ROM Upper Body  X   Dominant Hand Right   Comments AROM of bilateral traps, biceps, trace tricep R > L, trace wrist ext bilaterally   Sensation Upper Body   Upper Extremity Sensation  X   Comments c/o LUE numbness   Supine Upper Body Exercises   Supine Upper Body Exercises Yes   Comments BUE AAROM in all ranges, resistance applied to internal/external rotation exercises, AAROM bringing hands to mouth, nose, and eyes    Other Treatments   Other Treatments Provided Education on importance of pressure relief to prevent pressure sores, education on importance of skin integrity and maintaing UE ROM    Patient / Family Goals   Patient / Family Goal #1 to return home   Short Term Goals   Short Term Goal # 1 min A to self feed  with [employer]    Goal Outcome # 1 Goal not met   Short Term Goal # 2 min A with UB dressing   Goal Outcome # 2 Goal not met
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
## 2021-04-07 PROBLEM — Z11.9 SCREENING EXAMINATION FOR INFECTIOUS DISEASE: Status: RESOLVED | Noted: 2021-03-28 | Resolved: 2021-04-07

## 2021-04-07 LAB
BASOPHILS # BLD AUTO: 0.6 % (ref 0–1.8)
BASOPHILS # BLD: 0.05 K/UL (ref 0–0.12)
EOSINOPHIL # BLD AUTO: 0.1 K/UL (ref 0–0.51)
EOSINOPHIL NFR BLD: 1.2 % (ref 0–6.9)
ERYTHROCYTE [DISTWIDTH] IN BLOOD BY AUTOMATED COUNT: 40.3 FL (ref 35.9–50)
HCT VFR BLD AUTO: 35.1 % (ref 42–52)
HGB BLD-MCNC: 12.2 G/DL (ref 14–18)
IMM GRANULOCYTES # BLD AUTO: 0.04 K/UL (ref 0–0.11)
IMM GRANULOCYTES NFR BLD AUTO: 0.5 % (ref 0–0.9)
LYMPHOCYTES # BLD AUTO: 2.14 K/UL (ref 1–4.8)
LYMPHOCYTES NFR BLD: 25.2 % (ref 22–41)
MCH RBC QN AUTO: 30.6 PG (ref 27–33)
MCHC RBC AUTO-ENTMCNC: 34.8 G/DL (ref 33.7–35.3)
MCV RBC AUTO: 88 FL (ref 81.4–97.8)
MONOCYTES # BLD AUTO: 0.72 K/UL (ref 0–0.85)
MONOCYTES NFR BLD AUTO: 8.5 % (ref 0–13.4)
NEUTROPHILS # BLD AUTO: 5.44 K/UL (ref 1.82–7.42)
NEUTROPHILS NFR BLD: 64 % (ref 44–72)
NRBC # BLD AUTO: 0 K/UL
NRBC BLD-RTO: 0 /100 WBC
PLATELET # BLD AUTO: 367 K/UL (ref 164–446)
PMV BLD AUTO: 9.4 FL (ref 9–12.9)
RBC # BLD AUTO: 3.99 M/UL (ref 4.7–6.1)
WBC # BLD AUTO: 8.5 K/UL (ref 4.8–10.8)

## 2021-04-07 PROCEDURE — A9270 NON-COVERED ITEM OR SERVICE: HCPCS | Performed by: SURGERY

## 2021-04-07 PROCEDURE — 700111 HCHG RX REV CODE 636 W/ 250 OVERRIDE (IP): Performed by: SURGERY

## 2021-04-07 PROCEDURE — 97535 SELF CARE MNGMENT TRAINING: CPT | Mod: CQ

## 2021-04-07 PROCEDURE — A9270 NON-COVERED ITEM OR SERVICE: HCPCS | Performed by: PHYSICAL MEDICINE & REHABILITATION

## 2021-04-07 PROCEDURE — 99233 SBSQ HOSP IP/OBS HIGH 50: CPT | Performed by: PHYSICAL MEDICINE & REHABILITATION

## 2021-04-07 PROCEDURE — 700102 HCHG RX REV CODE 250 W/ 637 OVERRIDE(OP): Performed by: PHYSICAL MEDICINE & REHABILITATION

## 2021-04-07 PROCEDURE — 36415 COLL VENOUS BLD VENIPUNCTURE: CPT

## 2021-04-07 PROCEDURE — 97530 THERAPEUTIC ACTIVITIES: CPT

## 2021-04-07 PROCEDURE — 97530 THERAPEUTIC ACTIVITIES: CPT | Mod: CQ

## 2021-04-07 PROCEDURE — 700102 HCHG RX REV CODE 250 W/ 637 OVERRIDE(OP): Performed by: SURGERY

## 2021-04-07 PROCEDURE — 85025 COMPLETE CBC W/AUTO DIFF WBC: CPT

## 2021-04-07 PROCEDURE — A9270 NON-COVERED ITEM OR SERVICE: HCPCS | Performed by: PHYSICIAN ASSISTANT

## 2021-04-07 PROCEDURE — 700102 HCHG RX REV CODE 250 W/ 637 OVERRIDE(OP): Performed by: PHYSICIAN ASSISTANT

## 2021-04-07 PROCEDURE — 770006 HCHG ROOM/CARE - MED/SURG/GYN SEMI*

## 2021-04-07 PROCEDURE — 97110 THERAPEUTIC EXERCISES: CPT

## 2021-04-07 PROCEDURE — 97110 THERAPEUTIC EXERCISES: CPT | Mod: CQ

## 2021-04-07 RX ORDER — PSEUDOEPHEDRINE HCL 120 MG/1
120 TABLET, FILM COATED, EXTENDED RELEASE ORAL DAILY
Status: CANCELLED | OUTPATIENT
Start: 2021-04-07

## 2021-04-07 RX ADMIN — CALCIUM CARBONATE 500 MG: 500 TABLET, CHEWABLE ORAL at 18:04

## 2021-04-07 RX ADMIN — BACLOFEN 10 MG: 10 TABLET ORAL at 13:09

## 2021-04-07 RX ADMIN — ENOXAPARIN SODIUM 30 MG: 30 INJECTION SUBCUTANEOUS at 18:03

## 2021-04-07 RX ADMIN — GUAIFENESIN 600 MG: 600 TABLET, EXTENDED RELEASE ORAL at 05:21

## 2021-04-07 RX ADMIN — CALCIUM CARBONATE 500 MG: 500 TABLET, CHEWABLE ORAL at 05:21

## 2021-04-07 RX ADMIN — MIDODRINE HYDROCHLORIDE 10 MG: 5 TABLET ORAL at 20:41

## 2021-04-07 RX ADMIN — BISACODYL 10 MG: 10 SUPPOSITORY RECTAL at 18:04

## 2021-04-07 RX ADMIN — ACETAMINOPHEN 1000 MG: 500 TABLET, FILM COATED ORAL at 11:31

## 2021-04-07 RX ADMIN — GUAIFENESIN 600 MG: 600 TABLET, EXTENDED RELEASE ORAL at 18:04

## 2021-04-07 RX ADMIN — METHOCARBAMOL 750 MG: 750 TABLET ORAL at 11:31

## 2021-04-07 RX ADMIN — POLYETHYLENE GLYCOL 3350 1 PACKET: 17 POWDER, FOR SOLUTION ORAL at 18:04

## 2021-04-07 RX ADMIN — DOCUSATE SODIUM 50 MG AND SENNOSIDES 8.6 MG 1 TABLET: 8.6; 5 TABLET, FILM COATED ORAL at 20:41

## 2021-04-07 RX ADMIN — Medication 5000 UNITS: at 05:20

## 2021-04-07 RX ADMIN — DOCUSATE SODIUM 100 MG: 100 CAPSULE, LIQUID FILLED ORAL at 05:21

## 2021-04-07 RX ADMIN — BACLOFEN 10 MG: 10 TABLET ORAL at 18:04

## 2021-04-07 RX ADMIN — GABAPENTIN 600 MG: 300 CAPSULE ORAL at 20:41

## 2021-04-07 RX ADMIN — POLYETHYLENE GLYCOL 3350 1 PACKET: 17 POWDER, FOR SOLUTION ORAL at 05:21

## 2021-04-07 RX ADMIN — GABAPENTIN 600 MG: 300 CAPSULE ORAL at 13:09

## 2021-04-07 RX ADMIN — MIDODRINE HYDROCHLORIDE 10 MG: 5 TABLET ORAL at 13:09

## 2021-04-07 RX ADMIN — GABAPENTIN 600 MG: 300 CAPSULE ORAL at 18:04

## 2021-04-07 RX ADMIN — MIDODRINE HYDROCHLORIDE 10 MG: 5 TABLET ORAL at 05:21

## 2021-04-07 RX ADMIN — DOCUSATE SODIUM 100 MG: 100 CAPSULE, LIQUID FILLED ORAL at 18:04

## 2021-04-07 RX ADMIN — GABAPENTIN 600 MG: 300 CAPSULE ORAL at 09:02

## 2021-04-07 RX ADMIN — BACLOFEN 10 MG: 10 TABLET ORAL at 05:20

## 2021-04-07 RX ADMIN — ENOXAPARIN SODIUM 30 MG: 30 INJECTION SUBCUTANEOUS at 05:21

## 2021-04-07 ASSESSMENT — COGNITIVE AND FUNCTIONAL STATUS - GENERAL
EATING MEALS: TOTAL
SUGGESTED CMS G CODE MODIFIER MOBILITY: CN
MOVING TO AND FROM BED TO CHAIR: UNABLE
TOILETING: TOTAL
MOBILITY SCORE: 6
DRESSING REGULAR UPPER BODY CLOTHING: TOTAL
PERSONAL GROOMING: TOTAL
MOVING FROM LYING ON BACK TO SITTING ON SIDE OF FLAT BED: UNABLE
DRESSING REGULAR LOWER BODY CLOTHING: TOTAL
CLIMB 3 TO 5 STEPS WITH RAILING: TOTAL
DAILY ACTIVITIY SCORE: 6
TURNING FROM BACK TO SIDE WHILE IN FLAT BAD: UNABLE
WALKING IN HOSPITAL ROOM: TOTAL
HELP NEEDED FOR BATHING: TOTAL
SUGGESTED CMS G CODE MODIFIER DAILY ACTIVITY: CN
STANDING UP FROM CHAIR USING ARMS: TOTAL

## 2021-04-07 ASSESSMENT — ENCOUNTER SYMPTOMS
ROS GI COMMENTS: BM 4/6
PSYCHIATRIC NEGATIVE: 1
MYALGIAS: 1
RESPIRATORY NEGATIVE: 1
SENSORY CHANGE: 1
WEAKNESS: 1

## 2021-04-07 ASSESSMENT — GAIT ASSESSMENTS: GAIT LEVEL OF ASSIST: UNABLE TO PARTICIPATE

## 2021-04-07 ASSESSMENT — FIBROSIS 4 INDEX: FIB4 SCORE: 0.33
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
## 2021-04-07 NOTE — CARE PLAN
Problem: Safety  Goal: Will remain free from falls  Outcome: PROGRESSING AS EXPECTED  Call light and belongings within reach. Bed locked in lowest position. Bed alarm on. Fall precautions in place. Pt calls appropriately.     Problem: Skin Integrity  Goal: Risk for impaired skin integrity will decrease  Outcome: PROGRESSING AS EXPECTED  Pt educated on importance of Q2H turns to maintain skin integrity r/t deficits from stroke. Pt verbalizes understanding. Q2H turns in place. Waffle mattress, indwelling cath, mepilex, TAPs system, and pillows for support/repositioning present.

## 2021-04-07 NOTE — THERAPY
Physical Therapy   Daily Treatment     Patient Name: Enrique Blackmon  Age:  25 y.o., Sex:  male  Medical Record #: [id number]  Today's Date: 4/7/2021     Precautions: Fall Risk, Cervical Collar  , Spinal / Back Precautions     Assessment    Pt found flat in the bed but willing to participate w/PT,mom BS and very helpful. Pt's Rt UE w/more active strength than his Lft. Flaccid bilat LE's w/some spasticity. Pt c/o Lft shoulder blade pain once seated but was able to get it to subside w/positioning. Pt transferred to the cardiac chair seated on ROHO cushion w/2 hr OOB limitation. Set up tx time for tomorrow @ 10:30, to get pt OOB for lunch.     Plan    Treatment plan modified to 5 times per week until therapy goals are met for the following treatments:  Bed Mobility, Neuro Re-Education / Balance, Therapeutic Activities and Therapeutic Exercises.    DC Equipment Recommendations: Unable to determine at this time  Discharge Recommendations: Recommend post-acute placement for additional physical therapy services prior to discharge home       Objective       04/07/21 1318   Other Treatments   Other Treatments Provided PROM bilat LE's w/bilat HC stretch. AAROM Bilat UE's. Donned ace wraps and abdominal binder before OOB to the cardiac chair seated on ROHO cushion. BP's: Supine 104/51->HOB elevated 104/46, seated in cardiac chair w/feet in dependent position 94/53->102/52. No c/o dizziness.    Gait Analysis   Gait Level Of Assist Unable to Participate   Bed Mobility    Rolling Maximum Assist to Lt.;Total Assist to Rt.   Skilled Intervention Verbal Cuing;Postural Facilitation;Compensatory Strategies   Comments Pt assissted more w/rolling to the Lft w/hooking of Rt UE to assist w/pulling. Once on his side he was able to hold onto railing. More assist rolling to the Rt. Ankles crossed to assist w/rolling.    Functional Mobility   Sit to Stand Unable to Participate   Bed, Chair, Wheelchair Transfer Total Assist  (bed->cardiac chair)    Transfer Method Slide Board  (supine)   Skilled Intervention Verbal Cuing;Postural Facilitation;Compensatory Strategies   Comments Initiated transfer to the cardiac chair today seated on ROHO cushion w/2 hr OOB limitation.    How much difficulty does the patient currently have...   Turning over in bed (including adjusting bedclothes, sheets and blankets)? 1   Sitting down on and standing up from a chair with arms (e.g., wheelchair, bedside commode, etc.) 1   Moving from lying on back to sitting on the side of the bed? 1   How much help from another person does the patient currently need...   Moving to and from a bed to a chair (including a wheelchair)? 1   Need to walk in a hospital room? 1   Climbing 3-5 steps with a railing? 1   6 clicks Mobility Score 6   Short Term Goals    Short Term Goal # 1 pt will roll R/L with mod A in 6 visits to work towards improved sup > sit   Goal Outcome # 1 goal not met   Short Term Goal # 2 pt will move supine <> sit with mod A in 6 visits for improved independence   Goal Outcome # 2 Goal not met   Short Term Goal # 3 pt will sit EOB 5 min with fair - balance for improved independence   Goal Outcome # 3 Goal not met   Short Term Goal # 4 pt will perform seated SB xfr to WC with mod A in 6 visits for improved OOB mobility   Goal Outcome # 4 Goal not met
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
## 2021-04-07 NOTE — CARE PLAN
Problem: Communication  Goal: The ability to communicate needs accurately and effectively will improve  Outcome: PROGRESSING AS EXPECTED     Problem: Safety  Goal: Will remain free from falls  Outcome: PROGRESSING AS EXPECTED  Note: Bed alarm on. Bed in lowest, locked position. Call light and belongings in reach.      Problem: Infection  Goal: Will remain free from infection  Outcome: PROGRESSING AS EXPECTED     Problem: Venous Thromboembolism (VTW)/Deep Vein Thrombosis (DVT) Prevention:  Goal: Patient will participate in Venous Thrombosis (VTE)/Deep Vein Thrombosis (DVT)Prevention Measures  Outcome: PROGRESSING AS EXPECTED  Note: SCDs in place. ROM performed. Lovenox ordered.      Problem: Bowel/Gastric:  Goal: Will not experience complications related to bowel motility  Outcome: PROGRESSING AS EXPECTED  Note: Suppository with dig stim provided.      Problem: Knowledge Deficit  Goal: Knowledge of disease process/condition, treatment plan, diagnostic tests, and medications will improve  Outcome: PROGRESSING AS EXPECTED     Problem: Discharge Barriers/Planning  Goal: Patient's continuum of care needs will be met  Outcome: PROGRESSING AS EXPECTED     Problem: Respiratory:  Goal: Respiratory status will improve  Outcome: PROGRESSING AS EXPECTED     Problem: Pain Management  Goal: Pain level will decrease to patient's comfort goal  Outcome: PROGRESSING AS EXPECTED     Problem: Skin Integrity  Goal: Risk for impaired skin integrity will decrease  Outcome: PROGRESSING AS EXPECTED  Note: Mepilex placed on sacrum and under C-collar on posterior shoulder.

## 2021-04-07 NOTE — WOUND TEAM
Wound consult placed on 4/4/21 regarding buttocks/sacrococcygeal area. Chart and images reviewed. This RN in to assess patient in S184/01. Family and bedside RN at bedside. Per bedside RN consult not necessary at this time, area is red but blanching. All preventative measures appear to be in place. Wound consult completed. NO further follow up at this time unless indicated and consulted.

## 2021-04-08 LAB
BASOPHILS # BLD AUTO: 0.4 % (ref 0–1.8)
BASOPHILS # BLD: 0.03 K/UL (ref 0–0.12)
EOSINOPHIL # BLD AUTO: 0.13 K/UL (ref 0–0.51)
EOSINOPHIL NFR BLD: 1.8 % (ref 0–6.9)
ERYTHROCYTE [DISTWIDTH] IN BLOOD BY AUTOMATED COUNT: 41.7 FL (ref 35.9–50)
HCT VFR BLD AUTO: 35.5 % (ref 42–52)
HGB BLD-MCNC: 12.2 G/DL (ref 14–18)
IMM GRANULOCYTES # BLD AUTO: 0.08 K/UL (ref 0–0.11)
IMM GRANULOCYTES NFR BLD AUTO: 1.1 % (ref 0–0.9)
LYMPHOCYTES # BLD AUTO: 1.85 K/UL (ref 1–4.8)
LYMPHOCYTES NFR BLD: 25.2 % (ref 22–41)
MAGNESIUM SERPL-MCNC: 2.3 MG/DL (ref 1.5–2.5)
MCH RBC QN AUTO: 30.7 PG (ref 27–33)
MCHC RBC AUTO-ENTMCNC: 34.4 G/DL (ref 33.7–35.3)
MCV RBC AUTO: 89.4 FL (ref 81.4–97.8)
MONOCYTES # BLD AUTO: 0.73 K/UL (ref 0–0.85)
MONOCYTES NFR BLD AUTO: 10 % (ref 0–13.4)
NEUTROPHILS # BLD AUTO: 4.51 K/UL (ref 1.82–7.42)
NEUTROPHILS NFR BLD: 61.5 % (ref 44–72)
NRBC # BLD AUTO: 0 K/UL
NRBC BLD-RTO: 0 /100 WBC
PHOSPHATE SERPL-MCNC: 4.2 MG/DL (ref 2.5–4.5)
PLATELET # BLD AUTO: 394 K/UL (ref 164–446)
PMV BLD AUTO: 9.5 FL (ref 9–12.9)
RBC # BLD AUTO: 3.97 M/UL (ref 4.7–6.1)
WBC # BLD AUTO: 7.3 K/UL (ref 4.8–10.8)

## 2021-04-08 PROCEDURE — 700102 HCHG RX REV CODE 250 W/ 637 OVERRIDE(OP): Performed by: PHYSICAL MEDICINE & REHABILITATION

## 2021-04-08 PROCEDURE — 97112 NEUROMUSCULAR REEDUCATION: CPT | Mod: CQ

## 2021-04-08 PROCEDURE — 700102 HCHG RX REV CODE 250 W/ 637 OVERRIDE(OP): Performed by: SURGERY

## 2021-04-08 PROCEDURE — 84100 ASSAY OF PHOSPHORUS: CPT

## 2021-04-08 PROCEDURE — 700102 HCHG RX REV CODE 250 W/ 637 OVERRIDE(OP): Performed by: PHYSICIAN ASSISTANT

## 2021-04-08 PROCEDURE — A9270 NON-COVERED ITEM OR SERVICE: HCPCS | Performed by: PHYSICAL MEDICINE & REHABILITATION

## 2021-04-08 PROCEDURE — A9270 NON-COVERED ITEM OR SERVICE: HCPCS | Performed by: SURGERY

## 2021-04-08 PROCEDURE — 83735 ASSAY OF MAGNESIUM: CPT

## 2021-04-08 PROCEDURE — 770006 HCHG ROOM/CARE - MED/SURG/GYN SEMI*

## 2021-04-08 PROCEDURE — 97530 THERAPEUTIC ACTIVITIES: CPT | Mod: CQ

## 2021-04-08 PROCEDURE — A9270 NON-COVERED ITEM OR SERVICE: HCPCS | Performed by: PHYSICIAN ASSISTANT

## 2021-04-08 PROCEDURE — 99233 SBSQ HOSP IP/OBS HIGH 50: CPT | Performed by: PHYSICAL MEDICINE & REHABILITATION

## 2021-04-08 PROCEDURE — 97110 THERAPEUTIC EXERCISES: CPT | Mod: CQ

## 2021-04-08 PROCEDURE — 85025 COMPLETE CBC W/AUTO DIFF WBC: CPT

## 2021-04-08 PROCEDURE — 700111 HCHG RX REV CODE 636 W/ 250 OVERRIDE (IP): Performed by: SURGERY

## 2021-04-08 PROCEDURE — 36415 COLL VENOUS BLD VENIPUNCTURE: CPT

## 2021-04-08 RX ADMIN — CALCIUM CARBONATE 500 MG: 500 TABLET, CHEWABLE ORAL at 17:52

## 2021-04-08 RX ADMIN — GUAIFENESIN 600 MG: 600 TABLET, EXTENDED RELEASE ORAL at 05:49

## 2021-04-08 RX ADMIN — MIDODRINE HYDROCHLORIDE 10 MG: 5 TABLET ORAL at 13:44

## 2021-04-08 RX ADMIN — GABAPENTIN 600 MG: 300 CAPSULE ORAL at 09:00

## 2021-04-08 RX ADMIN — GUAIFENESIN 600 MG: 600 TABLET, EXTENDED RELEASE ORAL at 17:52

## 2021-04-08 RX ADMIN — CALCIUM CARBONATE 500 MG: 500 TABLET, CHEWABLE ORAL at 05:49

## 2021-04-08 RX ADMIN — ENOXAPARIN SODIUM 30 MG: 30 INJECTION SUBCUTANEOUS at 05:49

## 2021-04-08 RX ADMIN — GABAPENTIN 600 MG: 300 CAPSULE ORAL at 13:44

## 2021-04-08 RX ADMIN — GABAPENTIN 600 MG: 300 CAPSULE ORAL at 21:21

## 2021-04-08 RX ADMIN — POLYETHYLENE GLYCOL 3350 1 PACKET: 17 POWDER, FOR SOLUTION ORAL at 05:49

## 2021-04-08 RX ADMIN — GABAPENTIN 600 MG: 300 CAPSULE ORAL at 17:52

## 2021-04-08 RX ADMIN — METHOCARBAMOL 750 MG: 750 TABLET ORAL at 11:31

## 2021-04-08 RX ADMIN — BACLOFEN 10 MG: 10 TABLET ORAL at 13:44

## 2021-04-08 RX ADMIN — MIDODRINE HYDROCHLORIDE 10 MG: 5 TABLET ORAL at 21:21

## 2021-04-08 RX ADMIN — MAGNESIUM HYDROXIDE 30 ML: 400 SUSPENSION ORAL at 05:49

## 2021-04-08 RX ADMIN — ACETAMINOPHEN 1000 MG: 500 TABLET, FILM COATED ORAL at 11:31

## 2021-04-08 RX ADMIN — DOCUSATE SODIUM 100 MG: 100 CAPSULE, LIQUID FILLED ORAL at 05:49

## 2021-04-08 RX ADMIN — OXYCODONE 5 MG: 5 TABLET ORAL at 22:30

## 2021-04-08 RX ADMIN — ENOXAPARIN SODIUM 30 MG: 30 INJECTION SUBCUTANEOUS at 17:53

## 2021-04-08 RX ADMIN — MIDODRINE HYDROCHLORIDE 10 MG: 5 TABLET ORAL at 05:49

## 2021-04-08 RX ADMIN — Medication 5000 UNITS: at 05:49

## 2021-04-08 RX ADMIN — BACLOFEN 10 MG: 10 TABLET ORAL at 17:53

## 2021-04-08 RX ADMIN — BACLOFEN 10 MG: 10 TABLET ORAL at 05:49

## 2021-04-08 ASSESSMENT — ENCOUNTER SYMPTOMS
WEAKNESS: 1
SENSORY CHANGE: 1
PSYCHIATRIC NEGATIVE: 1
MYALGIAS: 1
ROS GI COMMENTS: BM 4/7
RESPIRATORY NEGATIVE: 1

## 2021-04-08 ASSESSMENT — COGNITIVE AND FUNCTIONAL STATUS - GENERAL
MOVING TO AND FROM BED TO CHAIR: UNABLE
TURNING FROM BACK TO SIDE WHILE IN FLAT BAD: UNABLE
STANDING UP FROM CHAIR USING ARMS: TOTAL
SUGGESTED CMS G CODE MODIFIER MOBILITY: CN
MOBILITY SCORE: 6
CLIMB 3 TO 5 STEPS WITH RAILING: TOTAL
WALKING IN HOSPITAL ROOM: TOTAL
MOVING FROM LYING ON BACK TO SITTING ON SIDE OF FLAT BED: UNABLE

## 2021-04-08 ASSESSMENT — PAIN DESCRIPTION - PAIN TYPE
TYPE: ACUTE PAIN
TYPE: ACUTE PAIN

## 2021-04-08 ASSESSMENT — GAIT ASSESSMENTS: GAIT LEVEL OF ASSIST: UNABLE TO PARTICIPATE

## 2021-04-08 NOTE — DISCHARGE PLANNING
Received message from Jana from [employer] . She suggested referral to [facility] in [place], [place]. Phone # is .

## 2021-04-08 NOTE — CARE PLAN
Problem: Venous Thromboembolism (VTW)/Deep Vein Thrombosis (DVT) Prevention:  Goal: Patient will participate in Venous Thrombosis (VTE)/Deep Vein Thrombosis (DVT)Prevention Measures  Outcome: PROGRESSING AS EXPECTED  Pt educated on importance of SCDs/ROM to prevent DVT/VTEs. Pt verbalizes understanding. SCDs in place. Pt ambulates occasionally and participates in ROM.     Problem: Skin Integrity  Goal: Risk for impaired skin integrity will decrease  Outcome: PROGRESSING AS EXPECTED  Pt educated on importance of Q2H turns to maintain skin integrity r/t deficits. Pt verbalizes understanding. Q2H turns in place. Low air loss mattress, indwelling cath, TAPs system, mepilex, barrier cream/wipes, and pillows for support/repositioning present.

## 2021-04-08 NOTE — CARE PLAN
Problem: Communication  Goal: The ability to communicate needs accurately and effectively will improve  Outcome: PROGRESSING AS EXPECTED  Effectively communicates needs with staff     Problem: Safety  Goal: Will remain free from injury  Outcome: PROGRESSING AS EXPECTED  Free from injury/fall; precautions in place     Problem: Infection  Goal: Will remain free from infection  Outcome: PROGRESSING AS EXPECTED  Free from signs of infection     Problem: Pain Management  Goal: Pain level will decrease to patient's comfort goal  Outcome: PROGRESSING AS EXPECTED  Denies pain/need for PRN pain medication     Problem: Skin Integrity  Goal: Risk for impaired skin integrity will decrease  Outcome: PROGRESSING AS EXPECTED  TAPS system with Q2H turn in place, mepilex to bony prominences, wound consult in place

## 2021-04-08 NOTE — DISCHARGE PLANNING
ADDENDUM  4/9/21 @1500  Lsw faxed updated progress notes from nithin and PT to Jaquelin with [place].    Care Transition Team Discharge Planning    Anticipated Discharge Disposition: DC to medical acute rehab facilty in [facility].     Action: Lsw received phone call from carlos Acosta from [employer] insurance ([phone]). She reported speaking with Jana with [facility] would not give authorization for [facility]  Acute Rehab. However, Jana did authorize for the patient to go to 1) [facility] or 2) [facility]. Authorization number is [id number]    Lsw contacted [facility] and spoke with Jaquelin ([phone]).  Lsw spoke with Tato with [facility].     Lsw contacted faxed referral packet to [phone] attn.: Jaquelin  Lsw faxed referral packet to [facility] at [phone] attn.: Tato Owens also met with patient and Peg to inform them of the referrals made. Lsw had spoken with mother (Sariah) who agreed to the submission of both referrals.    Barriers to Discharge: Authorization from insurance provider for the patient to DC to an acute rehab in [facility].    Plan: Lsw will assist medical team with DC planning.

## 2021-04-08 NOTE — THERAPY
"Occupational Therapy  Daily Treatment     Patient Name: Enrique Blackmon  Age:  25 y.o., Sex:  male  Medical Record #: [id number]  Today's Date: 4/7/2021     Precautions  Precautions: Fall Risk, Cervical Collar  , Spinal / Back Precautions   Comments: ASA B C5, collar on AAT    Assessment    Pt seen for OT tx today, making steady progress with set POC, pt's mother involved in care, receptive and eager for training to assist pt. Pt continues to make ROM gain, R>L, initiated training with universal cuff for self-feeding task, able to pronate and elbow flexion mid range and then required assist to complete hand to mouth with simulated task. Pt and mother provided with \"Yes, you can\" and demonstrated and trained on BUE there-ex, mother able to demonstrate back adequately. Will continue to follow while in house, highly recommend post acute placement given pt's motivation, good family support and progress he's making with therapy.     Plan    Continue current treatment plan.    DC Equipment Recommendations: Unable to determine at this time  Discharge Recommendations: Recommend post-acute placement for additional occupational therapy services prior to discharge home     Objective       04/07/21 1617   Cognition    Cognition / Consciousness WDL   Level of Consciousness Alert   Comments Pt and mother very receptive of education and training    Supine Upper Body Exercises   Supine Upper Body Exercises Yes   Comments BUE shoulder and elbow ROM in available plane. Pt's mother educated and trained on supporting at key joints during exercises   Other Treatments   Other Treatments Provided educated and intiated training in universal cuff for self feeding task prepration. Able to bring elbow flexion group home and then requires assist to complete full hand to mouth ROM   Activities of Daily Living   Eating Maximal Assist  (simulated with use of universal cuff)   Functional Mobility   Comments session focused in bed and on second session up " in cardiac chair   Short Term Goals   Short Term Goal # 1 min A to self feed with Ucuff    Goal Outcome # 1 Progressing slower than expected   Short Term Goal # 2 min A with UB dressing   Goal Outcome # 2 Goal not met   Anticipated Discharge Equipment and Recommendations   DC Equipment Recommendations Unable to determine at this time
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
## 2021-04-09 PROCEDURE — 770001 HCHG ROOM/CARE - MED/SURG/GYN PRIV*

## 2021-04-09 PROCEDURE — A9270 NON-COVERED ITEM OR SERVICE: HCPCS | Performed by: SURGERY

## 2021-04-09 PROCEDURE — 700101 HCHG RX REV CODE 250: Performed by: NURSE PRACTITIONER

## 2021-04-09 PROCEDURE — 97530 THERAPEUTIC ACTIVITIES: CPT | Mod: CQ

## 2021-04-09 PROCEDURE — 700102 HCHG RX REV CODE 250 W/ 637 OVERRIDE(OP): Performed by: SURGERY

## 2021-04-09 PROCEDURE — A9270 NON-COVERED ITEM OR SERVICE: HCPCS | Performed by: PHYSICAL MEDICINE & REHABILITATION

## 2021-04-09 PROCEDURE — 700111 HCHG RX REV CODE 636 W/ 250 OVERRIDE (IP): Performed by: SURGERY

## 2021-04-09 PROCEDURE — 700102 HCHG RX REV CODE 250 W/ 637 OVERRIDE(OP): Performed by: PHYSICIAN ASSISTANT

## 2021-04-09 PROCEDURE — 97760 ORTHOTIC MGMT&TRAING 1ST ENC: CPT

## 2021-04-09 PROCEDURE — 99233 SBSQ HOSP IP/OBS HIGH 50: CPT | Performed by: PHYSICAL MEDICINE & REHABILITATION

## 2021-04-09 PROCEDURE — 97112 NEUROMUSCULAR REEDUCATION: CPT | Mod: CQ

## 2021-04-09 PROCEDURE — 700102 HCHG RX REV CODE 250 W/ 637 OVERRIDE(OP): Performed by: PHYSICAL MEDICINE & REHABILITATION

## 2021-04-09 PROCEDURE — 97110 THERAPEUTIC EXERCISES: CPT | Mod: CQ

## 2021-04-09 PROCEDURE — A9270 NON-COVERED ITEM OR SERVICE: HCPCS | Performed by: PHYSICIAN ASSISTANT

## 2021-04-09 RX ORDER — LIDOCAINE 50 MG/G
1 PATCH TOPICAL EVERY 24 HOURS
Status: DISCONTINUED | OUTPATIENT
Start: 2021-04-09 | End: 2021-04-14 | Stop reason: HOSPADM

## 2021-04-09 RX ADMIN — ENOXAPARIN SODIUM 30 MG: 30 INJECTION SUBCUTANEOUS at 05:12

## 2021-04-09 RX ADMIN — MIDODRINE HYDROCHLORIDE 10 MG: 5 TABLET ORAL at 13:39

## 2021-04-09 RX ADMIN — ENOXAPARIN SODIUM 30 MG: 30 INJECTION SUBCUTANEOUS at 18:51

## 2021-04-09 RX ADMIN — BACLOFEN 10 MG: 10 TABLET ORAL at 18:50

## 2021-04-09 RX ADMIN — LIDOCAINE 1 PATCH: 50 PATCH TOPICAL at 13:39

## 2021-04-09 RX ADMIN — Medication 5000 UNITS: at 05:13

## 2021-04-09 RX ADMIN — MIDODRINE HYDROCHLORIDE 10 MG: 5 TABLET ORAL at 21:16

## 2021-04-09 RX ADMIN — CALCIUM CARBONATE 500 MG: 500 TABLET, CHEWABLE ORAL at 18:50

## 2021-04-09 RX ADMIN — GABAPENTIN 600 MG: 300 CAPSULE ORAL at 18:56

## 2021-04-09 RX ADMIN — GABAPENTIN 600 MG: 300 CAPSULE ORAL at 21:16

## 2021-04-09 RX ADMIN — GUAIFENESIN 600 MG: 600 TABLET, EXTENDED RELEASE ORAL at 18:50

## 2021-04-09 RX ADMIN — OXYCODONE 5 MG: 5 TABLET ORAL at 09:57

## 2021-04-09 RX ADMIN — GABAPENTIN 600 MG: 300 CAPSULE ORAL at 07:46

## 2021-04-09 RX ADMIN — BACLOFEN 10 MG: 10 TABLET ORAL at 13:39

## 2021-04-09 RX ADMIN — CALCIUM CARBONATE 500 MG: 500 TABLET, CHEWABLE ORAL at 05:12

## 2021-04-09 RX ADMIN — GUAIFENESIN 600 MG: 600 TABLET, EXTENDED RELEASE ORAL at 05:12

## 2021-04-09 RX ADMIN — GABAPENTIN 600 MG: 300 CAPSULE ORAL at 13:39

## 2021-04-09 RX ADMIN — BACLOFEN 10 MG: 10 TABLET ORAL at 05:11

## 2021-04-09 RX ADMIN — ACETAMINOPHEN 1000 MG: 500 TABLET, FILM COATED ORAL at 07:46

## 2021-04-09 RX ADMIN — MIDODRINE HYDROCHLORIDE 10 MG: 5 TABLET ORAL at 05:13

## 2021-04-09 ASSESSMENT — COGNITIVE AND FUNCTIONAL STATUS - GENERAL
TOILETING: TOTAL
SUGGESTED CMS G CODE MODIFIER MOBILITY: CN
MOBILITY SCORE: 6
MOVING TO AND FROM BED TO CHAIR: UNABLE
DRESSING REGULAR LOWER BODY CLOTHING: TOTAL
DRESSING REGULAR UPPER BODY CLOTHING: TOTAL
EATING MEALS: TOTAL
TURNING FROM BACK TO SIDE WHILE IN FLAT BAD: UNABLE
STANDING UP FROM CHAIR USING ARMS: TOTAL
PERSONAL GROOMING: TOTAL
DAILY ACTIVITIY SCORE: 6
WALKING IN HOSPITAL ROOM: TOTAL
HELP NEEDED FOR BATHING: TOTAL
SUGGESTED CMS G CODE MODIFIER DAILY ACTIVITY: CN
MOVING FROM LYING ON BACK TO SITTING ON SIDE OF FLAT BED: UNABLE
CLIMB 3 TO 5 STEPS WITH RAILING: TOTAL

## 2021-04-09 ASSESSMENT — PAIN DESCRIPTION - PAIN TYPE
TYPE: ACUTE PAIN

## 2021-04-09 ASSESSMENT — GAIT ASSESSMENTS: GAIT LEVEL OF ASSIST: UNABLE TO PARTICIPATE

## 2021-04-09 NOTE — DISCHARGE PLANNING
Jana ([facility] contact numbers) [phone] Option 1 or [phone].    ADDENDUM 4/9/21 1242  Auth # for Ground Ambulance is [id number]    ADDENDUM 4/9/21 0932  Graciela received a phone call from Parul who stated that she spoke with Jana ([employer]). Parul stated that [facility] is not a provider that [employer] contracts with. Humphreyw informed Parul that the paperwork was already sent.    Parul also stated that [facility] is also not a provider that contracts with the insurance.     Parul also reported that she will give the authorization for ground transportation (ambulance) not air transportation. Therefore when a acute rehab provider has accepted the patient, Jana will provide the auth number.    Humphreyw contacted [facility] at [phone] and spoke with Jaylen who stated the referral was declined due to the patient requiring a higher level of care. Lsw reported the information to Parul.     Per Parul Bernabe [facility] will reconsider the acceptance of the patient over the weekend.    Humphreyw informed the family of the updates.  Care Transition Team Discharge Planning    Anticipated Discharge Disposition: DC to acute rehab pending.    Action: Humphreyw received phone call from Parul ([facility]) who requested that this Lsw submit a referral packet to [facility].    Humphreyw faxed the clinicals and face sheet to [facility] intake line at [phone].    Barriers to Discharge: Awaiting acute rehab acceptance    Plan: Graciela is working with medical team and insurance provider to assist with DC planning.

## 2021-04-09 NOTE — THERAPY
Physical Therapy   Daily Treatment     Patient Name: Enrique Blackmon  Age:  25 y.o., Sex:  male  Medical Record #: [id number]  Today's Date: 4/9/2021     Precautions: (P) Fall Risk, Cervical Collar  , Spinal / Back Precautions     Assessment    Pt medicated prior to PT session, allowing for improvement w/long sitting and EOB tolerance. Pt was able to initiate use of bilat UE's w/EOB propping. Ongoing education regarding positioning and directing his care. Highly recommend acute SCI rehab program to get pt functionally indep w/his level of injury    Plan    Continue current treatment plan.    DC Equipment Recommendations: Unable to determine at this time  Discharge Recommendations: Recommend post-acute placement for additional physical therapy services prior to discharge home     Objective       04/09/21 1135   Other Treatments   Other Treatments Provided Bilat UE PNF D1 and D2 w/pt supine. PROM/HC stretch bilat LE's while donning ace wraps/pj bottoms. Pericare provided, pt stated he is having multiple BM's nrsg notified. EOB w/UE support and cueing for trunk positioning. Pt beginning to engage UE's to initiate static sit. Hot pack provided to Lft scap area while seated in cardiac chair, pt c/o trigger point area causing pain. Nrsg notified and asked for Lidocaine patch. Educated pt, mom, and nrsg about getting pt off his buttocks/sacrum. The wedge cushion is not getting pt turned enough, demo'd to pt and mom. Removed Mepilex off his heels/elbows for skin checks. Nrsg notified pt had reddened area on Rt HC area. Mom and pt educated on AD, encouraged mom to reach chapter in Yes You Can book on AD.    Balance   Sitting Balance (Static) Trace   Sitting Balance (Dynamic) Dependent   Gait Analysis   Gait Level Of Assist Unable to Participate   Bed Mobility    Supine to Sit Total Assist   Sit to Supine   (pt left seated in cardiac chair)   Scooting Total Assist   Rolling Maximum Assist to Lt.;Maximal Assist to Rt.   Skilled  Intervention Verbal Cuing;Postural Facilitation;Compensatory Strategies   Comments HOB elevated->long sit working on having pt prop on elbows for shoulder/scap strengthening. Pt tolerated longer today, still unable to hold. Rolling to michael pj bottoms and abdominal binder.    Functional Mobility   Sit to Stand Unable to Participate   Bed, Chair, Wheelchair Transfer Total Assist  (bed->cardiac chair)   Transfer Method Slide Board  (seated)   Skilled Intervention Verbal Cuing;Postural Facilitation;Compensatory Strategies   Comments Improvement w/tolerating his SB transfer today to the cardiac chair. Pt cued to initiate pushing w/Rt UE during the transfer. Pt seated on ROHO cushion in cardiac chair w/2 hr limit.    How much difficulty does the patient currently have...   Turning over in bed (including adjusting bedclothes, sheets and blankets)? 1   Sitting down on and standing up from a chair with arms (e.g., wheelchair, bedside commode, etc.) 1   Moving from lying on back to sitting on the side of the bed? 1   How much help from another person does the patient currently need...   Moving to and from a bed to a chair (including a wheelchair)? 1   Need to walk in a hospital room? 1   Climbing 3-5 steps with a railing? 1   6 clicks Mobility Score 6   Short Term Goals    Short Term Goal # 1 pt will roll R/L with mod A in 6 visits to work towards improved sup > sit   Goal Outcome # 1 goal not met   Short Term Goal # 2 pt will move supine <> sit with mod A in 6 visits for improved independence   Goal Outcome # 2 Goal not met   Short Term Goal # 3 pt will sit EOB 5 min with fair - balance for improved independence   Goal Outcome # 3 Goal not met   Short Term Goal # 4 pt will perform seated SB xfr to WC with mod A in 6 visits for improved OOB mobility   Goal Outcome # 4 Goal not met
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
## 2021-04-09 NOTE — CONSULTS
BRIEF PSYCHOLOGY CONSULT NOTE: Consult received. Patient will be seen when able. Thank you for the consult.

## 2021-04-09 NOTE — THERAPY
Physical Therapy   Daily Treatment     Patient Name: Enrique Blackmon  Age:  25 y.o., Sex:  male  Medical Record #: [id number]  Today's Date: 4/9/2021     Precautions: Fall Risk, Cervical Collar  , Spinal / Back Precautions     Assessment    Pt eager to participate w/PT, still declining pain meds prior to PT session. Initiated long sit today and seated SB transfer to the cardiac chair. Overall tx efforts were limited by pain. Pt demo improved Rt UE strength, still no tenodesis but does have pronation/supination. Lft UE weaker. Flaccid LE's.     Plan    Continue current treatment plan.    DC Equipment Recommendations: Unable to determine at this time  Discharge Recommendations: Recommend post-acute placement for additional physical therapy services prior to discharge home     Objective       04/08/21 1138   Other Treatments   Other Treatments Provided PNF D1/D2 w/bilat UE's and PROM/HC stretch bilat LE's. EOB x 8 mins w/total assist, initiated seated SB transfer to cardiac chair. EOB limited by c/o CS pain, pt declined pain meds prior to PT. Discussion regarding using the pain medications to allow for more constructive PT session.    Balance   Sitting Balance (Static) Dependent   Sitting Balance (Dynamic) Dependent   Gait Analysis   Gait Level Of Assist Unable to Participate   Bed Mobility    Supine to Sit Total Assist   Sit to Supine Total Assist   Scooting Total Assist   Rolling Maximal Assist to Rt.;Maximum Assist to Lt.   Comments Pt brought to long sit from HOB elevated position w/max x 2 assist. Again limited by CS pain.    Functional Mobility   Sit to Stand Unable to Participate   Bed, Chair, Wheelchair Transfer Total Assist  (bed->cardiac chair seated on ROHO cushion)   Transfer Method Slide Board  (seated)   Comments Initaited seated SB transfer today w/total assist.    How much difficulty does the patient currently have...   Turning over in bed (including adjusting bedclothes, sheets and blankets)? 1   Sitting  down on and standing up from a chair with arms (e.g., wheelchair, bedside commode, etc.) 1   Moving from lying on back to sitting on the side of the bed? 1   How much help from another person does the patient currently need...   Moving to and from a bed to a chair (including a wheelchair)? 1   Need to walk in a hospital room? 1   Climbing 3-5 steps with a railing? 1   6 clicks Mobility Score 6   Short Term Goals    Short Term Goal # 1 pt will roll R/L with mod A in 6 visits to work towards improved sup > sit   Goal Outcome # 1 goal not met   Short Term Goal # 2 pt will move supine <> sit with mod A in 6 visits for improved independence   Goal Outcome # 2 Goal not met   Short Term Goal # 3 pt will sit EOB 5 min with fair - balance for improved independence   Goal Outcome # 3 Goal not met   Short Term Goal # 4 pt will perform seated SB xfr to WC with mod A in 6 visits for improved OOB mobility   Goal Outcome # 4 Goal not met

## 2021-04-09 NOTE — PROGRESS NOTES
0900: Mother Josephine at bedside, updated on pt condition and plan of care, all questions answered at this time.

## 2021-04-09 NOTE — THERAPY
Occupational Therapy  Daily Treatment     Patient Name: Enrique Blackmon  Age:  25 y.o., Sex:  male  Medical Record #: [id number]  Today's Date: 4/9/2021     Precautions  Precautions: Fall Risk, Cervical Collar  , Spinal / Back Precautions   Comments: C5 LINA B, collar on AAT    Assessment    Created custom thermoplast long opponens orthotic for RUE day use. Educated pt on use of splint to promote improved tenodesis function. Pt and his mother educated on wear schedule and to check for skin breakdown. Checked with pt one hour after splint was donned and pt had no c/o pain. There was no signs of redness, irritation, or skin breakdown from splint. Pt also provided with bilateral wrist braces for nighttime use.     Plan    Continue current treatment plan.    DC Equipment Recommendations: Unable to determine at this time  Discharge Recommendations: Recommend post-acute placement for additional occupational therapy services prior to discharge home    Subjective    Pt alert, pleasant, and cooperative. Pt c/o L shoulder pain.      Objective       04/09/21 1315   Cognition    Cognition / Consciousness WDL   Level of Consciousness Alert   Comments pleasant, cooperative   Other Treatments   Other Treatments Provided Created custom thermoplast long opponens orthotic for RUE day use. Educated pt on use of splint to promote improved tenodesis function. Pt and his mother educated on wear schedule and to check for skin breakdown. Checked with pt one hour after splint was donned and pt had no c/o pain. There was no signs of redness, irritation, or skin breakdown from splint.   Patient / Family Goals   Patient / Family Goal #1 to return home   Short Term Goals   Short Term Goal # 1 min A to self feed with Ucuff    Goal Outcome # 1 Progressing slower than expected   Short Term Goal # 2 min A with UB dressing   Goal Outcome # 2 Goal not met

## 2021-04-09 NOTE — CARE PLAN
Problem: Communication  Goal: The ability to communicate needs accurately and effectively will improve  Outcome: PROGRESSING AS EXPECTED     Problem: Safety  Goal: Will remain free from injury  Outcome: PROGRESSING AS EXPECTED  Goal: Will remain free from falls  Outcome: PROGRESSING AS EXPECTED     Problem: Infection  Goal: Will remain free from infection  Outcome: PROGRESSING AS EXPECTED     Problem: Venous Thromboembolism (VTW)/Deep Vein Thrombosis (DVT) Prevention:  Goal: Patient will participate in Venous Thrombosis (VTE)/Deep Vein Thrombosis (DVT)Prevention Measures  Outcome: PROGRESSING AS EXPECTED     Problem: Knowledge Deficit  Goal: Knowledge of disease process/condition, treatment plan, diagnostic tests, and medications will improve  Outcome: PROGRESSING AS EXPECTED  Goal: Knowledge of the prescribed therapeutic regimen will improve  Outcome: PROGRESSING AS EXPECTED     Problem: Discharge Barriers/Planning  Goal: Patient's continuum of care needs will be met  Outcome: PROGRESSING AS EXPECTED     Problem: Respiratory:  Goal: Respiratory status will improve  Outcome: PROGRESSING AS EXPECTED     Problem: Pain Management  Goal: Pain level will decrease to patient's comfort goal  Outcome: PROGRESSING AS EXPECTED     Problem: Skin Integrity  Goal: Risk for impaired skin integrity will decrease  Outcome: PROGRESSING AS EXPECTED     Problem: Fluid Volume:  Goal: Will maintain balanced intake and output  Outcome: PROGRESSING AS EXPECTED     Problem: Mobility  Goal: Risk for activity intolerance will decrease  Outcome: PROGRESSING AS EXPECTED     Problem: Cervical Spine surgery  Goal: Post Op care of the Cervical Spine surgery patient  Outcome: PROGRESSING AS EXPECTED     Problem: Neuro Status  Goal: Monitor neuro status and rapid identification of neuro changes  Outcome: PROGRESSING AS EXPECTED     Problem: Hemodynamic Status  Goal: Vital Signs and Fluid Balance Management  Outcome: PROGRESSING AS EXPECTED      Problem: Bowel/Gastric:  Goal: Normal bowel function is maintained or improved  Outcome: PROGRESSING SLOWER THAN EXPECTED  Goal: Will not experience complications related to bowel motility  Outcome: PROGRESSING SLOWER THAN EXPECTED     Problem: Urinary Elimination:  Goal: Ability to reestablish a normal urinary elimination pattern will improve  Outcome: PROGRESSING SLOWER THAN EXPECTED
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
## 2021-04-10 PROCEDURE — A9270 NON-COVERED ITEM OR SERVICE: HCPCS | Performed by: PHYSICAL MEDICINE & REHABILITATION

## 2021-04-10 PROCEDURE — A9270 NON-COVERED ITEM OR SERVICE: HCPCS | Performed by: PHYSICIAN ASSISTANT

## 2021-04-10 PROCEDURE — 700111 HCHG RX REV CODE 636 W/ 250 OVERRIDE (IP): Performed by: SURGERY

## 2021-04-10 PROCEDURE — 700102 HCHG RX REV CODE 250 W/ 637 OVERRIDE(OP): Performed by: PHYSICAL MEDICINE & REHABILITATION

## 2021-04-10 PROCEDURE — A9270 NON-COVERED ITEM OR SERVICE: HCPCS | Performed by: SURGERY

## 2021-04-10 PROCEDURE — 770001 HCHG ROOM/CARE - MED/SURG/GYN PRIV*

## 2021-04-10 PROCEDURE — 700102 HCHG RX REV CODE 250 W/ 637 OVERRIDE(OP): Performed by: SURGERY

## 2021-04-10 PROCEDURE — 700102 HCHG RX REV CODE 250 W/ 637 OVERRIDE(OP): Performed by: PHYSICIAN ASSISTANT

## 2021-04-10 RX ADMIN — GUAIFENESIN 600 MG: 600 TABLET, EXTENDED RELEASE ORAL at 17:29

## 2021-04-10 RX ADMIN — CALCIUM CARBONATE 500 MG: 500 TABLET, CHEWABLE ORAL at 18:00

## 2021-04-10 RX ADMIN — MIDODRINE HYDROCHLORIDE 10 MG: 5 TABLET ORAL at 20:23

## 2021-04-10 RX ADMIN — BACLOFEN 10 MG: 10 TABLET ORAL at 04:09

## 2021-04-10 RX ADMIN — BACLOFEN 10 MG: 10 TABLET ORAL at 17:29

## 2021-04-10 RX ADMIN — ENOXAPARIN SODIUM 30 MG: 30 INJECTION SUBCUTANEOUS at 04:20

## 2021-04-10 RX ADMIN — GABAPENTIN 600 MG: 300 CAPSULE ORAL at 20:24

## 2021-04-10 RX ADMIN — CALCIUM CARBONATE 500 MG: 500 TABLET, CHEWABLE ORAL at 04:09

## 2021-04-10 RX ADMIN — GABAPENTIN 600 MG: 300 CAPSULE ORAL at 09:51

## 2021-04-10 RX ADMIN — GABAPENTIN 600 MG: 300 CAPSULE ORAL at 13:16

## 2021-04-10 RX ADMIN — GABAPENTIN 600 MG: 300 CAPSULE ORAL at 17:29

## 2021-04-10 RX ADMIN — BACLOFEN 10 MG: 10 TABLET ORAL at 13:16

## 2021-04-10 RX ADMIN — Medication 5000 UNITS: at 04:08

## 2021-04-10 RX ADMIN — MIDODRINE HYDROCHLORIDE 10 MG: 5 TABLET ORAL at 13:16

## 2021-04-10 RX ADMIN — GUAIFENESIN 600 MG: 600 TABLET, EXTENDED RELEASE ORAL at 04:09

## 2021-04-10 RX ADMIN — ENOXAPARIN SODIUM 30 MG: 30 INJECTION SUBCUTANEOUS at 17:29

## 2021-04-10 RX ADMIN — MIDODRINE HYDROCHLORIDE 10 MG: 5 TABLET ORAL at 04:09

## 2021-04-10 RX ADMIN — ACETAMINOPHEN 1000 MG: 500 TABLET, FILM COATED ORAL at 20:23

## 2021-04-10 ASSESSMENT — PAIN DESCRIPTION - PAIN TYPE: TYPE: ACUTE PAIN

## 2021-04-10 ASSESSMENT — ENCOUNTER SYMPTOMS
ROS GI COMMENTS: BM 4/9
RESPIRATORY NEGATIVE: 1
SENSORY CHANGE: 1
PSYCHIATRIC NEGATIVE: 1
WEAKNESS: 1
MYALGIAS: 1
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
## 2021-04-10 NOTE — CARE PLAN
Problem: Communication  Goal: The ability to communicate needs accurately and effectively will improve  Outcome: PROGRESSING AS EXPECTED  Note: Touch sensitive call button in use, pt communicates needs appropriately, hourly rounding in place to assess needs     Problem: Venous Thromboembolism (VTW)/Deep Vein Thrombosis (DVT) Prevention:  Goal: Patient will participate in Venous Thrombosis (VTE)/Deep Vein Thrombosis (DVT)Prevention Measures  Outcome: PROGRESSING AS EXPECTED  Flowsheets (Taken 4/9/2021 4418)  Mechanical Prophylaxis: SCDs, Sequential Compression Device  SCDs, Sequential Compression Device: On  Pharmacologic Prophylaxis Used: LMWH: Enoxaparin(Lovenox)     Problem: Skin Integrity  Goal: Risk for impaired skin integrity will decrease  Outcome: PROGRESSING AS EXPECTED  Note: Low air loss, mepilexes under C collar, pillows for support/positioning, barrier paste, barrier wipes, TAPS system, heel float boots, and Z flow in use. Q 2 turns in place.      Problem: Neuro Status  Goal: Monitor neuro status and rapid identification of neuro changes  Outcome: PROGRESSING AS EXPECTED

## 2021-04-10 NOTE — DISCHARGE PLANNING
note:  Updates given to mother Josephine Miguel upon her request.   She came to CM office.   Assisted Josephine with MY CHART access.

## 2021-04-10 NOTE — CARE PLAN
Problem: Communication  Goal: The ability to communicate needs accurately and effectively will improve  Outcome: PROGRESSING AS EXPECTED  Pt. alert and oriented x 4 at this time and able to communicate needs with nursing staff.     Problem: Safety  Goal: Will remain free from injury  Outcome: PROGRESSING AS EXPECTED  Bed alarm on. Soft touch call light in reach.

## 2021-04-11 PROCEDURE — A9270 NON-COVERED ITEM OR SERVICE: HCPCS | Performed by: SURGERY

## 2021-04-11 PROCEDURE — 700111 HCHG RX REV CODE 636 W/ 250 OVERRIDE (IP): Performed by: SURGERY

## 2021-04-11 PROCEDURE — 700102 HCHG RX REV CODE 250 W/ 637 OVERRIDE(OP): Performed by: PHYSICIAN ASSISTANT

## 2021-04-11 PROCEDURE — 700102 HCHG RX REV CODE 250 W/ 637 OVERRIDE(OP): Performed by: PHYSICAL MEDICINE & REHABILITATION

## 2021-04-11 PROCEDURE — 700102 HCHG RX REV CODE 250 W/ 637 OVERRIDE(OP): Performed by: SURGERY

## 2021-04-11 PROCEDURE — A9270 NON-COVERED ITEM OR SERVICE: HCPCS | Performed by: PHYSICIAN ASSISTANT

## 2021-04-11 PROCEDURE — 770001 HCHG ROOM/CARE - MED/SURG/GYN PRIV*

## 2021-04-11 PROCEDURE — A9270 NON-COVERED ITEM OR SERVICE: HCPCS | Performed by: PHYSICAL MEDICINE & REHABILITATION

## 2021-04-11 RX ADMIN — GABAPENTIN 600 MG: 300 CAPSULE ORAL at 17:45

## 2021-04-11 RX ADMIN — ENOXAPARIN SODIUM 30 MG: 30 INJECTION SUBCUTANEOUS at 17:46

## 2021-04-11 RX ADMIN — MINERAL OIL, PETROLATUM 1 APPLICATION: 425; 573 OINTMENT OPHTHALMIC at 11:40

## 2021-04-11 RX ADMIN — CALCIUM CARBONATE 500 MG: 500 TABLET, CHEWABLE ORAL at 04:36

## 2021-04-11 RX ADMIN — BACLOFEN 10 MG: 10 TABLET ORAL at 04:36

## 2021-04-11 RX ADMIN — MIDODRINE HYDROCHLORIDE 10 MG: 5 TABLET ORAL at 13:30

## 2021-04-11 RX ADMIN — GABAPENTIN 600 MG: 300 CAPSULE ORAL at 20:07

## 2021-04-11 RX ADMIN — MIDODRINE HYDROCHLORIDE 10 MG: 5 TABLET ORAL at 04:36

## 2021-04-11 RX ADMIN — GABAPENTIN 600 MG: 300 CAPSULE ORAL at 13:30

## 2021-04-11 RX ADMIN — Medication 5000 UNITS: at 04:35

## 2021-04-11 RX ADMIN — ENOXAPARIN SODIUM 30 MG: 30 INJECTION SUBCUTANEOUS at 04:35

## 2021-04-11 RX ADMIN — CALCIUM CARBONATE 500 MG: 500 TABLET, CHEWABLE ORAL at 17:45

## 2021-04-11 RX ADMIN — MIDODRINE HYDROCHLORIDE 10 MG: 5 TABLET ORAL at 20:07

## 2021-04-11 RX ADMIN — BISACODYL 10 MG: 10 SUPPOSITORY RECTAL at 17:46

## 2021-04-11 RX ADMIN — BACLOFEN 10 MG: 10 TABLET ORAL at 11:40

## 2021-04-11 RX ADMIN — GUAIFENESIN 600 MG: 600 TABLET, EXTENDED RELEASE ORAL at 04:36

## 2021-04-11 RX ADMIN — BACLOFEN 10 MG: 10 TABLET ORAL at 17:46

## 2021-04-11 RX ADMIN — DOCUSATE SODIUM 100 MG: 100 CAPSULE, LIQUID FILLED ORAL at 04:35

## 2021-04-11 RX ADMIN — GUAIFENESIN 600 MG: 600 TABLET, EXTENDED RELEASE ORAL at 17:45

## 2021-04-11 RX ADMIN — GABAPENTIN 600 MG: 300 CAPSULE ORAL at 09:27

## 2021-04-11 RX ADMIN — DOCUSATE SODIUM 100 MG: 100 CAPSULE, LIQUID FILLED ORAL at 17:45

## 2021-04-11 ASSESSMENT — ENCOUNTER SYMPTOMS
ROS GI COMMENTS: BM 4/10
PSYCHIATRIC NEGATIVE: 1
RESPIRATORY NEGATIVE: 1
WEAKNESS: 1
MYALGIAS: 1
SENSORY CHANGE: 1

## 2021-04-11 ASSESSMENT — FIBROSIS 4 INDEX: FIB4 SCORE: 0.31

## 2021-04-11 NOTE — CARE PLAN
Problem: Safety  Goal: Will remain free from injury  Outcome: PROGRESSING AS EXPECTED  Note: Call light in reach, bed locked and lowest position, family at bedside  Goal: Will remain free from falls  Outcome: PROGRESSING AS EXPECTED  Note: Call light in reach, bed locked and lowest position, family at bedside     Problem: Venous Thromboembolism (VTW)/Deep Vein Thrombosis (DVT) Prevention:  Goal: Patient will participate in Venous Thrombosis (VTE)/Deep Vein Thrombosis (DVT)Prevention Measures  Outcome: PROGRESSING AS EXPECTED  Flowsheets  Taken 4/11/2021 0959  Mechanical Prophylaxis: SCDs, Sequential Compression Device  SCDs, Sequential Compression Device: On  Taken 4/11/2021 0900  Pharmacologic Prophylaxis Used: LMWH: Enoxaparin(Lovenox)  Note: SCD's on, lovenox ordered

## 2021-04-11 NOTE — WOUND TEAM
[facility]ound [facility]  Inpatient Services  Initial Wound and Skin Care Evaluation    Admission Date: 3/28/2021     Last order of IP CONSULT TO WOUND CARE was found on 4/11/2021 from Hospital Encounter on 3/26/2021     HPI, PMH, SH: Reviewed    Past Surgical History:   Procedure Laterality Date   • CERVICAL FUSION POSTERIOR N/A 3/29/2021    Procedure: FUSION SPINE CERVICAL C4-C7 POSTERIOR APPROACH WITH EXTENSION TO THORACIC SPINE;  Surgeon: Maxx Aguilar M.D.;  Location: SURGERY [facility];  Service: Neurosurgery   • CERVICAL LAMINECTOMY POSTERIOR N/A 3/29/2021    Procedure: LAMINECTOMY SPINE CERVICAL POSTERIOR APPROACH C4-C7 WITH EXTENSION TO THORACIC SPINE;  Surgeon: Maxx Aguilar M.D.;  Location: SURGERY [facility];  Service: Neurosurgery     Social History     Tobacco Use   • Smoking status: Never Smoker   • Smokeless tobacco: Never Used   Substance Use Topics   • Alcohol use: Not on file     No chief complaint on file.    Diagnosis: Trauma [T14.90XA]    Unit where seen by Wound Team: S181/00     WOUND CONSULT/FOLLOW UP RELATED TO:  Sacrum     WOUND HISTORY:  25 y.o. male admitted 3/28/2021 with quadriplegia from spinal cord injury.  Transferred from SICU to neuroscience on 4/5/21,     WOUND ASSESSMENT/LDA      Wound 04/11/21 Sacrum DTI (Active)   Wound Image      Site Assessment Pink;Purple;Red    Periwound Assessment Pink    Margins Attached edges;Defined edges    Closure Secondary intention    Drainage Amount None    Treatments Cleansed;Site care    Wound Cleansing Foam Cleanser/Washcloth    Periwound Protectant Barrier Paste;Viscopaste    Dressing Cleansing/Solutions Not Applicable    Dressing Options Viscopaste    Dressing Changed New    Dressing Status Dry;Clean    Dressing Change/Treatment Frequency Every Shift, and As Needed    NEXT Dressing Change/Treatment Date 04/11/21    NEXT Weekly Photo (Inpatient Only) 04/16/21    Pressure Injury Stage DTPI    Wound Length (cm) 3.5 cm    Wound Width (cm)  2 cm    Wound Surface Area (cm^2) 7 cm^2    Shape circular    Wound Odor None    Pulses N/A    Exposed Structures None    WOUND NURSE ONLY - Time Spent with Patient (mins) 60    Number of days: 0        Vascular:    YONATHAN:   No results found.    Lab Values:    Lab Results   Component Value Date/Time    WBC 7.3 04/08/2021 04:46 AM    RBC 3.97 (L) 04/08/2021 04:46 AM    HEMOGLOBIN 12.2 (L) 04/08/2021 04:46 AM    HEMATOCRIT 35.5 (L) 04/08/2021 04:46 AM        Culture Results show:  No results found for this or any previous visit (from the past 720 hour(s)).    Pain Level/Medicated:  Denied pain       INTERVENTIONS BY WOUND TEAM:  Chart and images reviewed. Discussed with bedside RN. All areas of concern (based on picture review, LDA review and discussion with bedside RN) have been thoroughly assessed. Documentation of areas based on significant findings. This RN in to assess patient. Performed standard wound care which includes appropriate positioning, dressing removal and non-selective debridement. Pictures and measurements obtained weekly if/when required.  Preparation for Dressing removal: Dressing soaked with soap and water  Cleansed with:  foam cleanser and gauze.  Sharp debridement: n/a  Minda wound: Cleansed with foam cleanser, Prepped with n/a  Primary Dressing: barrier paste,  Secondary (Outer) Dressing: viscopaste    Interdisciplinary consultation: Patient, Bedside RN (), Wound RN Pennie    EVALUATION / RATIONALE FOR TREATMENT:  Most Recent Date:  4/11/2021: DTI to sacrum.  Area dark purple non blanching, measurements taken.  Interventions in place: low air loss mattress, barrier paste, pillows used for repositioning,Q2 turning, suggested using foam for TAPS system.       Goals: Steady decrease in wound area and depth weekly.    WOUND TEAM PLAN OF CARE ([X] for frequency of wound follow up,): X  Nursing to follow orders written for wound care. Contact wound team if area fails to progress, deteriorates or with any  questions/concerns  Dressing changes by wound team:                   Follow up 3 times weekly:                NPWT change 3 times weekly:     Follow up 1-2 times weekly:    X  Follow up Bi-Monthly:                   Follow up as needed:     Other (explain):     NURSING PLAN OF CARE ORDERS (X):  Dressing changes: See Dressing Care orders: n/a  Skin care: See Skin Care orders: X  RN Prevention Protocol: X  Rectal tube care: See Rectal Tube Care orders:   Other orders:    RSKIN:   CURRENTLY IN PLACE (X), APPLIED THIS VISIT (A), ORDERED (O):   Q shift Milton:  X  Q shift pressure point assessments:  X    Surface/Positioning   Pressure redistribution mattress            Low Airloss      X    Bariatric foam      Bariatric ANA     Waffle cushion        Waffle Overlay          Reposition q 2 hours  X    TAPs Turning system   A  Z Rob Pillow     Offloading/Redistribution   Sacral Mepilex (Silicone dressing)     Heel Mepilex (Silicone dressing)    O     Heel float boots (Prevalon boot)   X         Float Heels off Bed with Pillows           Respiratory room air  Silicone O2 tubing         Gray Foam Ear protectors     Cannula fixation Device (Tender )          High flow offloading Clip    Elastic head band offloading device      Anchorfast                                                         Trach with Optifoam split foam             Containment/Moisture Prevention X    Rectal tube or BMS    Purwick/Condom Cath    X  Campos Catheter    Barrier wipes        O   Barrier paste     A  Antifungal tx      Interdry        Mobilization kina      Up to chair        Ambulate      PT/OT      Nutrition X      Dietician        Diabetes Education      PO    X TF     TPN     NPO   # days     Other        Anticipated discharge plans: Pending discharge to Acute Rehab in [place] [facility]  LTACH:        SNF/Rehab:                  Home Health Care:           Outpatient Wound Center:            Self/Family Care:        Other:
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
## 2021-04-12 LAB
BASOPHILS # BLD AUTO: 0.4 % (ref 0–1.8)
BASOPHILS # BLD: 0.04 K/UL (ref 0–0.12)
EOSINOPHIL # BLD AUTO: 0.12 K/UL (ref 0–0.51)
EOSINOPHIL NFR BLD: 1.2 % (ref 0–6.9)
ERYTHROCYTE [DISTWIDTH] IN BLOOD BY AUTOMATED COUNT: 44 FL (ref 35.9–50)
HCT VFR BLD AUTO: 39 % (ref 42–52)
HGB BLD-MCNC: 13 G/DL (ref 14–18)
IMM GRANULOCYTES # BLD AUTO: 0.06 K/UL (ref 0–0.11)
IMM GRANULOCYTES NFR BLD AUTO: 0.6 % (ref 0–0.9)
LYMPHOCYTES # BLD AUTO: 2.82 K/UL (ref 1–4.8)
LYMPHOCYTES NFR BLD: 27.6 % (ref 22–41)
MCH RBC QN AUTO: 30.7 PG (ref 27–33)
MCHC RBC AUTO-ENTMCNC: 33.3 G/DL (ref 33.7–35.3)
MCV RBC AUTO: 92.2 FL (ref 81.4–97.8)
MONOCYTES # BLD AUTO: 0.99 K/UL (ref 0–0.85)
MONOCYTES NFR BLD AUTO: 9.7 % (ref 0–13.4)
NEUTROPHILS # BLD AUTO: 6.17 K/UL (ref 1.82–7.42)
NEUTROPHILS NFR BLD: 60.5 % (ref 44–72)
NRBC # BLD AUTO: 0 K/UL
NRBC BLD-RTO: 0 /100 WBC
PLATELET # BLD AUTO: 444 K/UL (ref 164–446)
PMV BLD AUTO: 9.2 FL (ref 9–12.9)
RBC # BLD AUTO: 4.23 M/UL (ref 4.7–6.1)
WBC # BLD AUTO: 10.2 K/UL (ref 4.8–10.8)

## 2021-04-12 PROCEDURE — A9270 NON-COVERED ITEM OR SERVICE: HCPCS | Performed by: PHYSICIAN ASSISTANT

## 2021-04-12 PROCEDURE — 97530 THERAPEUTIC ACTIVITIES: CPT | Mod: CQ

## 2021-04-12 PROCEDURE — 99233 SBSQ HOSP IP/OBS HIGH 50: CPT | Performed by: PHYSICAL MEDICINE & REHABILITATION

## 2021-04-12 PROCEDURE — 770001 HCHG ROOM/CARE - MED/SURG/GYN PRIV*

## 2021-04-12 PROCEDURE — 700102 HCHG RX REV CODE 250 W/ 637 OVERRIDE(OP): Performed by: SURGERY

## 2021-04-12 PROCEDURE — A9270 NON-COVERED ITEM OR SERVICE: HCPCS | Performed by: SURGERY

## 2021-04-12 PROCEDURE — A9270 NON-COVERED ITEM OR SERVICE: HCPCS | Performed by: PHYSICAL MEDICINE & REHABILITATION

## 2021-04-12 PROCEDURE — 700111 HCHG RX REV CODE 636 W/ 250 OVERRIDE (IP): Performed by: SURGERY

## 2021-04-12 PROCEDURE — 36415 COLL VENOUS BLD VENIPUNCTURE: CPT

## 2021-04-12 PROCEDURE — 90791 PSYCH DIAGNOSTIC EVALUATION: CPT | Performed by: PSYCHOLOGIST

## 2021-04-12 PROCEDURE — 700102 HCHG RX REV CODE 250 W/ 637 OVERRIDE(OP): Performed by: PHYSICAL MEDICINE & REHABILITATION

## 2021-04-12 PROCEDURE — 97112 NEUROMUSCULAR REEDUCATION: CPT | Mod: CQ

## 2021-04-12 PROCEDURE — 700102 HCHG RX REV CODE 250 W/ 637 OVERRIDE(OP): Performed by: PHYSICIAN ASSISTANT

## 2021-04-12 PROCEDURE — 85025 COMPLETE CBC W/AUTO DIFF WBC: CPT

## 2021-04-12 PROCEDURE — 97535 SELF CARE MNGMENT TRAINING: CPT

## 2021-04-12 PROCEDURE — 97110 THERAPEUTIC EXERCISES: CPT | Mod: CQ

## 2021-04-12 RX ADMIN — GUAIFENESIN 600 MG: 600 TABLET, EXTENDED RELEASE ORAL at 17:27

## 2021-04-12 RX ADMIN — MIDODRINE HYDROCHLORIDE 10 MG: 5 TABLET ORAL at 13:16

## 2021-04-12 RX ADMIN — GUAIFENESIN 600 MG: 600 TABLET, EXTENDED RELEASE ORAL at 04:25

## 2021-04-12 RX ADMIN — GABAPENTIN 600 MG: 300 CAPSULE ORAL at 20:27

## 2021-04-12 RX ADMIN — GABAPENTIN 600 MG: 300 CAPSULE ORAL at 10:17

## 2021-04-12 RX ADMIN — ENOXAPARIN SODIUM 30 MG: 30 INJECTION SUBCUTANEOUS at 04:24

## 2021-04-12 RX ADMIN — BACLOFEN 10 MG: 10 TABLET ORAL at 13:17

## 2021-04-12 RX ADMIN — Medication 5000 UNITS: at 04:24

## 2021-04-12 RX ADMIN — MIDODRINE HYDROCHLORIDE 10 MG: 5 TABLET ORAL at 04:25

## 2021-04-12 RX ADMIN — OXYCODONE 5 MG: 5 TABLET ORAL at 10:17

## 2021-04-12 RX ADMIN — BISACODYL 10 MG: 10 SUPPOSITORY RECTAL at 20:26

## 2021-04-12 RX ADMIN — CALCIUM CARBONATE 500 MG: 500 TABLET, CHEWABLE ORAL at 04:24

## 2021-04-12 RX ADMIN — MINERAL OIL, PETROLATUM 1 APPLICATION: 425; 573 OINTMENT OPHTHALMIC at 04:37

## 2021-04-12 RX ADMIN — ENOXAPARIN SODIUM 30 MG: 30 INJECTION SUBCUTANEOUS at 17:27

## 2021-04-12 RX ADMIN — GABAPENTIN 600 MG: 300 CAPSULE ORAL at 13:16

## 2021-04-12 RX ADMIN — DOCUSATE SODIUM 100 MG: 100 CAPSULE, LIQUID FILLED ORAL at 20:26

## 2021-04-12 RX ADMIN — MIDODRINE HYDROCHLORIDE 10 MG: 5 TABLET ORAL at 20:27

## 2021-04-12 RX ADMIN — BACLOFEN 10 MG: 10 TABLET ORAL at 17:26

## 2021-04-12 RX ADMIN — GABAPENTIN 600 MG: 300 CAPSULE ORAL at 17:26

## 2021-04-12 RX ADMIN — BACLOFEN 10 MG: 10 TABLET ORAL at 04:25

## 2021-04-12 RX ADMIN — CALCIUM CARBONATE 500 MG: 500 TABLET, CHEWABLE ORAL at 17:26

## 2021-04-12 ASSESSMENT — COGNITIVE AND FUNCTIONAL STATUS - GENERAL
MOVING TO AND FROM BED TO CHAIR: UNABLE
STANDING UP FROM CHAIR USING ARMS: TOTAL
DRESSING REGULAR LOWER BODY CLOTHING: TOTAL
EATING MEALS: A LOT
PERSONAL GROOMING: A LOT
CLIMB 3 TO 5 STEPS WITH RAILING: TOTAL
MOVING FROM LYING ON BACK TO SITTING ON SIDE OF FLAT BED: UNABLE
DRESSING REGULAR UPPER BODY CLOTHING: TOTAL
MOBILITY SCORE: 6
DAILY ACTIVITIY SCORE: 8
TOILETING: TOTAL
HELP NEEDED FOR BATHING: TOTAL
TURNING FROM BACK TO SIDE WHILE IN FLAT BAD: UNABLE
WALKING IN HOSPITAL ROOM: TOTAL
SUGGESTED CMS G CODE MODIFIER MOBILITY: CN
SUGGESTED CMS G CODE MODIFIER DAILY ACTIVITY: CM

## 2021-04-12 ASSESSMENT — GAIT ASSESSMENTS: GAIT LEVEL OF ASSIST: UNABLE TO PARTICIPATE

## 2021-04-12 ASSESSMENT — ENCOUNTER SYMPTOMS
MYALGIAS: 1
ROS GI COMMENTS: BM 4/10
WEAKNESS: 1
SENSORY CHANGE: 1
RESPIRATORY NEGATIVE: 1
PSYCHIATRIC NEGATIVE: 1

## 2021-04-12 ASSESSMENT — PAIN DESCRIPTION - PAIN TYPE
TYPE: ACUTE PAIN
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
## 2021-04-12 NOTE — THERAPY
Physical Therapy   Daily Treatment     Patient Name: Enrique Blackmon  Age:  25 y.o., Sex:  male  Medical Record #: [id number]  Today's Date: 4/12/2021     Precautions: (P) Fall Risk, Cervical Collar  , Spinal / Back Precautions     Assessment    Pt pre-medicated before PT session. Improvement w/pts ability to hold long sitting w/UE underneath LE's and in tripod position, able to hold for 1-2 seconds. EOB w/UE support, pt conts to require min support. Pt conts to gain strength w/bilat UE's, still no tenodesis or triceps. Pt conts to require use of ace wraps and abdominal binder, will work on weaning if BP allows.   Recommend acute SCI rehab to assist pt toward functional indep @ his level of injury.     Plan    Continue current treatment plan.    DC Equipment Recommendations: Unable to determine at this time  Discharge Recommendations: Recommend post-acute placement for additional physical therapy services prior to discharge home     Objective       04/12/21 1157   Other Treatments   Other Treatments Provided AAROM PNF D1 and D2 w/pt supine. Bilat LE PROM w/HC stretch while donning pj bottoms. Rolling to pull pj bottoms over hips. EOB x 8 mins w/bilat UE support w/min assist. Pt still unable to hold wo/support. Long sitting w/UE's in tripod position, holding for 2-3 seconds. Max assist to pull pt out into long sitting w/HOB elevated, once hands placed under knees, pt could hold midline for 2-3 seconds.    Balance   Sitting Balance (Static) Trace   Sitting Balance (Dynamic) Dependent   Weight Shift Sitting Absent   Gait Analysis   Gait Level Of Assist Unable to Participate   Bed Mobility    Supine to Sit Total Assist  (from sidleying position, HOB slightly elevated. )   Sit to Supine   (pt left seated in cardiac chair)   Scooting Total Assist  (seated)   Rolling Maximal Assist to Rt.;Maximum Assist to Lt.   Skilled Intervention Verbal Cuing;Postural Facilitation;Compensatory Strategies   Comments Sup->sidelying and assisted  onto Rt elbow. Total assist to finish the task.    Functional Mobility   Sit to Stand Unable to Participate   Bed, Chair, Wheelchair Transfer Maximal Assist  (bed->cardiac chair, seated on ROHO cushion)   Transfer Method Slide Board  (seated)   Skilled Intervention Verbal Cuing;Postural Facilitation;Compensatory Strategies   Comments Pt transferred to his Lft side, cueing to push w/Rt UE. Pt assisting more w/the transfer. Will initiate transfer to the w/c tomorrow.    How much difficulty does the patient currently have...   Turning over in bed (including adjusting bedclothes, sheets and blankets)? 1   Sitting down on and standing up from a chair with arms (e.g., wheelchair, bedside commode, etc.) 1   Moving from lying on back to sitting on the side of the bed? 1   How much help from another person does the patient currently need...   Moving to and from a bed to a chair (including a wheelchair)? 1   Need to walk in a hospital room? 1   Climbing 3-5 steps with a railing? 1   6 clicks Mobility Score 6   Short Term Goals    Short Term Goal # 1 pt will roll R/L with mod A in 6 visits to work towards improved sup > sit   Goal Outcome # 1 goal not met   Short Term Goal # 2 pt will move supine <> sit with mod A in 6 visits for improved independence   Goal Outcome # 2 Goal not met   Short Term Goal # 3 pt will sit EOB 5 min with fair - balance for improved independence   Goal Outcome # 3 Goal not met   Short Term Goal # 4 pt will perform seated SB xfr to WC with mod A in 6 visits for improved OOB mobility   Goal Outcome # 4 Goal not met

## 2021-04-12 NOTE — CONSULTS
"BRIEF PSYCHIATRIC CONSULT NOTE: patient seen and assessed, Affect was euthymic. Mood was stated as \"good.\" He demonstrated excellent insight into his psychological health including his psychological needs. His primary value is \"independence.\" Would like to focus on strengthening his coping skills, in particular mindfulness and interpersonal communication. Agreed to meet on a regular basis while he is in the acute care setting.    Full note to follow.    -Legal Hold:not indicated  -Trauma and Stressor Related Disorder; Bipolar Disorder (from hx); Reactive Attachment Disorder (from hx.)  -will follow              "
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
## 2021-04-12 NOTE — DISCHARGE PLANNING
4/13/21  1620  Lsw received the Covid 19 results and faxed to [facility] at [phone]. Antwan Mcbride was informed that the flight arrangements will be taken care of in the am.   Lsw informed both Parul (BCAMAURY) and Jana (Chanell) that the document was sent to [facility]. They were informed that the flight arrangements will be made in the morning.     Lsw informed the mother and floor nurse of the updates.    ADDENDUM  4/13/21 at 08:36  Correction:  [facility] phone number ([phone]) ask for admission nurse.  Fax number is [phone])    Lsw faxed updated OT notes per request.    Lsw received a v/m from Jana (Chanell) requesting recent notes, will refer to UR.        Care Transition Team Discharge Planning    Anticipated Discharge Disposition: DC pending    Action: Lsw faxed admission paperwork to [facility] ([phone] ) per Parul's request.    Lsw contacted [facility] and spoke with Ramona who reported that they would accept the patient pending insurance authorization. They also requested updated therapy notes. Lsw contacted therapy who reported that the notes will be available later today.     Lsw contacted mother (Sariah) and gave her an update. She reported that [facility] is her first option, but if they will continue to refuse, then [facility] is the 2nd choice.    The mother also reported that [facility] declined placement for the 2nd time. There will be a peer to peer meeting among the doctors with [facility]ell Bates.    Barriers to Discharge: Awaiting placement acceptance.    Plan: Lsw will assist medical team with DC plan.
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
## 2021-04-12 NOTE — CARE PLAN
Problem: Safety  Goal: Will remain free from injury  Outcome: PROGRESSING AS EXPECTED  Note: Bed alarm is on, soft touch call light within reach, hourly rounding in place.     Problem: Venous Thromboembolism (VTW)/Deep Vein Thrombosis (DVT) Prevention:  Goal: Patient will participate in Venous Thrombosis (VTE)/Deep Vein Thrombosis (DVT)Prevention Measures  Outcome: PROGRESSING AS EXPECTED  Note: Pt receiving Lovenox injections, SCDs on.

## 2021-04-12 NOTE — DISCHARGE PLANNING
Care Transition Team Discharge Planning    Anticipated Discharge Disposition:  TBD    Action: Lsw spoke with Parul who confirmed the authorization number for ground ambulance once a provider has accepted.  Lsw re faxed admission referral to Jaquelin with [facility].  Lsw F/U with Jaquelin who reported that they did receive the packet.  Lsw met with supervisor to discuss the case.  Lsw spoke with the parent and gave them an update of the patient's discharge planning - awaiting rehab acceptance.    Barriers to Discharge: Awaiting placement acceptance.    Plan: Lsw will assist medical team with DC planning.

## 2021-04-13 ENCOUNTER — APPOINTMENT (OUTPATIENT)
Dept: RADIOLOGY | Facility: MEDICAL CENTER | Age: 26
DRG: 028 | End: 2021-04-13
Attending: NURSE PRACTITIONER
Payer: COMMERCIAL

## 2021-04-13 LAB
SARS-COV+SARS-COV-2 AG RESP QL IA.RAPID: NOTDETECTED
SARS-COV-2 RNA RESP QL NAA+PROBE: NOTDETECTED
SPECIMEN SOURCE: NORMAL
SPECIMEN SOURCE: NORMAL

## 2021-04-13 PROCEDURE — 97112 NEUROMUSCULAR REEDUCATION: CPT | Mod: CQ

## 2021-04-13 PROCEDURE — 700102 HCHG RX REV CODE 250 W/ 637 OVERRIDE(OP): Performed by: SURGERY

## 2021-04-13 PROCEDURE — 97763 ORTHC/PROSTC MGMT SBSQ ENC: CPT

## 2021-04-13 PROCEDURE — 700111 HCHG RX REV CODE 636 W/ 250 OVERRIDE (IP): Performed by: SURGERY

## 2021-04-13 PROCEDURE — A9270 NON-COVERED ITEM OR SERVICE: HCPCS | Performed by: SURGERY

## 2021-04-13 PROCEDURE — A9270 NON-COVERED ITEM OR SERVICE: HCPCS | Performed by: PHYSICAL MEDICINE & REHABILITATION

## 2021-04-13 PROCEDURE — 700102 HCHG RX REV CODE 250 W/ 637 OVERRIDE(OP): Performed by: PHYSICAL MEDICINE & REHABILITATION

## 2021-04-13 PROCEDURE — 97530 THERAPEUTIC ACTIVITIES: CPT | Mod: CQ

## 2021-04-13 PROCEDURE — U0003 INFECTIOUS AGENT DETECTION BY NUCLEIC ACID (DNA OR RNA); SEVERE ACUTE RESPIRATORY SYNDROME CORONAVIRUS 2 (SARS-COV-2) (CORONAVIRUS DISEASE [COVID-19]), AMPLIFIED PROBE TECHNIQUE, MAKING USE OF HIGH THROUGHPUT TECHNOLOGIES AS DESCRIBED BY CMS-2020-01-R: HCPCS

## 2021-04-13 PROCEDURE — 97535 SELF CARE MNGMENT TRAINING: CPT

## 2021-04-13 PROCEDURE — 87426 SARSCOV CORONAVIRUS AG IA: CPT

## 2021-04-13 PROCEDURE — 71045 X-RAY EXAM CHEST 1 VIEW: CPT

## 2021-04-13 PROCEDURE — 770001 HCHG ROOM/CARE - MED/SURG/GYN PRIV*

## 2021-04-13 PROCEDURE — 700102 HCHG RX REV CODE 250 W/ 637 OVERRIDE(OP): Performed by: PHYSICIAN ASSISTANT

## 2021-04-13 PROCEDURE — U0005 INFEC AGEN DETEC AMPLI PROBE: HCPCS

## 2021-04-13 PROCEDURE — A9270 NON-COVERED ITEM OR SERVICE: HCPCS | Performed by: PHYSICIAN ASSISTANT

## 2021-04-13 PROCEDURE — 99233 SBSQ HOSP IP/OBS HIGH 50: CPT | Performed by: PHYSICAL MEDICINE & REHABILITATION

## 2021-04-13 RX ORDER — ONDANSETRON 4 MG/1
4 TABLET, ORALLY DISINTEGRATING ORAL EVERY 4 HOURS PRN
Qty: 10 TABLET | Refills: 0 | Status: SHIPPED
Start: 2021-04-13

## 2021-04-13 RX ORDER — METHOCARBAMOL 750 MG/1
750 TABLET, FILM COATED ORAL EVERY 8 HOURS PRN
Qty: 120 TABLET | Status: SHIPPED
Start: 2021-04-13

## 2021-04-13 RX ORDER — BACLOFEN 10 MG/1
10 TABLET ORAL 3 TIMES DAILY
Qty: 90 TABLET | Status: SHIPPED
Start: 2021-04-13

## 2021-04-13 RX ORDER — LIDOCAINE 50 MG/G
1 PATCH TOPICAL EVERY 24 HOURS
Qty: 10 PATCH | Status: SHIPPED
Start: 2021-04-13

## 2021-04-13 RX ORDER — GABAPENTIN 400 MG/1
800 CAPSULE ORAL 4 TIMES DAILY
Qty: 90 CAPSULE | Status: SHIPPED
Start: 2021-04-13

## 2021-04-13 RX ORDER — MIDODRINE HYDROCHLORIDE 10 MG/1
10 TABLET ORAL EVERY 8 HOURS
Qty: 60 TABLET | Status: SHIPPED
Start: 2021-04-13

## 2021-04-13 RX ORDER — GUAIFENESIN 600 MG/1
600 TABLET, EXTENDED RELEASE ORAL EVERY 12 HOURS
Qty: 28 TABLET | Status: SHIPPED
Start: 2021-04-13

## 2021-04-13 RX ORDER — ACETAMINOPHEN 500 MG
1000 TABLET ORAL EVERY 6 HOURS PRN
Qty: 30 TABLET | Refills: 0 | Status: SHIPPED
Start: 2021-04-13

## 2021-04-13 RX ORDER — CALCIUM CARBONATE 500 MG/1
500 TABLET, CHEWABLE ORAL 2 TIMES DAILY
Qty: 30 TABLET | Status: SHIPPED
Start: 2021-04-13

## 2021-04-13 RX ORDER — GABAPENTIN 400 MG/1
800 CAPSULE ORAL 4 TIMES DAILY
Status: DISCONTINUED | OUTPATIENT
Start: 2021-04-13 | End: 2021-04-14 | Stop reason: HOSPADM

## 2021-04-13 RX ORDER — OXYCODONE HYDROCHLORIDE 5 MG/1
5 TABLET ORAL
Qty: 30 TABLET | Refills: 0 | Status: SHIPPED
Start: 2021-04-13 | End: 2021-04-20

## 2021-04-13 RX ADMIN — GABAPENTIN 800 MG: 400 CAPSULE ORAL at 17:33

## 2021-04-13 RX ADMIN — GABAPENTIN 800 MG: 400 CAPSULE ORAL at 22:09

## 2021-04-13 RX ADMIN — OXYCODONE 5 MG: 5 TABLET ORAL at 10:13

## 2021-04-13 RX ADMIN — MIDODRINE HYDROCHLORIDE 10 MG: 5 TABLET ORAL at 22:09

## 2021-04-13 RX ADMIN — METHOCARBAMOL 750 MG: 750 TABLET ORAL at 05:45

## 2021-04-13 RX ADMIN — GUAIFENESIN 600 MG: 600 TABLET, EXTENDED RELEASE ORAL at 05:44

## 2021-04-13 RX ADMIN — BACLOFEN 10 MG: 10 TABLET ORAL at 17:33

## 2021-04-13 RX ADMIN — DOCUSATE SODIUM 100 MG: 100 CAPSULE, LIQUID FILLED ORAL at 05:44

## 2021-04-13 RX ADMIN — GUAIFENESIN 600 MG: 600 TABLET, EXTENDED RELEASE ORAL at 17:32

## 2021-04-13 RX ADMIN — ENOXAPARIN SODIUM 30 MG: 30 INJECTION SUBCUTANEOUS at 17:33

## 2021-04-13 RX ADMIN — MIDODRINE HYDROCHLORIDE 10 MG: 5 TABLET ORAL at 13:40

## 2021-04-13 RX ADMIN — CALCIUM CARBONATE 500 MG: 500 TABLET, CHEWABLE ORAL at 17:33

## 2021-04-13 RX ADMIN — Medication 5000 UNITS: at 05:45

## 2021-04-13 RX ADMIN — CALCIUM CARBONATE 500 MG: 500 TABLET, CHEWABLE ORAL at 05:44

## 2021-04-13 RX ADMIN — BACLOFEN 10 MG: 10 TABLET ORAL at 05:44

## 2021-04-13 RX ADMIN — GABAPENTIN 800 MG: 400 CAPSULE ORAL at 13:40

## 2021-04-13 RX ADMIN — BACLOFEN 10 MG: 10 TABLET ORAL at 13:40

## 2021-04-13 RX ADMIN — GABAPENTIN 600 MG: 300 CAPSULE ORAL at 09:26

## 2021-04-13 RX ADMIN — ENOXAPARIN SODIUM 30 MG: 30 INJECTION SUBCUTANEOUS at 05:44

## 2021-04-13 RX ADMIN — MIDODRINE HYDROCHLORIDE 10 MG: 5 TABLET ORAL at 05:44

## 2021-04-13 ASSESSMENT — COGNITIVE AND FUNCTIONAL STATUS - GENERAL
DRESSING REGULAR UPPER BODY CLOTHING: TOTAL
SUGGESTED CMS G CODE MODIFIER MOBILITY: CN
TURNING FROM BACK TO SIDE WHILE IN FLAT BAD: UNABLE
MOVING TO AND FROM BED TO CHAIR: UNABLE
MOBILITY SCORE: 6
PERSONAL GROOMING: A LOT
WALKING IN HOSPITAL ROOM: TOTAL
TOILETING: TOTAL
MOVING FROM LYING ON BACK TO SITTING ON SIDE OF FLAT BED: UNABLE
EATING MEALS: A LOT
DRESSING REGULAR LOWER BODY CLOTHING: TOTAL
DAILY ACTIVITIY SCORE: 8
STANDING UP FROM CHAIR USING ARMS: TOTAL
CLIMB 3 TO 5 STEPS WITH RAILING: TOTAL
SUGGESTED CMS G CODE MODIFIER DAILY ACTIVITY: CM
HELP NEEDED FOR BATHING: TOTAL

## 2021-04-13 ASSESSMENT — ENCOUNTER SYMPTOMS
ROS GI COMMENTS: BM 4/11
SENSORY CHANGE: 1
RESPIRATORY NEGATIVE: 1
PSYCHIATRIC NEGATIVE: 1
MYALGIAS: 1
WEAKNESS: 1

## 2021-04-13 ASSESSMENT — PAIN DESCRIPTION - PAIN TYPE
TYPE: ACUTE PAIN
TYPE: ACUTE PAIN

## 2021-04-13 ASSESSMENT — GAIT ASSESSMENTS: GAIT LEVEL OF ASSIST: UNABLE TO PARTICIPATE

## 2021-04-13 NOTE — THERAPY
Occupational Therapy  Daily Treatment     Patient Name: Enrique Blackmon  Age:  25 y.o., Sex:  male  Medical Record #: [id number]  Today's Date: 4/12/2021     Precautions  Precautions: Fall Risk, Cervical Collar  , Spinal / Back Precautions   Comments: C5 ASA B, collar on AAT    Assessment    Pt seen for session focused on use of universal cuff and dual handled cup for feeding and grooming. Pt able to simulate bringing R hand to mouth for feeding and oral care, however fatigues quickly and demonstrates impaired gross motor accuracy. Pt able to use LUE for gross assist holding dual handled cup. Pt educated on HEP and splint wear schedule.     Plan    Continue current treatment plan with updated treatment goals.    DC Equipment Recommendations: Unable to determine at this time  Discharge Recommendations: Recommend post-acute placement for additional occupational therapy services prior to discharge home    Subjective    Pt alert, pleasant, and cooperative during OT tx session.      Objective       04/12/21 1708   Cognition    Cognition / Consciousness WDL   Level of Consciousness Alert   Comments pleasant, cooperative, very motivated   Active ROM Upper Body   Active ROM Upper Body  X   Dominant Hand Right   Comments AROM bilateral traps WFL, biceps 3+/5    Strength Upper Body   Rt Elbow Flexion Strength 3+ (F+)   Sensation Upper Body   Upper Extremity Sensation  X   Comments bilateral numbness wrist to fingertips   Other Treatments   Other Treatments Provided Simulated practice using universal cuff for feeding and brushing teeth. Practice using two-handled cup for drinking. Discussed brace comfort, wear schedule.    Activities of Daily Living   Eating Maximal Assist  (eating pudding with ucuff, modA using sip cup)   Grooming Total Assist   Activity Tolerance   Comments fatigues with UE functional tasks, requires breaks   Patient / Family Goals   Patient / Family Goal #1 to return home   Short Term Goals   Short Term Goal # 1  min A to self feed with Ucuff    Goal Outcome # 1 Progressing as expected   Short Term Goal # 2 min A with UB dressing   Goal Outcome # 2 Goal not met   Short Term Goal # 3 Patient will independently verbalize splint wear schedule with no verbal cues by discharge.   Goal Outcome # 3 Progressing as expected   Short Term Goal # 4 Patient will complete oral care with min assist and use of AE seated upright in bed by discharge.   Goal Outcome # 4 Progressing as expected   Short Term Goal # 5 Patient will identify 3 strategies for skin protection/pressure relief by discharge.   Goal Outcome # 5 Progressing as expected
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
## 2021-04-13 NOTE — CARE PLAN
Problem: Venous Thromboembolism (VTW)/Deep Vein Thrombosis (DVT) Prevention:  Goal: Patient will participate in Venous Thrombosis (VTE)/Deep Vein Thrombosis (DVT)Prevention Measures  Outcome: PROGRESSING AS EXPECTED  Intervention: Assess and monitor for anticoagulation complications  Note: SCD and lovenox in use for dvt prophylaxis, no s/s of complications     Problem: Pain Management  Goal: Pain level will decrease to patient's comfort goal  Outcome: PROGRESSING AS EXPECTED  Intervention: Follow pain managment plan developed in collaboration with patient and Interdisciplinary Team  Note: Pain med given prior to his therapy with PT

## 2021-04-13 NOTE — THERAPY
Occupational Therapy  Daily Treatment     Patient Name: Enrique Blackmon  Age:  25 y.o., Sex:  male  Medical Record #: [id number]  Today's Date: 4/13/2021     Precautions  Precautions: Fall Risk, Cervical Collar  , Spinal / Back Precautions   Comments: C5 ASA B, collar on AAT    Assessment    Pt provided with bilateral prefabricated resting hand splints for night time use. Pt educated on purpose of splint, splint schedule, and importance of routine skin checks. Pt also provided with handled cup with straw, with dycem placed over handle, sides, and bottom for greater ease/use. Pt able to brush teeth with maxA using universal cuff and use handled cup with straw to feed self with modA.     Plan    Continue current treatment plan.    DC Equipment Recommendations: Unable to determine at this time  Discharge Recommendations: Recommend post-acute placement for additional occupational therapy services prior to discharge home    Subjective    Pt pleasant, motivated, and cooperative, but did report feeling fatigued today and sensitivity to touch in bilateral UE. No c/o pain.      Objective       04/13/21 1458   Cognition    Cognition / Consciousness WDL   Level of Consciousness Alert   Comments pleasant, cooperative, motivated   Sensation Upper Body   Upper Extremity Sensation  X   Comments bilateral numbness wrist to fingertips   Other Treatments   Other Treatments Provided Pt provided with bilateral prefabricated resting hand splints for night time use. Pt educated on purpose of splint, splint schedule, and importance of routine skin checks. Pt also provided with handled cup with straw, with dycem placed over handle, sides, and bottom for greater ease/use.    Activities of Daily Living   Eating Moderate Assist  (with use of handled cup/straw)   Grooming Maximal Assist  (brushing teeth with u-cuff)   Activity Tolerance   Comments appeared more fatigued this session than previous sessions, requires rest breaks in between activity    Patient / Family Goals   Patient / Family Goal #1 to return home   Short Term Goals   Short Term Goal # 1 min A to self feed with Ucuff    Goal Outcome # 1 Progressing as expected   Short Term Goal # 2 min A with UB dressing   Goal Outcome # 2 Goal not met   Short Term Goal # 3 Patient will independently verbalize splint wear schedule with no verbal cues by discharge.   Goal Outcome # 3 Progressing as expected   Short Term Goal # 4 Patient will complete oral care with min assist and use of AE seated upright in bed by discharge.   Goal Outcome # 4 Progressing as expected   Short Term Goal # 5 Patient will identify 3 strategies for skin protection/pressure relief by discharge.   Goal Outcome # 5 Progressing as expected
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
## 2021-04-13 NOTE — THERAPY
Physical Therapy   Daily Treatment     Patient Name: Enrique Blackmon  Age:  25 y.o., Sex:  male  Medical Record #: [id number]  Today's Date: 4/13/2021     Precautions: Fall Risk, Cervical Collar  , Spinal / Back Precautions     Assessment    Pt smiling upon entry into the room. Possible d/c to rehab tomorrow a.m, via [facility]. Cont to work on long sitting w/UE in A position, able to hold for 3-4 seconds. Pt very motivated and willing to try new tasks. Pt's Rt UE remains stronger than his Lft, becoming more confident w/long sitting propping.   Pt is an excellent rehab candidate.     Plan    Continue current treatment plan.    DC Equipment Recommendations: Unable to determine at this time  Discharge Recommendations: Recommend post-acute placement for additional physical therapy services prior to discharge home     Objective       04/13/21 1143   Other Treatments   Other Treatments Provided Donned pj bottoms, bilat LE ace wrapped, and belly binder donned. Mat activities today: Working on long sitting w/A position and mirror for visual, fall recovery by throwing UE's behind w/max assist to maintain midline, out over LE's w/UE's under knees pulling forward, and attempted elbow propping. Long sitting w/UE in A position and out over LE's pt could hold for 3-4 seconds.    Balance   Sitting Balance (Static) Trace   Sitting Balance (Dynamic) Dependent   Weight Shift Sitting Absent   Gait Analysis   Gait Level Of Assist Unable to Participate   Bed Mobility    Supine to Sit Total Assist   Sit to Supine Total Assist   Scooting Total Assist   Rolling Maximum Assist to Lt.;Maximal Assist to Rt.   Skilled Intervention Verbal Cuing;Postural Facilitation;Compensatory Strategies   Comments Initiated rolling to the Lft from sidelying position. Instructed pt on throwing UE to initiate the roll. Pt still requiring assistance.    Functional Mobility   Sit to Stand Unable to Participate   Bed, Chair, Wheelchair Transfer Maximal  Assist  (bed->w/c<->mat surface)   Transfer Method Slide Board  (seated)   Skilled Intervention Verbal Cuing;Postural Facilitation;Compensatory Strategies   Comments Initiated seated SB transfers to the w/c today, seated on ROHO cushion. Pt assisting w/Rt UE, pushing w/transfer to the Lft. Ongoing education on hip/head relationship.    How much difficulty does the patient currently have...   Turning over in bed (including adjusting bedclothes, sheets and blankets)? 1   Sitting down on and standing up from a chair with arms (e.g., wheelchair, bedside commode, etc.) 1   Moving from lying on back to sitting on the side of the bed? 1   How much help from another person does the patient currently need...   Moving to and from a bed to a chair (including a wheelchair)? 1   Need to walk in a hospital room? 1   Climbing 3-5 steps with a railing? 1   6 clicks Mobility Score 6   Short Term Goals    Short Term Goal # 1 pt will roll R/L with mod A in 6 visits to work towards improved sup > sit   Goal Outcome # 1 goal not met   Short Term Goal # 2 pt will move supine <> sit with mod A in 6 visits for improved independence   Goal Outcome # 2 Goal not met   Short Term Goal # 3 pt will sit EOB 5 min with fair - balance for improved independence   Goal Outcome # 3 Goal not met   Short Term Goal # 4 pt will perform seated SB xfr to WC with mod A in 6 visits for improved OOB mobility   Goal Outcome # 4 Goal not met
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
## 2021-04-14 VITALS
RESPIRATION RATE: 17 BRPM | TEMPERATURE: 98.4 F | OXYGEN SATURATION: 100 % | HEART RATE: 65 BPM | SYSTOLIC BLOOD PRESSURE: 114 MMHG | WEIGHT: 139.99 LBS | HEIGHT: 66 IN | DIASTOLIC BLOOD PRESSURE: 59 MMHG | BODY MASS INDEX: 22.5 KG/M2

## 2021-04-14 PROCEDURE — A9270 NON-COVERED ITEM OR SERVICE: HCPCS | Performed by: PHYSICIAN ASSISTANT

## 2021-04-14 PROCEDURE — 700102 HCHG RX REV CODE 250 W/ 637 OVERRIDE(OP): Performed by: SURGERY

## 2021-04-14 PROCEDURE — A9270 NON-COVERED ITEM OR SERVICE: HCPCS | Performed by: PHYSICAL MEDICINE & REHABILITATION

## 2021-04-14 PROCEDURE — A9270 NON-COVERED ITEM OR SERVICE: HCPCS | Performed by: SURGERY

## 2021-04-14 PROCEDURE — 700111 HCHG RX REV CODE 636 W/ 250 OVERRIDE (IP): Performed by: SURGERY

## 2021-04-14 PROCEDURE — 99232 SBSQ HOSP IP/OBS MODERATE 35: CPT | Performed by: PHYSICAL MEDICINE & REHABILITATION

## 2021-04-14 PROCEDURE — 700102 HCHG RX REV CODE 250 W/ 637 OVERRIDE(OP): Performed by: PHYSICIAN ASSISTANT

## 2021-04-14 PROCEDURE — 700102 HCHG RX REV CODE 250 W/ 637 OVERRIDE(OP): Performed by: PHYSICAL MEDICINE & REHABILITATION

## 2021-04-14 RX ADMIN — OXYCODONE 5 MG: 5 TABLET ORAL at 13:45

## 2021-04-14 RX ADMIN — CALCIUM CARBONATE 500 MG: 500 TABLET, CHEWABLE ORAL at 04:50

## 2021-04-14 RX ADMIN — BACLOFEN 10 MG: 10 TABLET ORAL at 04:51

## 2021-04-14 RX ADMIN — ENOXAPARIN SODIUM 30 MG: 30 INJECTION SUBCUTANEOUS at 05:04

## 2021-04-14 RX ADMIN — MIDODRINE HYDROCHLORIDE 10 MG: 5 TABLET ORAL at 12:53

## 2021-04-14 RX ADMIN — GABAPENTIN 800 MG: 400 CAPSULE ORAL at 08:21

## 2021-04-14 RX ADMIN — Medication 5000 UNITS: at 04:50

## 2021-04-14 RX ADMIN — GABAPENTIN 800 MG: 400 CAPSULE ORAL at 12:53

## 2021-04-14 RX ADMIN — MIDODRINE HYDROCHLORIDE 10 MG: 5 TABLET ORAL at 04:50

## 2021-04-14 RX ADMIN — GUAIFENESIN 600 MG: 600 TABLET, EXTENDED RELEASE ORAL at 04:50

## 2021-04-14 RX ADMIN — BACLOFEN 10 MG: 10 TABLET ORAL at 12:53

## 2021-04-14 ASSESSMENT — PAIN DESCRIPTION - PAIN TYPE: TYPE: ACUTE PAIN

## 2021-04-14 NOTE — PROGRESS NOTES
Pt's mother would like update from SW regarding d/c plan. Notified Komal. She acknowledged. Will cont to monitor.

## 2021-04-14 NOTE — PROGRESS NOTES
Pt discharged to [facility].  Educational handouts for SCI given and discussed. Verbalized understanding of discharge instructions and educational handouts. All questions answered. Belongings and CD images with flight RNs at time of discharge. Pt acknowledged. Paperwork place in pt's chart.

## 2021-04-14 NOTE — PROGRESS NOTES
Significant events overnight: Wore wrist supports from about [id number], then refused. Changed boots q2 hours.     Neuro status: AOx4    Cardiac: no tele monitor    Pain: denies    Bladder/Bowel: lewis for neurogenic bladder; no BM overnight    Diet: regular w/ thins, pills whole    Mobility: q2 turns

## 2021-04-14 NOTE — PROGRESS NOTES
Attempted to give report to RN of [facility]. Rn is on lunch. Will call back in 10-15 min per requested.

## 2021-04-14 NOTE — DISCHARGE INSTRUCTIONS
Spinal Cord Injury    The spinal cord is a long bundle of nerve cells and fibers along the spine. It carries messages between the brain and the rest of the body. A spinal cord injury can block the path of messages traveling through the spinal cord between your brain and the rest of your body. As a result, it can cause you to lose feeling, movement, and function in parts of the body below the injury.  There are two types of spinal cord injuries:  · Incomplete. This type causes some loss of feeling, movement, or function below the level of the injury.  · Complete. This type causes a total loss of feeling, movement, and function below the level of the injury.  What are the causes?  This condition may be caused by:  · A car or motorcycle accident.  · A fall.  · A sports injury, such as from:  ? A trampoline accident.  ? Diving into shallow water or water that contains debris or obstacles.  · A gunshot.  · A birth injury.  · A surgical injury.  · An infection that involves the spinal cord.  This condition is most often caused by an injury to the bones that make up the spine. Any movement of these bones can crush, tear, or put pressure on the spinal cord.  What increases the risk?  You are more likely to develop this condition if:  · You are male.  · You are between the ages of 16 and 30.  · You are over the age of 65.  · You do activities that are more likely to cause this type of injury such as:  ? High-speed sports.  ? Activities that make you more likely to fall on your head, neck, or back.  What are the signs or symptoms?  Symptoms of this condition depend on the location and severity of the injury. Symptoms may include:  · Partial or total loss of movement.  · Partial or total loss of feeling.  · Difficulty breathing.  · Loss of bladder or bowel control.  · Pain or pressure in the neck, back, or head.  · Tingling in your hands, fingers, feet, or toes.  · Lumps in the head or spine.  The spinal cord is divided into  sections that control different parts of your body. Where you experience symptoms depends on which segment of your spinal cord is damaged:  · If your neck (cervical) segment is damaged, you will have symptoms in your neck, arms, and fingers.  · If your upper back (thoracic) segment is damaged, you will have symptoms in your torso.  · If your lower back (lumbar) segment is damaged, you will have symptoms in your hips and legs.  · If your tailbone (sacral) segment is damaged, you will have symptoms in your groin and toes.  How is this diagnosed?  This condition is diagnosed based on:  · The injury you received.  · Your medical history.  · Your symptoms.  · A physical exam.  · Imaging tests, such as:  ? X-rays.  ? CT scans.  ? An MRI.  How is this treated?  This condition requires emergency treatment to stabilize the spine and to prevent further injury. You may be admitted to an intensive care unit (ICU), where health care providers will:  · Provide breathing support.  · Stabilize your blood pressure.  · Treat any infections with antibiotic medicines.  · Monitor your heart and lung function.  Additional treatment may involve:  · Wearing a rigid neck brace to keep your neck from moving.  · Being strapped to a board to prevent the rest of your spine from moving.  · Having a treatment to stretch your spine in order to relieve pressure on your spinal cord (traction).  · Having surgery to relieve pressure on the spine from a bone, blood clot, foreign body, or bulging disc.  · Taking medicines to reduce swelling.  · Taking medicines to reduce pain.  There is no cure for this condition, but long-term therapy and support from health care providers and caregivers can help with the effects of the injury. Therapy may include:  · Physical therapy. This includes exercises that help strengthen muscles, prevent stiffness, and maintain range of motion.  · Occupational therapy to help you safely manage your home and work  life.  · Electrical stimulation of nerves. This may help restore some body functions.  · Social and emotional support from friends and family members.  Therapists and support programs can help you learn to:  · Take care of yourself at home and in the community.  · Manage bowel and bladder problems.  · [place] with mental health problems.  · Manage pain.  Follow these instructions at home:  · Take over-the-counter and prescription medicines only as told by your health care provider.  · Do exercises as told by your health care provider.  · Work closely with all your health care providers.  · Make sure you have a good support system at home. Let someone know if you are struggling with anxiety or depression.  · Consider seeking out a support group for people with your type of injury.  · Keep all follow-up visits as directed by your health care provider. This is important.  Contact a health care provider if:  · You have chills or fever.  · Your symptoms gradually change or get worse.  · You develop sores on your feet, back, elbows, tailbone, or hips.  · You have trouble urinating or pain when urinating.  · You need more support at home.  Get help right away if:  · Your symptoms suddenly get worse.  · You have a cough.  · You have shortness of breath.  · You have pain or a dull ache above the level of where your spine was injured.  · You have chest pain or trouble breathing.  · You have swelling, redness, or pain in your legs.  · You do not feel safe at home.  Summary  · A spinal cord injury can block the path of messages traveling through the spinal cord between your brain and the rest of your body.  · A complete spinal cord injury causes a total loss of feeling, movement, and function below the level of the injury.  · An incomplete spinal cord injury causes some loss of feeling, movement, or function below the level of the injury.  · This condition requires emergency treatment. It may be treated in an intensive care unit  (ICU).  · There is no cure for this condition, but long-term therapy and support from health care providers and caregivers can help with the effects of the injury.  This information is not intended to replace advice given to you by your health care provider. Make sure you discuss any questions you have with your health care provider.  Document Released: 12/08/2003 Document Revised: 11/30/2018 Document Reviewed: 01/30/2018  [employer] Patient Education © 2020 [employer]  Discharge Instructions    Discharged to other by medical transportation with escort. Discharged via ambulance, hospital escort: Yes.  Special equipment needed: Not Applicable    Be sure to schedule a follow-up appointment with your primary care doctor or any specialists as instructed.     Discharge Plan:   Diet Plan: Discussed  Activity Level: Discussed  Confirmed Follow up Appointment: No (Comments)  Confirmed Symptoms Management: Discussed  Medication Reconciliation Updated: Yes  Influenza Vaccine Indication: Not indicated: Previously immunized this influenza season and > 8 years of age    I understand that a diet low in cholesterol, fat, and sodium is recommended for good health. Unless I have been given specific instructions below for another diet, I accept this instruction as my diet prescription.   Other diet: regular      Special Instructions: None    · Is patient discharged on Warfarin / Coumadin?   No     Depression / Suicide Risk    As you are discharged from this [facility] facility, it is important to learn how to keep safe from harming yourself.    Recognize the warning signs:  · Abrupt changes in personality, positive or negative- including increase in energy   · Giving away possessions  · Change in eating patterns- significant weight changes-  positive or negative  · Change in sleeping patterns- unable to sleep or sleeping all the time   · Unwillingness or inability to communicate  · Depression  · Unusual sadness, discouragement  and loneliness  · Talk of wanting to die  · Neglect of personal appearance   · Rebelliousness- reckless behavior  · Withdrawal from people/activities they love  · Confusion- inability to concentrate     If you or a loved one observes any of these behaviors or has concerns about self-harm, here's what you can do:  · Talk about it- your feelings and reasons for harming yourself  · Remove any means that you might use to hurt yourself (examples: pills, rope, extension cords, firearm)  · Get professional help from the community (Mental Health, Substance Abuse, psychological counseling)  · Do not be alone:Call your Safe Contact- someone whom you trust who will be there for you.  · Call your local CRISIS HOTLINE 151-8200 or 204-215-4919  · Call your local Children's Mobile Crisis Response Team [place] (018) 244-0112 or www.[employer]  · Call the toll free National Suicide Prevention Hotlines   · National Suicide Prevention Lifeline 429-498-YZUV (7865)  · National Hope Line Network 800-SUICIDE (154-2424)
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
## 2021-04-14 NOTE — DISCHARGE PLANNING
Received Transport Form @ 1205  Spoke to Rusty @ [employer]    Transport is scheduled for 4/14 @1400 going to [facility].    @1015  Agency/Facility Name: [employer]  Spoke To: Rusty  Outcome: Still waiting for insurance auth for transport, will call once they receive it.    Received Transport Form @ 0905  Spoke to Rusty @ [employer]    Transport is scheduled for 4/14 @Pending insurance auth going to [facility].

## 2021-04-14 NOTE — DISCHARGE PLANNING
Care Transition Team Discharge Planning    Anticipated Discharge Disposition: DC to [facility]      Action: Graciela spoke with Amada to discuss acceptance to [facility].   Care flight was arranged. Pt will DC via care flight at 1400. Plane will [place] at the [place]. [facility] should be 1 hour and 15 minutes.    Humphreyw picked up CD images and gave to bedside nurse to place in his DC paperwork.    Humphreyw informed the receiving nurses, patient's parents and both Parul (BSBC) and Jana (Opitum) of the DC time, length of trip and receiving airport.    Barriers to Discharge: None    Plan: DC to accepting rehab.
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
## 2023-05-03 NOTE — CARE PLAN
Problem: Communication  Goal: The ability to communicate needs accurately and effectively will improve  Outcome: PROGRESSING AS EXPECTED   Patient is able to communicate needs appropriately  Problem: Safety  Goal: Will remain free from falls  Outcome: PROGRESSING AS EXPECTED   Patient continues to remain free from falls   full liquids

## (undated) DEVICE — PROTECTOR ULNA NERVE - (36PR/CA)

## (undated) DEVICE — LIGHT SOURCE MIS 12FT

## (undated) DEVICE — SET LEADWIRE 5 LEAD BEDSIDE DISPOSABLE ECG (1SET OF 5/EA)

## (undated) DEVICE — ELECTRODE 850 FOAM ADHESIVE - HYDROGEL RADIOTRNSPRNT (50/PK)

## (undated) DEVICE — KIT SURGIFLO W/OUT THROMBIN - (6EA/CA)

## (undated) DEVICE — SURGIFOAM (12X7) - (12EA/CA)

## (undated) DEVICE — GLOVE BIOGEL INDICATOR SZ 7.5 SURGICAL PF LTX - (50PR/BX 4BX/CA)

## (undated) DEVICE — BLADE SURGICAL CLIPPER - (50EA/CA)

## (undated) DEVICE — TOWELS CLOTH SURGICAL - (4/PK 20PK/CA)

## (undated) DEVICE — SUTURE 2-0 VICRYL PLUS CT-1 - 8 X 18 INCH(12/BX)

## (undated) DEVICE — PACK NEURO - (2EA/CA)

## (undated) DEVICE — LACTATED RINGERS INJ. 500 ML - (24EA/CA)

## (undated) DEVICE — GLOVE BIOGEL SZ 8.5 SURGICAL PF LTX - (50PR/BX 4BX/CA)

## (undated) DEVICE — ARMREST CRADLE FOAM - (2PR/PK 12PR/CA)

## (undated) DEVICE — TOOL DISSECT MATCH HEAD

## (undated) DEVICE — SPONGE GAUZESTER 4 X 4 4PLY - (128PK/CA)

## (undated) DEVICE — SODIUM CHL IRRIGATION 0.9% 1000ML (12EA/CA)

## (undated) DEVICE — MIDAS LUBRICATOR DIFFUSER PACK (4EA/CA)

## (undated) DEVICE — CANISTER SUCTION 3000ML MECHANICAL FILTER AUTO SHUTOFF MEDI-VAC NONSTERILE LF DISP  (40EA/CA)

## (undated) DEVICE — SENSOR SPO2 NEO LNCS ADHESIVE (20/BX) SEE USER NOTES

## (undated) DEVICE — GOWN SURGEONS LARGE - (32/CA)

## (undated) DEVICE — SYRINGE NON SAFETY 10 CC 20 GA X 1-1/2 IN (100/BX 4BX/CA)

## (undated) DEVICE — TUBING C&T SET FLYING LEADS DRAIN TUBING (10EA/BX)

## (undated) DEVICE — DRAPE LAPAROTOMY T SHEET - (12EA/CA)

## (undated) DEVICE — SUTURE 2-0 VICRYL PLUS CT-2 - 27 INCH (36/BX)

## (undated) DEVICE — PAD BABY LAP 4X18 W/O - RINGS PREWASHED 5/PK 40PK/CS

## (undated) DEVICE — DRAPE MAYO STAND - (30/CA)

## (undated) DEVICE — SET EXTENSION WITH 2 PORTS (48EA/CA) ***PART #2C8610 IS A SUBSTITUTE*****

## (undated) DEVICE — DRAPE STRLE REG TOWEL 18X24 - (10/BX 4BX/CA)"

## (undated) DEVICE — PIN HEAD MAYFIELD DISP. (3EA/PK 12PK/BX)

## (undated) DEVICE — SUCTION INSTRUMENT YANKAUER BULBOUS TIP W/O VENT (50EA/CA)

## (undated) DEVICE — ELECTRODE DUAL RETURN W/ CORD - (50/PK)

## (undated) DEVICE — TUBING CLEARLINK DUO-VENT - C-FLO (48EA/CA)

## (undated) DEVICE — GOWN ULTRA SURGICAL LARGE (32/CA)

## (undated) DEVICE — SUTURE GENERAL

## (undated) DEVICE — NEPTUNE 4 PORT MANIFOLD - (20/PK)

## (undated) DEVICE — LACTATED RINGERS INJ 1000 ML - (14EA/CA 60CA/PF)

## (undated) DEVICE — DRESSING POST OP BORDER 4INX6IN AG (70/CA)

## (undated) DEVICE — CLOSURE SKIN STRIP 1/2 X 4 IN - (STERI STRIP) (50/BX 4BX/CA)

## (undated) DEVICE — SLEEVE, VASO, THIGH, MED

## (undated) DEVICE — HEADREST PRONEVIEW LARGE - (10/CA)

## (undated) DEVICE — KIT ANESTHESIA W/CIRCUIT & 3/LT BAG W/FILTER (20EA/CA)

## (undated) DEVICE — SHEET PEDIATRIC LAPAROTOMY - (10/CA)

## (undated) DEVICE — GLOVE BIOGEL SZ 7 SURGICAL PF LTX - (50PR/BX 4BX/CA)

## (undated) DEVICE — SUTURE 0 VICRYL PLUS CT-1 - 8 X 18 INCH (12/BX)

## (undated) DEVICE — CHLORAPREP 26 ML APPLICATOR - ORANGE TINT(25/CA)

## (undated) DEVICE — PATTIES SURG NEURO X-RAY 1/2X1/2 (10EA/PK 20PK/CS)

## (undated) DEVICE — MASK ANESTHESIA ADULT  - (100/CA)

## (undated) DEVICE — GOWN WARMING STANDARD FLEX - (30/CA)

## (undated) DEVICE — DRESSING TRANSPARENT FILM TEGADERM 4 X 4.75" (50EA/BX)"

## (undated) DEVICE — BIT 3.5MM DRILL VARIABLE STREAMLINE